# Patient Record
Sex: FEMALE | Race: WHITE | NOT HISPANIC OR LATINO | Employment: FULL TIME | URBAN - METROPOLITAN AREA
[De-identification: names, ages, dates, MRNs, and addresses within clinical notes are randomized per-mention and may not be internally consistent; named-entity substitution may affect disease eponyms.]

---

## 2022-01-31 PROBLEM — Z31.69 PRE-CONCEPTION COUNSELING: Status: ACTIVE | Noted: 2022-01-31

## 2022-02-01 PROBLEM — J45.909 ASTHMA: Status: ACTIVE | Noted: 2022-02-01

## 2022-03-25 PROBLEM — N89.8 VAGINAL DISCHARGE: Status: ACTIVE | Noted: 2022-03-25

## 2022-03-25 PROBLEM — L73.2 HIDRADENITIS SUPPURATIVA: Status: ACTIVE | Noted: 2022-03-25

## 2022-03-25 PROBLEM — B37.9 YEAST INFECTION: Status: ACTIVE | Noted: 2022-03-25

## 2022-05-12 PROBLEM — L73.2 HIDRADENITIS SUPPURATIVA: Status: RESOLVED | Noted: 2022-03-25 | Resolved: 2022-05-12

## 2022-10-14 ENCOUNTER — OFFICE VISIT (OUTPATIENT)
Dept: FAMILY MEDICINE CLINIC | Facility: CLINIC | Age: 32
End: 2022-10-14
Payer: COMMERCIAL

## 2022-10-14 VITALS
HEIGHT: 63 IN | OXYGEN SATURATION: 97 % | HEART RATE: 70 BPM | WEIGHT: 293 LBS | TEMPERATURE: 97.8 F | DIASTOLIC BLOOD PRESSURE: 84 MMHG | BODY MASS INDEX: 51.91 KG/M2 | SYSTOLIC BLOOD PRESSURE: 130 MMHG

## 2022-10-14 DIAGNOSIS — G89.29 CHRONIC MIDLINE LOW BACK PAIN WITHOUT SCIATICA: ICD-10-CM

## 2022-10-14 DIAGNOSIS — R10.84 GENERALIZED ABDOMINAL PAIN: Primary | ICD-10-CM

## 2022-10-14 DIAGNOSIS — M54.50 CHRONIC MIDLINE LOW BACK PAIN WITHOUT SCIATICA: ICD-10-CM

## 2022-10-14 DIAGNOSIS — K58.2 IRRITABLE BOWEL SYNDROME WITH BOTH CONSTIPATION AND DIARRHEA: ICD-10-CM

## 2022-10-14 PROCEDURE — 99214 OFFICE O/P EST MOD 30 MIN: CPT | Performed by: STUDENT IN AN ORGANIZED HEALTH CARE EDUCATION/TRAINING PROGRAM

## 2022-10-14 RX ORDER — OMEPRAZOLE 20 MG/1
20 CAPSULE, DELAYED RELEASE ORAL
Qty: 90 CAPSULE | Refills: 0 | Status: SHIPPED | OUTPATIENT
Start: 2022-10-14

## 2022-10-14 RX ORDER — METHYLPREDNISOLONE 4 MG/1
TABLET ORAL
Qty: 21 EACH | Refills: 0 | Status: SHIPPED | OUTPATIENT
Start: 2022-10-14

## 2022-10-14 NOTE — PROGRESS NOTES
Assessment/Plan:         Problem List Items Addressed This Visit    None     Visit Diagnoses     Generalized abdominal pain    -  Primary    Relevant Medications    omeprazole (PriLOSEC) 20 mg delayed release capsule    Irritable bowel syndrome with both constipation and diarrhea        Relevant Medications    omeprazole (PriLOSEC) 20 mg delayed release capsule    Chronic midline low back pain without sciatica        Relevant Medications    methylPREDNISolone 4 MG tablet therapy pack        Symptoms consistent with IBS  Recommend probiotic, food journal, and PPI sent in for reflux symptoms  Is thinking of pursuing a pediatric surgery but states she is likely going on her 's insurance to address this and will likely endoscopy for evaluation for this    Subjective:      Patient ID: Monserrat Mandel is a 27 y o  female  HPI    abd pain, bloating  Sometimes triggered by repeated acidic foods (spaghetti)  Coffee makes it worse  Was going on longer before being placed on metformin  Tried Nexium PRN and it does help but it returns when the nexium  She occasionally gets a mix of diarrhea or constipation with these symptoms and when she has a bowel movement symptoms do overall improved  The following portions of the patient's history were reviewed and updated as appropriate:   Past Medical History:  She has a past medical history of Asthma, Asthma (2/1/2022), Depression, Encounter for gynecological examination without abnormal finding (11/29/2021), Hidradenitis suppurativa (3/25/2022), Morbid obesity with body mass index (BMI) of 60 0 to 69 9 in [facility]), Multiple gastric ulcers, and Pre-conception counseling (1/31/2022)  ,  _______________________________________________________________________  Medical Problems:  does not have any pertinent problems on file ,  _______________________________________________________________________  Past Surgical History:   has a past surgical history that includes Ovary surgery; Dilation and curettage of uterus; and EGD AND COLONOSCOPY (2013)  ,  _______________________________________________________________________  Family History:  family history includes Diabetes in her maternal grandfather; No Known Problems in her brother, brother, sister, and sister  ,  _______________________________________________________________________  Social History:   reports that she has never smoked  She has never used smokeless tobacco  She reports previous alcohol use  She reports current drug use  Drug: Marijuana  ,  _______________________________________________________________________  Allergies:  is allergic to iodine - food allergy, mite (d  farinae), and red dye - food allergy     _______________________________________________________________________  Current Outpatient Medications   Medication Sig Dispense Refill   • albuterol (2 5 mg/3 mL) 0 083 % nebulizer solution Take 6 mL (5 mg total) by nebulization every 6 (six) hours as needed for wheezing (bronchospasm/initial rx ) 75 mL 0   • albuterol (PROVENTIL HFA,VENTOLIN HFA) 90 mcg/act inhaler Inhale 2 puffs every 4 (four) hours as needed     • Biotin w/ Vitamins C & E (HAIR SKIN & NAILS GUMMIES PO)      • metFORMIN (GLUCOPHAGE) 500 mg tablet TAKE 1 TABLET DAILY WITH BREAKFAST FOR 1-2 WEEKS, THEN INCREASE TO 1 TABLET TWICE DAILY (1000 MG TOTAL DAILY) FOR 2 WEEKS, THEN INCREASE TO 2 TABLETS TWICE DAILY (2000MG TOTAL DAILY) 270 tablet 1   • methylPREDNISolone 4 MG tablet therapy pack Use as directed on package 21 each 0   • Multiple Vitamin (multivitamin) tablet Take 1 tablet by mouth daily     • omeprazole (PriLOSEC) 20 mg delayed release capsule Take 1 capsule (20 mg total) by mouth daily before breakfast 90 capsule 0   • sertraline (ZOLOFT) 100 mg tablet Take 1 5 tablets (150 mg total) by mouth daily 135 tablet 1   • Insulin Pen Needle 32G X 4 MM MISC Use as directed with liraglutide (Patient not taking: Reported on 10/14/2022) 90 each 1   • liraglutide (SAXENDA) injection Inject under skin (abdomen, upper arms, or thighs) Week 1- Inject 0 6mg once daily; Week 2- inject 1 2mg once daily; Week 3 and on- inject 1 8mg once daily (Patient not taking: Reported on 10/14/2022) 3 mL 5     No current facility-administered medications for this visit      _______________________________________________________________________  Review of Systems   Constitutional: Negative for activity change, appetite change, fatigue and fever  HENT: Negative for congestion, rhinorrhea and sore throat  Eyes: Negative for visual disturbance  Respiratory: Negative for cough and shortness of breath  Cardiovascular: Negative for chest pain  Gastrointestinal: Positive for abdominal distention, abdominal pain, constipation, diarrhea and nausea  Negative for vomiting  Objective:  Vitals:    10/14/22 0957   BP: 130/84   BP Location: Left arm   Patient Position: Sitting   Cuff Size: Large   Pulse: 70   Temp: 97 8 °F (36 6 °C)   TempSrc: Tympanic   SpO2: 97%   Weight: (!) 140 kg (308 lb)   Height: 5' 3" (1 6 m)     Body mass index is 54 56 kg/m²  Physical Exam  Constitutional:       Appearance: Normal appearance  She is obese  She is not ill-appearing  HENT:      Head: Normocephalic and atraumatic  Pulmonary:      Effort: Pulmonary effort is normal  No respiratory distress  Abdominal:      General: Abdomen is flat  Bowel sounds are normal  There is no distension  Palpations: There is no mass  Tenderness: There is no abdominal tenderness  There is no right CVA tenderness, left CVA tenderness, guarding or rebound  Hernia: No hernia is present  Neurological:      General: No focal deficit present  Mental Status: She is alert and oriented to person, place, and time  Mental status is at baseline  Psychiatric:         Mood and Affect: Mood normal          Behavior: Behavior normal          Thought Content:  Thought content normal

## 2022-10-23 DIAGNOSIS — E11.9 NEW ONSET OF TYPE 2 DIABETES MELLITUS IN PEDIATRIC PATIENT (HCC): ICD-10-CM

## 2022-11-21 ENCOUNTER — VBI (OUTPATIENT)
Dept: ADMINISTRATIVE | Facility: OTHER | Age: 32
End: 2022-11-21

## 2022-12-20 ENCOUNTER — VBI (OUTPATIENT)
Dept: ADMINISTRATIVE | Facility: OTHER | Age: 32
End: 2022-12-20

## 2023-01-04 ENCOUNTER — PATIENT MESSAGE (OUTPATIENT)
Dept: FAMILY MEDICINE CLINIC | Facility: CLINIC | Age: 33
End: 2023-01-04

## 2023-01-05 ENCOUNTER — TELEPHONE (OUTPATIENT)
Dept: FAMILY MEDICINE CLINIC | Facility: CLINIC | Age: 33
End: 2023-01-05

## 2023-01-05 NOTE — TELEPHONE ENCOUNTER
----- Message from Alvarado Guaman MD sent at 1/4/2023  5:52 PM EST -----  Regarding: FW: ear throbbing  Contact: [phone]  Appointment please  ----- Message -----  From: Stefanie Rodriguez  Sent: 1/4/2023  12:53 PM EST  To: Alvarado Guaman MD  Subject: FW: ear throbbing                                Would you like to see her in the office?     ----- Message -----  From: Jodi End: 1/4/2023  11:22 AM EST  To: , #  Subject: ear throbbing                                    Hi!   I've been experiencing a weird throbbing, pulsating sound in my ears; especially when I exert myself and or bend down and get up quickly  I don't know if that is something to be concerned about  Or if it has anything to do with my diabetes  I have also noticed that my sugar drops during the day at work: yesterday after eating breakfast and lunch it was 87  So I had a snack, then I checked it before dinner and it was back down to 85  I don't know if they have anything to do with one another  I also- forgot to do the urine kit that came in the mail  So I am not sure if I will need to get bloodwork or pee in a cup! I just wanted to reach out before I made an appt to come in- to see if you felt it was necessary     Thank you! - Kiana

## 2023-01-05 NOTE — TELEPHONE ENCOUNTER
Attempted to reach pt to schedule this  10:35 AM    Voicemail is full, unable to leave a vm  My chart message sent  Will call later

## 2023-01-16 ENCOUNTER — TELEPHONE (OUTPATIENT)
Dept: FAMILY MEDICINE CLINIC | Facility: CLINIC | Age: 33
End: 2023-01-16

## 2023-01-16 ENCOUNTER — OFFICE VISIT (OUTPATIENT)
Dept: FAMILY MEDICINE CLINIC | Facility: CLINIC | Age: 33
End: 2023-01-16

## 2023-01-16 VITALS
TEMPERATURE: 98.1 F | WEIGHT: 293 LBS | OXYGEN SATURATION: 96 % | BODY MASS INDEX: 51.91 KG/M2 | HEART RATE: 86 BPM | SYSTOLIC BLOOD PRESSURE: 126 MMHG | DIASTOLIC BLOOD PRESSURE: 78 MMHG | HEIGHT: 63 IN

## 2023-01-16 DIAGNOSIS — H92.03 OTALGIA OF BOTH EARS: ICD-10-CM

## 2023-01-16 DIAGNOSIS — E28.2 PCOS (POLYCYSTIC OVARIAN SYNDROME): ICD-10-CM

## 2023-01-16 DIAGNOSIS — E11.9 TYPE 2 DIABETES MELLITUS WITHOUT COMPLICATION, WITHOUT LONG-TERM CURRENT USE OF INSULIN (HCC): Primary | ICD-10-CM

## 2023-01-16 DIAGNOSIS — F33.41 RECURRENT MAJOR DEPRESSIVE DISORDER, IN PARTIAL REMISSION (HCC): ICD-10-CM

## 2023-01-16 DIAGNOSIS — F41.9 ANXIETY: ICD-10-CM

## 2023-01-16 PROBLEM — N89.8 VAGINAL DISCHARGE: Status: RESOLVED | Noted: 2022-03-25 | Resolved: 2023-01-16

## 2023-01-16 PROBLEM — B37.9 YEAST INFECTION: Status: RESOLVED | Noted: 2022-03-25 | Resolved: 2023-01-16

## 2023-01-16 LAB — SL AMB POCT HEMOGLOBIN AIC: 5.9 (ref ?–6.5)

## 2023-01-16 RX ORDER — BUSPIRONE HYDROCHLORIDE 5 MG/1
5 TABLET ORAL 2 TIMES DAILY
Qty: 60 TABLET | Refills: 0 | Status: SHIPPED | OUTPATIENT
Start: 2023-01-16

## 2023-01-16 NOTE — ASSESSMENT & PLAN NOTE
Well-controlled on Zoloft for the depression aspect however has been having breakthrough panic attacks and I do believe this is causing some of her physical symptoms  Is starting counseling tomorrow which is excellent and will start BuSpar
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
## 2023-01-16 NOTE — TELEPHONE ENCOUNTER
[facility] Mounjaro 2 5mg      KEY:  [id number]  :  [id number]    Proceed with PA?

## 2023-01-18 NOTE — PROGRESS NOTES
Diagnoses and all orders for this visit:    Encounter for gynecological examination without abnormal finding    Yeast infection  -     fluconazole (DIFLUCAN) 150 mg tablet; Take one today and 1 in 3 days    Dermatitis  -     triamcinolone (KENALOG) 0 1 % ointment; Apply topically 2 (two) times a day    Other orders  -     Discontinue: clobetasol (TEMOVATE) 0 05 % ointment; Apply topically 2 (two) times a day    reviewed medications for yeast and possible dermatitis, pt advised follow up in 3 months for re evaluation of vulva     Perineal hygiene reviewed   Weight bearing exercises minium of 150 mins/weekly advised  Kegel exercises recommended  SBE encouraged, ASCCP guidelines reviewed  Condoms encouraged with all sexual activity to prevent STI's  Gardisil vaccines recommended up to age 39  Calcium/ Vit D dietary requirements discussed,   Advised to call with any issues,  all concerns & questions addressed  See provided information in your after visit summary     F/U Annually and PRN      Health Maintenance:    Last PAP: 01/19/2022 Neg/Neg  Next PAP Due: 2025    Last Mammogram: Not on file  advised age 34     Last Colonoscopy: Not on file    advised age 37    Gardisil:  Not completed / declines,  We have discussed the benefits in the past, she is not interested       Subjective    CC: Yearly Exam      Anat Rodríguez is a 26 y o  female here for an annual exam  Lelo Lopez  GYN hx includes: irregular menses  Only gets a cycle occasionally, LMP 12/13/2021  Undiagnosed PCOS, morbid obesity, hx of OCP's on and off for 10 years, regular cycles with OCP's   D& C 11/ 2017 for endometrial thickening, benign findings, Ovarian drilling 2018 to " help regulate menstrual cycle"   Does not desire to resume OCP's at this time as she does desire pregnancy in the future   No personal Hx of breast, cervical, ovarian or colon CA     Family hx of:  No GYN cancers  Reports recent diagnosis of diabetes, trying to lose weight, she has been able to reduce her HGBA1C from 12 to 6 9 follows with PMD    LMP in November    Her menstrual cycles are irregular  She denies issues with bleeding during her menses  Denies history of abnormal pap smear  She denies breast concerns, abnormal vaginal discharge, odor, irritation, bowel/bladder dysfunction, urinary symptoms, pelvic pain, or dyspareunia today  Reports vulvar itching at times  She is sexually active  Monogamous relationship  Her current method of contraception includes none  Denies any issues with her BCM  Naoma Mood She does not want STD testing today  Denies intimate partner violence     in august     Past Medical History:   Diagnosis Date   • Asthma    • Asthma 2/1/2022    Gets bronchitis with colds  Uses Albuterol for colds  • Depression     Well controlled with Zoloft   • Encounter for gynecological examination without abnormal finding 11/29/2021   • Hidradenitis suppurativa 3/25/2022   • Morbid obesity with body mass index (BMI) of 60 0 to 69 9 in [facility])    • Multiple gastric ulcers    • Pre-conception counseling 1/31/2022    Getting  August 2022, desires pregnancy Hx PCOS, ovarian drilling Weight reduction advised by Efren Mantle -Check A1C Taking multivitamin (confirm folate)  S/p COVID vax (no booster yet, due March 2022)     Past Surgical History:   Procedure Laterality Date   • DILATION AND CURETTAGE OF UTERUS      endometrial thickening   • EGD AND COLONOSCOPY  2013   • OVARY SURGERY      ovarian drilling          There is no immunization history on file for this patient      Family History   Problem Relation Age of Onset   • No Known Problems Sister    • No Known Problems Sister    • No Known Problems Brother    • No Known Problems Brother    • Diabetes Maternal Grandfather      Social History     Tobacco Use   • Smoking status: Never   • Smokeless tobacco: Never   Vaping Use   • Vaping Use: Former   Substance Use Topics   • Alcohol use: Not Currently   • Drug use: Yes     Types: Marijuana       Current Outpatient Medications:   •  albuterol (2 5 mg/3 mL) 0 083 % nebulizer solution, Take 6 mL (5 mg total) by nebulization every 6 (six) hours as needed for wheezing (bronchospasm/initial rx ), Disp: 75 mL, Rfl: 0  •  albuterol (PROVENTIL HFA,VENTOLIN HFA) 90 mcg/act inhaler, Inhale 2 puffs every 4 (four) hours as needed, Disp: , Rfl:   •  Biotin w/ Vitamins C & E (HAIR SKIN & NAILS GUMMIES PO), , Disp: , Rfl:   •  busPIRone (BUSPAR) 5 mg tablet, Take 1 tablet (5 mg total) by mouth 2 (two) times a day, Disp: 60 tablet, Rfl: 0  •  fluconazole (DIFLUCAN) 150 mg tablet, Take one today and 1 in 3 days, Disp: 2 tablet, Rfl: 0  •  metFORMIN (GLUCOPHAGE) 500 mg tablet, TAKE 1 TABLET DAILY WITH BREAKFAST FOR 1-2 WEEKS, THEN INCREASE TO 1 TABLET TWICE DAILY (1000 MG TOTAL DAILY) FOR 2 WEEKS, THEN INCREASE TO 2 TABLETS TWICE DAILY (2000MG TOTAL DAILY), Disp: 270 tablet, Rfl: 1  •  Multiple Vitamin (multivitamin) tablet, Take 1 tablet by mouth daily, Disp: , Rfl:   •  triamcinolone (KENALOG) 0 1 % ointment, Apply topically 2 (two) times a day, Disp: 30 g, Rfl: 0  •  sertraline (ZOLOFT) 100 mg tablet, Take 1 5 tablets (150 mg total) by mouth daily, Disp: 135 tablet, Rfl: 1  Patient Active Problem List    Diagnosis Date Noted   • PCOS (polycystic ovarian syndrome) 2023   • Recurrent major depressive disorder, in partial remission [facility] ) 2022   • Type 2 diabetes mellitus without complication, without long-term current use of insulin ([facility]) 2022       Allergies   Allergen Reactions   • Iodine - Food Allergy GI Intolerance   • Mite (D  Farinae) Eye Swelling and Itching   • Red Dye - Food Allergy Rash       OB History    Para Term  AB Living   0 0 0 0 0 0   SAB IAB Ectopic Multiple Live Births   0 0 0 0 0       Vitals:    23 0745   BP: 124/80   BP Location: Left arm   Patient Position: Sitting   Cuff Size: Large   Weight: (!) 142 kg (312 lb)   Height: 5' 3" (1 6 m)     Body mass index is 55 27 kg/m²  Review of Systems     Constitutional: Negative for chills, fatigue, fever, headaches, visual disturbances, and unexpected weight change  Respiratory: Negative for cough, & shortness of breath  Cardiovascular: Negative for chest pain       Gastrointestinal: Negative for Abd pain, nausea & vomiting, constipation and diarrhea  Genitourinary: Negative for difficulty urinating, dysuria, hematuria, dyspareunia, unusual vaginal bleeding or discharge  Skin: Negative skin changes    Physical Exam     Constitutional: Alert & Oriented x3, well-developed and well-nourished  No distress  HENT: Atraumatic, Normocephalic, Conjunctivae clear  Neck: Normal range of motion  Neck supple  No thyromegaly, mass, nodules or tenderness  Pulmonary: Effort normal    Abdominal: Soft  No tenderness or masses  Musculoskeletal: Normal ROM  Skin: Warm & Dry  Psychological: Normal mood, thought content, behavior & judgement     Breasts:   Right: tissue soft without masses, tenderness, skin changes or nipple discharge  No areas of erythema or pain  No subclavicular, axillary, pectoral adenopathy  Left:  tissue soft without masses, tenderness, skin changes or nipple discharge  No areas of erythema or pain  No subclavicular, axillary, pectoral adenopathy  Bilateral areas of scaring from hidradenitis suppurative      Pelvic exam was performed with patient supine, lithotomy position  Labia: Negative rash, tenderness, lesion or injury on the right labia  Negative rash, tenderness, lesion or injury on the left labia  Bilateral inner labia with skin change, slight hypopigmentation of tissue noted, possible yeast vs early Lichen's changes, area flat, not raised, not plaque like  Urethral meatus:  Negative for  tenderness, inflammation or discharge  Uterus: not deviated, enlarged, fixed or tender  Cervix: No CMT, no discharge or friability     Right adnexa: no mass, no tenderness and no fullness  Left adnexa: no mass, no tenderness and no fullness  Vagina: No erythema, tenderness, masses, or foreign body in the vagina  No signs of injury around the vagina  No unusual vaginal discharge   Perineum without lesions, signs of injury, erythema or swelling  Inguinal Canal:        Right: No inguinal adenopathy or hernia present  Left: No inguinal adenopathy or hernia present

## 2023-01-18 NOTE — PATIENT INSTRUCTIONS
Breast Self Exam for Women   AMBULATORY CARE:   A breast self-exam (BSE)  is a way to check your breasts for lumps and other changes  Regular BSEs can help you know how your breasts normally look and feel  Most breast lumps or changes are not cancer, but you should always have them checked by a healthcare provider  Why you should do a BSE:  Breast cancer is the most common type of cancer in women  Even if you have mammograms, you may still want to do a BSE regularly  If you know how your breasts normally feel and look, it may help you know when to contact your healthcare provider  Mammograms can miss some cancers  You may find a lump during a BSE that did not show up on a mammogram   When you should do a BSE:  If you have periods, you may want to do your BSE 1 week after your period ends  This is the time when your breasts may be the least swollen, lumpy, or tender  You can do regular BSEs even if you are breastfeeding or have breast implants  Call your doctor if:   You find any lumps or changes in your breasts  You have breast pain or fluid coming from your nipples  You have questions or concerns about your condition or care  How to do a BSE:       Look at your breasts in a mirror  Look at the size and shape of each breast and nipple  Check for swelling, lumps, dimpling, scaly skin, or other skin changes  Look for nipple changes, such as a nipple that is painful or beginning to pull inward  Gently squeeze both nipples and check to see if fluid (that is not breast milk) comes out of them  If you find any of these or other breast changes, contact your healthcare provider  Check your breasts while you sit or  the following 3 positions:    [place] your arms down at your sides  Raise your hands and join them behind your head  Put firm pressure with your hands on your hips  Bend slightly forward while you look at your breasts in the mirror  Lie down and feel your breasts    When you lie down, your breast tissue spreads out evenly over your chest  This makes it easier for you to feel for lumps and anything that may not be normal for your breasts  Do a BSE on one breast at a time  Place a small pillow or towel under your left shoulder  Put your left arm behind your head  Use the 3 middle fingers of your right hand  Use your fingertip pads, on the top of your fingers  Your fingertip pad is the most sensitive part of your finger  Use small circles to feel your breast tissue  Use your fingertip pads to make dime-sized, overlapping circles on your breast and armpits  Use light, medium, and firm pressure  First, press lightly  Second, press with medium pressure to feel a little deeper into the breast  Last, use firm pressure to feel deep within your breast     Examine your entire breast area  Examine the breast area from above the breast to below the breast where you feel only ribs  Make small circles with your fingertips, starting in the middle of your armpit  Make circles going up and down the breast area  Continue toward your breast and all the way across it  Examine the area from your armpit all the way over to the middle of your chest (breastbone)  Stop at the middle of your chest     Move the pillow or towel to your right shoulder, and put your right arm behind your head  Use the 3 fingertip pads of your left hand, and repeat the above steps to do a BSE on your right breast     What else you can do to check for breast problems or cancer:  Talk to your healthcare provider about mammograms  A mammogram is an x-ray of your breasts to screen for breast cancer or other problems  Your provider can tell you the benefits and risks of mammograms  The first mammogram is usually at age 39 or 48  Your provider may recommend you start at 36 or younger if your risk for breast cancer is high  Mammograms usually continue every 1 to 2 years until age 76         Follow up with your doctor as directed:  Write down your questions so you remember to ask them during your visits  © Copyright [employer] 2022 Information is for End User's use only and may not be sold, redistributed or otherwise used for commercial purposes  All illustrations and images included in CareNotes® are the copyrighted property of A D A M , Inc  or Lizzie Pedro  The above information is an  only  It is not intended as medical advice for individual conditions or treatments  Talk to your doctor, nurse or pharmacist before following any medical regimen to see if it is safe and effective for you  Wellness Visit for Adults   AMBULATORY CARE:   A wellness visit  is when you see your healthcare provider to get screened for health problems  Your healthcare provider will also give you advice on how to stay healthy  Write down your questions so you remember to ask them  Ask your healthcare provider how often you should have a wellness visit  What happens at a wellness visit:  Your healthcare provider will ask about your health, and your family history of health problems  This includes high blood pressure, heart disease, and cancer  He or she will ask if you have symptoms that concern you, if you smoke, and about your mood  You may also be asked about your intake of medicines, supplements, food, and alcohol  Any of the following may be done: Your weight  will be checked  Your height may also be checked so your body mass index (BMI) can be calculated  Your BMI shows if you are at a healthy weight  Your blood pressure  and heart rate will be checked  Your temperature may also be checked  Blood and urine tests  may be done  Blood tests may be done to check your cholesterol levels  Abnormal cholesterol levels increase your risk for heart disease and stroke  You may also need a blood or urine test to check for diabetes if you are at increased risk  Urine tests may be done to look for signs of an infection or kidney disease      A physical exam  includes checking your heartbeat and lungs with a stethoscope  Your healthcare provider may also check your skin to look for sun damage  Screening tests  may be recommended  A screening test is done to check for diseases that may not cause symptoms  The screening tests you may need depend on your age, gender, family history, and lifestyle habits  For example, colorectal screening may be recommended if you are 48years old or older  Screening tests you need if you are a woman:   A Pap smear  is used to screen for cervical cancer  Pap smears are usually done every 3 to 5 years depending on your age  You may need them more often if you have had abnormal Pap smear test results in the past  Ask your healthcare provider how often you should have a Pap smear  A mammogram  is an x-ray of your breasts to screen for breast cancer  Experts recommend mammograms every 2 years starting at age 48 years  You may need a mammogram at age 52 years or younger if you have an increased risk for breast cancer  Talk to your healthcare provider about when you should start having mammograms and how often you need them  Vaccines you may need:   Get an influenza vaccine  every year  The influenza vaccine protects you from the flu  Several types of viruses cause the flu  The viruses change over time, so new vaccines are made each year  Get a tetanus-diphtheria (Td) booster vaccine  every 10 years  This vaccine protects you against tetanus and diphtheria  Tetanus is a severe infection that may cause painful muscle spasms and lockjaw  Diphtheria is a severe bacterial infection that causes a thick covering in the back of your mouth and throat  Get a human papillomavirus (HPV) vaccine  if you are female and aged 23 to 32 or male 23 to 24 and never received it  This vaccine protects you from HPV infection  HPV is the most common infection spread by sexual contact   HPV may also cause vaginal, penile, and anal cancers  Get a pneumococcal vaccine  if you are aged 72 years or older  The pneumococcal vaccine is an injection given to protect you from pneumococcal disease  Pneumococcal disease is an infection caused by pneumococcal bacteria  The infection may cause pneumonia, meningitis, or an ear infection  Get a shingles vaccine  if you are 60 or older, even if you have had shingles before  The shingles vaccine is an injection to protect you from the varicella-zoster virus  This is the same virus that causes chickenpox  Shingles is a painful rash that develops in people who had chickenpox or have been exposed to the virus  How to eat healthy:  My Plate is a model for planning healthy meals  It shows the types and amounts of foods that should go on your plate  Fruits and vegetables make up about half of your plate, and grains and protein make up the other half  A serving of dairy is included on the side of your plate  The amount of calories and serving sizes you need depends on your age, gender, weight, and height  Examples of healthy foods are listed below:  Eat a variety of vegetables  such as dark green, red, and orange vegetables  You can also include canned vegetables low in sodium (salt) and frozen vegetables without added butter or sauces  Eat a variety of fresh fruits , canned fruit in 100% juice, frozen fruit, and dried fruit  Include whole grains  At least half of the grains you eat should be whole grains  Examples include whole-wheat bread, wheat pasta, brown rice, and whole-grain cereals such as oatmeal     Eat a variety of protein foods such as seafood (fish and shellfish), lean meat, and poultry without skin (turkey and chicken)  Examples of lean meats include pork leg, shoulder, or tenderloin, and beef round, sirloin, tenderloin, and extra lean ground beef  Other protein foods include eggs and egg substitutes, beans, peas, soy products, nuts, and seeds      Choose low-fat dairy products such as skim or 1% milk or low-fat yogurt, cheese, and cottage cheese  Limit unhealthy fats  such as butter, hard margarine, and shortening  Exercise:  Exercise at least 30 minutes per day on most days of the week  Some examples of exercise include walking, biking, dancing, and swimming  You can also fit in more physical activity by taking the stairs instead of the elevator or parking farther away from stores  Include muscle strengthening activities 2 days each week  Regular exercise provides many health benefits  It helps you manage your weight, and decreases your risk for type 2 diabetes, heart disease, stroke, and high blood pressure  Exercise can also help improve your mood  Ask your healthcare provider about the best exercise plan for you  General health and safety guidelines:   Do not smoke  Nicotine and other chemicals in cigarettes and cigars can cause lung damage  Ask your healthcare provider for information if you currently smoke and need help to quit  E-cigarettes or smokeless tobacco still contain nicotine  Talk to your healthcare provider before you use these products  Limit alcohol  A drink of alcohol is 12 ounces of beer, 5 ounces of wine, or 1½ ounces of liquor  Lose weight, if needed  Being overweight increases your risk of certain health conditions  These include heart disease, high blood pressure, type 2 diabetes, and certain types of cancer  Protect your skin  Do not sunbathe or use tanning beds  Use sunscreen with a SPF 15 or higher  Apply sunscreen at least 15 minutes before you go outside  Reapply sunscreen every 2 hours  Wear protective clothing, hats, and sunglasses when you are outside  Drive safely  Always wear your seatbelt  Make sure everyone in your car wears a seatbelt  A seatbelt can save your life if you are in an accident  Do not use your cell phone when you are driving  This could distract you and cause an accident   Pull over if you need to make a call or send a text message  Practice safe sex  Use latex condoms if are sexually active and have more than one partner  Your healthcare provider may recommend screening tests for sexually transmitted infections (STIs)  Wear helmets, lifejackets, and protective gear  Always wear a helmet when you ride a bike or motorcycle, go skiing, or play sports that could cause a head injury  Wear protective equipment when you play sports  Wear a lifejacket when you are on a boat or doing water sports  © Copyright [employer] 2022 Information is for End User's use only and may not be sold, redistributed or otherwise used for commercial purposes  All illustrations and images included in CareNotes® are the copyrighted property of A D A M , Inc  or Lizzie Franklin   The above information is an  only  It is not intended as medical advice for individual conditions or treatments  Talk to your doctor, nurse or pharmacist before following any medical regimen to see if it is safe and effective for you  HPV (Human Papillomavirus) Vaccine for Adults   AMBULATORY CARE:   The human papillomavirus (HPV) vaccine  is an injection given to females and males to protect against human papillomavirus infection  HPV is most commonly spread through sexual activity  It can also be spread from a mother to her baby during delivery  The HPV vaccine is most effective if given before sexual activity begins  This allows your body to build almost complete protection against HPV before you have contact with the virus  The HPV vaccine is still effective after sexual activity has begun  How the vaccine is given:  The HPV vaccine can be given with other vaccines  The vaccine is given in 2 or 3 doses through age 32: The first dose  is given at any time  The second dose  is given 1 to 2 months after the first dose  The third dose, if needed,  is given 6 months after the first dose      Reasons you should not get the HPV vaccine, or should wait to get it: You had a severe allergic reaction to a dose of the vaccine  You are pregnant  Your healthcare provider will tell you when you can get the vaccine  You are sick or have a fever  You may need to wait to get the vaccine until symptoms go away  Risks of the HPV vaccine: You may have pain, redness, or swelling where the shot was given  You may have a fever or headache  You may have an allergic reaction to the vaccine  This can be life-threatening  Call your local emergency number (911 in the [address]) if:   You have signs of a severe allergic reaction, such as trouble breathing, hives, or wheezing  Seek care immediately if:   You have a high fever or behavior changes that concern you  Call your doctor if:   You have questions or concerns about the HPV vaccine  Apply a warm compress  to the area to relieve swelling and pain  Follow up with your doctor as directed:  Write down your questions so you remember to ask them during your visits  © Copyright [employer] 2022 Information is for End User's use only and may not be sold, redistributed or otherwise used for commercial purposes  All illustrations and images included in CareNotes® are the copyrighted property of A D A M , Inc  or [facility] [employer]   The above information is an  only  It is not intended as medical advice for individual conditions or treatments  Talk to your doctor, nurse or pharmacist before following any medical regimen to see if it is safe and effective for you  Kegel Exercises for Women   AMBULATORY CARE:   Kegel exercises  help strengthen your pelvic muscles  Pelvic muscles hold your pelvic organs, such as your bladder and uterus, in place  Kegel exercises help prevent or control problems with urine incontinence (leakage)  Incontinence may be caused by pregnancy, childbirth, or menopause  Contact your healthcare provider if:   You cannot feel your muscles tighten or relax      You continue to leak urine  You have questions or concerns about your condition or care  Use the correct muscles:  Pelvic muscles are the muscles you use to control urine flow  To target these muscles, stop and start the flow of urine several times  This will help you become familiar with how it feels to tighten and relax these muscles  How to do Kegel exercises:   Empty your bladder  You may lie down, stand up, or sit down to do these exercises  When you first try to do these exercises, it may be easier if you lie down  Tighten or squeeze your pelvic muscles slowly  It may feel like you are trying to hold back urine or gas  Hold this position for 3 seconds  Relax for 3 seconds  Repeat this cycle 10 times  Do 10 sets of Kegel exercises, at least 3 times a day  Do not hold your breath when you do Kegel exercises  Keep your stomach, back, and leg muscles relaxed  As your muscles get stronger, you will be able to hold the squeeze longer  Your healthcare provider may ask that you increase your pelvic muscle squeeze to 10 seconds  After you squeeze for 10 seconds, relax for 10 seconds  What else you should know:   Once you know how to do Kegel exercises, use different positions  You can do these exercises while you lie on the floor, sit at your desk or watch TV, and while you stand  You may notice improved bladder control within about 6 weeks  Tighten your pelvic muscles before you sneeze, cough, or lift to prevent urine leakage  Follow up with your doctor as directed:  Write down your questions so you remember to ask them during your visits  © Copyright [employer] 2022 Information is for End User's use only and may not be sold, redistributed or otherwise used for commercial purposes  All illustrations and images included in CareNotes® are the copyrighted property of A D A M , Inc  or [facility] Jimi Franklin   The above information is an  only   It is not intended as medical advice for individual conditions or treatments  Talk to your doctor, nurse or pharmacist before following any medical regimen to see if it is safe and effective for you  Perineal Hygiene      Your vaginal naturally takes care of its self, it is a self washing system, the less you mess the healthier it will be     No soaps or feminine wash to the vulva, these products can cause dermitis, bacterial infections and other vulvar problems  Use only water to cleanse, or water with [employer] or [employer] if necessary  No scented lotions or products are advised in or near your vulva  Use only coconut oil for moisture if needed  No douching this may cause imbalance in your vaginal PH and further issues  If you wear panty liners, you may apply a thin coating of Vaseline, A&D ointment or coconut oil to the vulvar tissues as a skin barrier     [place] underware, loose fitting clothing  Only perfume-free, dye-free laundry detergent, use a second rinse cycle   Avoid fabric softeners/dryer sheets  Partner should avoid the same products as well  Over the counter probiotic to restore vaginal sommer may be helpful as well, take daily  You may also look into Boric Acid vaginal suppositories to restore vaginal PH balance for up to 2 weeks as directed on the box  You may not use these if you are pregnant      For vaginal dryness: You may use:     Coconut oil (organic, pure, unscented) as needed for moisture or lubrication  ( Do not use if allergic)       Replens moisture restore external comfort gel daily ( use as directed on the box)        Replens long lasting vaginal moisturizer  ( use as directed on the box)         For Vaginal Lubrication:          You may use:     Coconut oil (organic, pure, unscented) as a lubricant or another scent-free lubricant (Astroglide, Uberlube) if needed    Do not use coconut oil or silicone if using a condom as this may break down the integrity of the condom and cause an unplanned pregnancy              Do not use coconut oil if allergic               Replens silky smooth lubricant, premium silicone based lubricant for intercourse  ( use as directed, a small amount will provide an enhanced natural feeling)     Any premium over the counter vaginal lubricant water or silicone based  Silicone based will have more staying power  Lichen Sclerosus   AMBULATORY CARE:   Lichen sclerosus  is a skin condition that most often affects the vulva, penis, or skin around the anus  Lichen sclerosus occurs most often in females  The cause of lichen sclerosus may not be known  Common symptoms include the following: You may not have any symptoms, or you may have any of the following:  Pain, itching, or burning    Areas of skin that are thin, wrinkled, and white    Blisters    Bleeding, bruises, or tears on affected skin    Contact your healthcare provider if:   Your symptoms do not get better with treatment  You have questions or concerns about your condition or care  Treatment and management:  You do not need treatment if you do not have any symptoms  Your healthcare provider may recommend a steroid cream or ointment  Your provider may also recommend a topical medicine that prevents your immune system from attacking your skin  If you are female, your healthcare provider may recommend that you do not take bubble baths, or use scented soaps and scented detergents  This may help decrease irritation of the vulva  Rarely, adults may need surgery to repair scarring that can occur over time  Follow up with your doctor as directed:  Write down your questions so you remember to ask them during your visits  © Copyright [employer] 2022 Information is for End User's use only and may not be sold, redistributed or otherwise used for commercial purposes   All illustrations and images included in CareNotes® are the copyrighted property of A D A M , Inc  or Lizzie Pedro  The above information is an  only  It is not intended as medical advice for individual conditions or treatments  Talk to your doctor, nurse or pharmacist before following any medical regimen to see if it is safe and effective for you

## 2023-01-19 NOTE — PROGRESS NOTES
LMP: Last menses was in 2022 History of PCOS  PMB:  SA:  YES   HPV: NO   Birth control:  NO   Last pap: 2022  Negative  HPV Negative   Last mammo: Not on file:  Last colonoscopy: Not on file  Last Dexa: Not on file  Family History:  None     Patient has no complaints at today's visit

## 2023-01-20 ENCOUNTER — ANNUAL EXAM (OUTPATIENT)
Dept: OBGYN CLINIC | Facility: CLINIC | Age: 33
End: 2023-01-20

## 2023-01-20 VITALS
WEIGHT: 293 LBS | SYSTOLIC BLOOD PRESSURE: 124 MMHG | DIASTOLIC BLOOD PRESSURE: 80 MMHG | BODY MASS INDEX: 51.91 KG/M2 | HEIGHT: 63 IN

## 2023-01-20 DIAGNOSIS — B37.9 YEAST INFECTION: ICD-10-CM

## 2023-01-20 DIAGNOSIS — Z01.419 ENCOUNTER FOR GYNECOLOGICAL EXAMINATION WITHOUT ABNORMAL FINDING: Primary | ICD-10-CM

## 2023-01-20 DIAGNOSIS — L30.9 DERMATITIS: ICD-10-CM

## 2023-01-20 RX ORDER — FLUCONAZOLE 150 MG/1
TABLET ORAL
Qty: 2 TABLET | Refills: 0 | Status: SHIPPED | OUTPATIENT
Start: 2023-01-20 | End: 2023-01-23

## 2023-01-20 RX ORDER — CLOBETASOL PROPIONATE 0.5 MG/G
OINTMENT TOPICAL 2 TIMES DAILY
Qty: 30 G | Refills: 0 | Status: SHIPPED | OUTPATIENT
Start: 2023-01-20 | End: 2023-01-20 | Stop reason: ALTCHOICE

## 2023-02-14 DIAGNOSIS — F41.9 ANXIETY: ICD-10-CM

## 2023-02-14 RX ORDER — BUSPIRONE HYDROCHLORIDE 5 MG/1
5 TABLET ORAL 2 TIMES DAILY
Qty: 60 TABLET | Refills: 0 | Status: SHIPPED | OUTPATIENT
Start: 2023-02-14

## 2023-02-20 ENCOUNTER — PATIENT MESSAGE (OUTPATIENT)
Dept: FAMILY MEDICINE CLINIC | Facility: CLINIC | Age: 33
End: 2023-02-20

## 2023-02-20 DIAGNOSIS — R19.7 DIARRHEA, UNSPECIFIED TYPE: ICD-10-CM

## 2023-02-20 DIAGNOSIS — R11.2 NAUSEA AND VOMITING, UNSPECIFIED VOMITING TYPE: Primary | ICD-10-CM

## 2023-02-20 NOTE — TELEPHONE ENCOUNTER
From: Jill Akbar  To: Amarjit Hernandez  Sent: 2/20/2023 2:43 PM EST  Subject: Stomach issues    Hi,     I know we’ve mentioned previously about my stomach issues  I usually think it’s an ulcer and I deal with it until it goes away  This time it, I couldn’t even work  I had severe diarrhea, I was very gassy, and throwing up (nothing because I couldn’t eat)  I know Alton Wilson mentioned to you about gastro paresis and I was wondering what type of testing would we have to do to rule it or anything else out? OR can IBS cause this? It’s a feeling of full in the stomach like I can’t eat any more  Then it’s a weird indigestion with a lot of belching that is nauseating  And finally throwing up- or having severe diarrhea  I apologize for not making an appointment  I have a lot going on at work right now and I don’t want to take more time off of work  But will if you find it necessary  Thanks for the help- if you are able-    Kiana

## 2023-03-02 ENCOUNTER — CONSULT (OUTPATIENT)
Dept: GASTROENTEROLOGY | Facility: CLINIC | Age: 33
End: 2023-03-02

## 2023-03-02 VITALS
WEIGHT: 293 LBS | HEART RATE: 78 BPM | BODY MASS INDEX: 51.91 KG/M2 | OXYGEN SATURATION: 97 % | DIASTOLIC BLOOD PRESSURE: 82 MMHG | HEIGHT: 63 IN | SYSTOLIC BLOOD PRESSURE: 124 MMHG

## 2023-03-02 DIAGNOSIS — R14.0 BLOATING: ICD-10-CM

## 2023-03-02 DIAGNOSIS — R10.13 EPIGASTRIC PAIN: Primary | ICD-10-CM

## 2023-03-02 DIAGNOSIS — R19.7 DIARRHEA, UNSPECIFIED TYPE: ICD-10-CM

## 2023-03-02 DIAGNOSIS — R10.11 RUQ PAIN: ICD-10-CM

## 2023-03-02 DIAGNOSIS — R11.2 NAUSEA AND VOMITING, UNSPECIFIED VOMITING TYPE: ICD-10-CM

## 2023-03-02 DIAGNOSIS — K21.9 GASTROESOPHAGEAL REFLUX DISEASE WITHOUT ESOPHAGITIS: ICD-10-CM

## 2023-03-02 RX ORDER — PANTOPRAZOLE SODIUM 40 MG/1
40 TABLET, DELAYED RELEASE ORAL DAILY
Qty: 30 TABLET | Refills: 3 | Status: SHIPPED | OUTPATIENT
Start: 2023-03-02

## 2023-03-02 NOTE — LETTER
March 2, 2023     Alpesh Sparks MD  [facility]    Patient: Mercedes Freire   YOB: 1990   Date of Visit: 3/2/2023       Dear Dr Graciela Batista: Thank you for referring Mercedes Freire to me for evaluation  Below are my notes for this consultation  If you have questions, please do not hesitate to call me  I look forward to following your patient along with you  Sincerely,        Nickie Jarquin PA-C        CC: No Recipients  Nickie Jarquin PA-C  3/2/2023  9:39 AM  Sign when Signing Visit  [facility] - Outpatient Consultation  Mercedes Freire 26 y o  female MRN: [id number]  Encounter: [id number]          ASSESSMENT AND PLAN:      1  Nausea and vomiting, unspecified vomiting type  2  Diarrhea, unspecified type  3  Epigastric pain  4  RUQ pain  5  Gastroesophageal reflux disease without esophagitis  6  Bloating  -Will plan EGD and colonoscopy with biopsies   -Patient will continue probiotic daily   -High-fiber diet given  Patient will start fiber supplementation daily   -Prescription for pantoprazole 40 mg daily sent to the pharmacy  -Right upper quadrant ultrasound to evaluate for gallbladder pathology ordered     -Patient will follow-up after all above testing is complete   ______________________________________________________________________    HPI:    15-year-old female with a past medical history significant for PCOS and diabetes mellitus presents to the GI clinic today for consultation  Patient reports that for at least 20 years she has suffered with stomach issues  She reports that 10 years ago she was diagnosed with peptic ulcer disease  She reports that over the last several weeks to months she has had worsening GI symptoms  She reports ongoing issues of abdominal gas and bloating  She reports burping as well as a sour taste in her mouth    She reports that sometimes the severity of her burping will cause her to vomit   She is also reporting episodes of severe diarrhea  She denies any rectal bleeding or melena  She reports epigastric abdominal pain and will radiate to the right upper quadrant  She reports that this pain can become severe but it is sporadic  She is on a probiotic daily  She reports that her dietary triggers are fast food, greasy food, and tomato sauce  She most recently started over-the-counter Prilosec 4 to 5 days ago  She does report this is helping  She does report a family history of irritable bowel syndrome as well as diverticulitis in her father and a brother with gastroparesis  REVIEW OF SYSTEMS:    CONSTITUTIONAL: Denies any fever, chills, rigors, and weight loss  HEENT: No earache or tinnitus  Denies hearing loss or visual disturbances  CARDIOVASCULAR: No chest pain or palpitations  RESPIRATORY: Denies any cough, hemoptysis, shortness of breath or dyspnea on exertion  GASTROINTESTINAL: As noted in the History of Present Illness  GENITOURINARY: No problems with urination  Denies any hematuria or dysuria  NEUROLOGIC: No dizziness or vertigo, denies headaches  MUSCULOSKELETAL: Denies any muscle or joint pain  SKIN: Denies skin rashes or itching  ENDOCRINE: Denies excessive thirst  Denies intolerance to heat or cold  PSYCHOSOCIAL: Denies depression or anxiety  Denies any recent memory loss  Historical Information   Past Medical History:   Diagnosis Date   • Asthma    • Asthma 02/01/2022    Gets bronchitis with colds  Uses Albuterol for colds      • Depression     Well controlled with Zoloft   • Encounter for gynecological examination without abnormal finding 11/29/2021   • Hidradenitis suppurativa 03/25/2022   • Irritable bowel syndrome    • Morbid obesity with body mass index (BMI) of 60 0 to 69 9 in adult [facility])    • Multiple gastric ulcers    • Pre-conception counseling 01/31/2022    Getting  August 2022, desires pregnancy Hx PCOS, ovarian drilling Weight reduction advised by Arina Etienne -Check A1C Taking multivitamin (confirm folate)  S/p COVID vax (no booster yet, due March 2022)     Past Surgical History:   Procedure Laterality Date   • DILATION AND CURETTAGE OF UTERUS      endometrial thickening   • EGD AND COLONOSCOPY  2013   • OVARY SURGERY      ovarian drilling      Social History   Social History     Substance and Sexual Activity   Alcohol Use Not Currently     Social History     Substance and Sexual Activity   Drug Use Yes   • Types: Marijuana     Social History     Tobacco Use   Smoking Status Never   Smokeless Tobacco Never     Family History   Problem Relation Age of Onset   • No Known Problems Sister    • No Known Problems Sister    • No Known Problems Brother    • No Known Problems Brother    • Diabetes Maternal Grandfather        Meds/Allergies        Current Outpatient Medications:   •  albuterol (2 5 mg/3 mL) 0 083 % nebulizer solution  •  albuterol (PROVENTIL HFA,VENTOLIN HFA) 90 mcg/act inhaler  •  Biotin w/ Vitamins C & E (HAIR SKIN & NAILS GUMMIES PO)  •  busPIRone (BUSPAR) 5 mg tablet  •  metFORMIN (GLUCOPHAGE) 500 mg tablet  •  Multiple Vitamin (multivitamin) tablet  •  pantoprazole (PROTONIX) 40 mg tablet  •  sertraline (ZOLOFT) 100 mg tablet  •  triamcinolone (KENALOG) 0 1 % ointment    Allergies   Allergen Reactions   • Iodine - Food Allergy GI Intolerance   • Mite (D  Farinae) Eye Swelling and Itching   • Red Dye - Food Allergy Rash           Objective      Blood pressure 124/82, pulse 78, height 5' 3" (1 6 m), weight (!) 141 kg (311 lb), SpO2 97 %  Body mass index is 55 09 kg/m²  PHYSICAL EXAM:      General Appearance:   Alert, cooperative, no distress   HEENT:   Normocephalic, atraumatic, anicteric      Neck:  Supple, symmetrical, trachea midline   Lungs:   Clear to auscultation bilaterally; no rales, rhonchi or wheezing; respirations unlabored    Heart[de-identified]   Regular rate and rhythm; no murmur, rub, or gallop     Abdomen:   Soft, non-tender, non-distended; normal bowel sounds; no masses, no organomegaly    Genitalia:   Deferred    Rectal:   Deferred    Extremities:  No cyanosis, clubbing or edema    Pulses:  2+ and symmetric    Skin:  No jaundice, rashes, or lesions    Lymph nodes:  No palpable cervical lymphadenopathy        Lab Results:   No visits with results within 1 Day(s) from this visit  Latest known visit with results is:   Office Visit on 01/16/2023   Component Date Value   • Hemoglobin A1C 01/16/2023 5 9          Radiology Results:   No results found

## 2023-03-02 NOTE — PATIENT INSTRUCTIONS
Scheduled date of EGD/colonoscopy (as of today):5/18/23  Physician performing EGD/colonoscopy:Dannie  Location of EGD/colonoscopy:[facility]  Desired bowel prep reviewed with patient:Chauncey/Miralax  Instructions reviewed with patient by:Alden alberto  Clearances:   none

## 2023-03-02 NOTE — PROGRESS NOTES
[facility]  Charan Brown 26 y o  female MRN: [id number]  Encounter: [id number]          ASSESSMENT AND PLAN:      1  Nausea and vomiting, unspecified vomiting type  2  Diarrhea, unspecified type  3  Epigastric pain  4  RUQ pain  5  Gastroesophageal reflux disease without esophagitis  6  Bloating  -Will plan EGD and colonoscopy with biopsies   -Patient will continue probiotic daily   -High-fiber diet given  Patient will start fiber supplementation daily   -Prescription for pantoprazole 40 mg daily sent to the pharmacy  -Right upper quadrant ultrasound to evaluate for gallbladder pathology ordered     -Patient will follow-up after all above testing is complete   ______________________________________________________________________    HPI:    25-year-old female with a past medical history significant for PCOS and diabetes mellitus presents to the GI clinic today for consultation  Patient reports that for at least 20 years she has suffered with stomach issues  She reports that 10 years ago she was diagnosed with peptic ulcer disease  She reports that over the last several weeks to months she has had worsening GI symptoms  She reports ongoing issues of abdominal gas and bloating  She reports burping as well as a sour taste in her mouth  She reports that sometimes the severity of her burping will cause her to vomit  She is also reporting episodes of severe diarrhea  She denies any rectal bleeding or melena  She reports epigastric abdominal pain and will radiate to the right upper quadrant  She reports that this pain can become severe but it is sporadic  She is on a probiotic daily  She reports that her dietary triggers are fast food, greasy food, and tomato sauce  She most recently started over-the-counter Prilosec 4 to 5 days ago  She does report this is helping    She does report a family history of irritable bowel syndrome as well as diverticulitis in her father and a brother with gastroparesis  REVIEW OF SYSTEMS:    CONSTITUTIONAL: Denies any fever, chills, rigors, and weight loss  HEENT: No earache or tinnitus  Denies hearing loss or visual disturbances  CARDIOVASCULAR: No chest pain or palpitations  RESPIRATORY: Denies any cough, hemoptysis, shortness of breath or dyspnea on exertion  GASTROINTESTINAL: As noted in the History of Present Illness  GENITOURINARY: No problems with urination  Denies any hematuria or dysuria  NEUROLOGIC: No dizziness or vertigo, denies headaches  MUSCULOSKELETAL: Denies any muscle or joint pain  SKIN: Denies skin rashes or itching  ENDOCRINE: Denies excessive thirst  Denies intolerance to heat or cold  PSYCHOSOCIAL: Denies depression or anxiety  Denies any recent memory loss  Historical Information   Past Medical History:   Diagnosis Date   • Asthma    • Asthma 02/01/2022    Gets bronchitis with colds  Uses Albuterol for colds  • Depression     Well controlled with Zoloft   • Encounter for gynecological examination without abnormal finding 11/29/2021   • Hidradenitis suppurativa 03/25/2022   • Irritable bowel syndrome    • Morbid obesity with body mass index (BMI) of 60 0 to 69 9 in adult [facility])    • Multiple gastric ulcers    • Pre-conception counseling 01/31/2022    Getting  August 2022, desires pregnancy Hx PCOS, ovarian drilling Weight reduction advised by Cesario Aldrich -Check A1C Taking multivitamin (confirm folate)   S/p COVID vax (no booster yet, due March 2022)     Past Surgical History:   Procedure Laterality Date   • DILATION AND CURETTAGE OF UTERUS      endometrial thickening   • EGD AND COLONOSCOPY  2013   • OVARY SURGERY      ovarian drilling      Social History   Social History     Substance and Sexual Activity   Alcohol Use Not Currently     Social History     Substance and Sexual Activity   Drug Use Yes   • Types: Marijuana     Social History     Tobacco Use   Smoking Status Never   Smokeless Tobacco Never     Family History   Problem Relation Age of Onset   • No Known Problems Sister    • No Known Problems Sister    • No Known Problems Brother    • No Known Problems Brother    • Diabetes Maternal Grandfather        Meds/Allergies       Current Outpatient Medications:   •  albuterol (2 5 mg/3 mL) 0 083 % nebulizer solution  •  albuterol (PROVENTIL HFA,VENTOLIN HFA) 90 mcg/act inhaler  •  Biotin w/ Vitamins C & E (HAIR SKIN & NAILS GUMMIES PO)  •  busPIRone (BUSPAR) 5 mg tablet  •  metFORMIN (GLUCOPHAGE) 500 mg tablet  •  Multiple Vitamin (multivitamin) tablet  •  pantoprazole (PROTONIX) 40 mg tablet  •  sertraline (ZOLOFT) 100 mg tablet  •  triamcinolone (KENALOG) 0 1 % ointment    Allergies   Allergen Reactions   • Iodine - Food Allergy GI Intolerance   • Mite (D  Farinae) Eye Swelling and Itching   • Red Dye - Food Allergy Rash           Objective     Blood pressure 124/82, pulse 78, height 5' 3" (1 6 m), weight (!) 141 kg (311 lb), SpO2 97 %  Body mass index is 55 09 kg/m²  PHYSICAL EXAM:      General Appearance:   Alert, cooperative, no distress   HEENT:   Normocephalic, atraumatic, anicteric      Neck:  Supple, symmetrical, trachea midline   Lungs:   Clear to auscultation bilaterally; no rales, rhonchi or wheezing; respirations unlabored    Heart[de-identified]   Regular rate and rhythm; no murmur, rub, or gallop  Abdomen:   Soft, non-tender, non-distended; normal bowel sounds; no masses, no organomegaly    Genitalia:   Deferred    Rectal:   Deferred    Extremities:  No cyanosis, clubbing or edema    Pulses:  2+ and symmetric    Skin:  No jaundice, rashes, or lesions    Lymph nodes:  No palpable cervical lymphadenopathy        Lab Results:   No visits with results within 1 Day(s) from this visit  Latest known visit with results is:   Office Visit on 01/16/2023   Component Date Value   • Hemoglobin A1C 01/16/2023 5 9          Radiology Results:   No results found

## 2023-03-08 DIAGNOSIS — F33.41 RECURRENT MAJOR DEPRESSIVE DISORDER, IN PARTIAL REMISSION (HCC): ICD-10-CM

## 2023-03-09 DIAGNOSIS — E11.9 NEW ONSET OF TYPE 2 DIABETES MELLITUS IN PEDIATRIC PATIENT (HCC): ICD-10-CM

## 2023-03-09 RX ORDER — SERTRALINE HYDROCHLORIDE 100 MG/1
150 TABLET, FILM COATED ORAL DAILY
Qty: 135 TABLET | Refills: 0 | Status: SHIPPED | OUTPATIENT
Start: 2023-03-09 | End: 2023-06-07

## 2023-03-15 ENCOUNTER — HOSPITAL ENCOUNTER (OUTPATIENT)
Dept: ULTRASOUND IMAGING | Facility: HOSPITAL | Age: 33
Discharge: HOME | End: 2023-03-15

## 2023-03-15 DIAGNOSIS — R11.2 NAUSEA AND VOMITING, UNSPECIFIED VOMITING TYPE: ICD-10-CM

## 2023-03-15 DIAGNOSIS — R19.7 DIARRHEA, UNSPECIFIED TYPE: ICD-10-CM

## 2023-03-15 DIAGNOSIS — R10.13 EPIGASTRIC PAIN: ICD-10-CM

## 2023-03-28 ENCOUNTER — TELEPHONE (OUTPATIENT)
Dept: GASTROENTEROLOGY | Facility: CLINIC | Age: 33
End: 2023-03-28

## 2023-03-28 NOTE — TELEPHONE ENCOUNTER
----- Message from Elpidio Guillen PA-C sent at 3/28/2023  2:02 PM EDT -----  Please inform patient that her ultrasound shows a fatty liver  Inform her that I would encourage daily exercise as well as dietary modifications    Thank you

## 2023-03-29 DIAGNOSIS — F41.9 ANXIETY: ICD-10-CM

## 2023-03-30 RX ORDER — BUSPIRONE HYDROCHLORIDE 5 MG/1
TABLET ORAL
Qty: 60 TABLET | Refills: 0 | Status: SHIPPED | OUTPATIENT
Start: 2023-03-30

## 2023-05-05 DIAGNOSIS — F41.9 ANXIETY: ICD-10-CM

## 2023-05-05 NOTE — TELEPHONE ENCOUNTER
Medication:Buspar 5mgs  [facility] protocol  Please forward to your office staff for further review as this medication was reviewed by a [facility] RN

## 2023-05-07 RX ORDER — BUSPIRONE HYDROCHLORIDE 5 MG/1
TABLET ORAL
Qty: 60 TABLET | Refills: 0 | Status: SHIPPED | OUTPATIENT
Start: 2023-05-07

## 2023-05-16 ENCOUNTER — NURSE TRIAGE (OUTPATIENT)
Dept: OTHER | Facility: OTHER | Age: 33
End: 2023-05-16

## 2023-05-16 NOTE — TELEPHONE ENCOUNTER
"Regarding: Prep questions  ----- [facility] at 5/16/2023  9:21 AM EDT -----  Sherri Morris can't find my folder with all my prep instructions   I need some help with what to do\"    "

## 2023-05-16 NOTE — TELEPHONE ENCOUNTER
"Patient called in after misplacing Miralax prep instructions for procedures scheduled for Thursday 5/18/23  Patient is diabetic  Triage RN reviewed clear liquid diet and oral medications with patient  Reviewed prep instructions  Please note patient is diabetic for procedure  Please send Miralax and diabetic medication instructions to patient's email address on profile  Reason for Disposition  • Bowel prep for colonoscopy, questions about    Answer Assessment - Initial Assessment Questions  1  DATE/TIME: \"When did you have your colonoscopy? \"       Scheduled Thursday 5/18/23  2  MAIN CONCERN: Elliott Cat is your main concern right now? \" \"What questions do you have? \"      Miralax prep instructions and diabetic medications    Protocols used: COLONOSCOPY SYMPTOMS AND QUESTIONS-ADULT-    "

## 2023-05-18 ENCOUNTER — HOSPITAL ENCOUNTER (OUTPATIENT)
Dept: GASTROENTEROLOGY | Facility: HOSPITAL | Age: 33
Setting detail: OUTPATIENT SURGERY
End: 2023-05-18

## 2023-05-18 ENCOUNTER — ANESTHESIA EVENT (OUTPATIENT)
Dept: GASTROENTEROLOGY | Facility: HOSPITAL | Age: 33
End: 2023-05-18

## 2023-05-18 ENCOUNTER — ANESTHESIA (OUTPATIENT)
Dept: GASTROENTEROLOGY | Facility: HOSPITAL | Age: 33
End: 2023-05-18

## 2023-05-18 VITALS
DIASTOLIC BLOOD PRESSURE: 85 MMHG | WEIGHT: 293 LBS | BODY MASS INDEX: 53.92 KG/M2 | RESPIRATION RATE: 19 BRPM | OXYGEN SATURATION: 95 % | SYSTOLIC BLOOD PRESSURE: 147 MMHG | HEART RATE: 81 BPM | TEMPERATURE: 97.4 F | HEIGHT: 62 IN

## 2023-05-18 DIAGNOSIS — R19.7 DIARRHEA, UNSPECIFIED TYPE: ICD-10-CM

## 2023-05-18 DIAGNOSIS — R11.2 NAUSEA AND VOMITING, UNSPECIFIED VOMITING TYPE: ICD-10-CM

## 2023-05-18 DIAGNOSIS — R10.13 EPIGASTRIC PAIN: ICD-10-CM

## 2023-05-18 LAB
EXT PREGNANCY TEST URINE: NEGATIVE
EXT. CONTROL: NORMAL
GLUCOSE SERPL-MCNC: 108 MG/DL (ref 65–140)

## 2023-05-18 RX ORDER — PROPOFOL 10 MG/ML
INJECTION, EMULSION INTRAVENOUS AS NEEDED
Status: DISCONTINUED | OUTPATIENT
Start: 2023-05-18 | End: 2023-05-18

## 2023-05-18 RX ORDER — ONDANSETRON 2 MG/ML
4 INJECTION INTRAMUSCULAR; INTRAVENOUS ONCE AS NEEDED
Status: CANCELLED | OUTPATIENT
Start: 2023-05-18

## 2023-05-18 RX ORDER — SODIUM CHLORIDE, SODIUM LACTATE, POTASSIUM CHLORIDE, CALCIUM CHLORIDE 600; 310; 30; 20 MG/100ML; MG/100ML; MG/100ML; MG/100ML
125 INJECTION, SOLUTION INTRAVENOUS CONTINUOUS
Status: DISCONTINUED | OUTPATIENT
Start: 2023-05-18 | End: 2023-05-22 | Stop reason: HOSPADM

## 2023-05-18 RX ORDER — KETAMINE HCL IN NACL, ISO-OSM 100MG/10ML
SYRINGE (ML) INJECTION AS NEEDED
Status: DISCONTINUED | OUTPATIENT
Start: 2023-05-18 | End: 2023-05-18

## 2023-05-18 RX ORDER — SODIUM CHLORIDE, SODIUM LACTATE, POTASSIUM CHLORIDE, CALCIUM CHLORIDE 600; 310; 30; 20 MG/100ML; MG/100ML; MG/100ML; MG/100ML
125 INJECTION, SOLUTION INTRAVENOUS CONTINUOUS
Status: CANCELLED | OUTPATIENT
Start: 2023-05-18

## 2023-05-18 RX ORDER — LIDOCAINE HYDROCHLORIDE 20 MG/ML
INJECTION, SOLUTION EPIDURAL; INFILTRATION; INTRACAUDAL; PERINEURAL AS NEEDED
Status: DISCONTINUED | OUTPATIENT
Start: 2023-05-18 | End: 2023-05-18

## 2023-05-18 RX ORDER — MIDAZOLAM HYDROCHLORIDE 2 MG/2ML
INJECTION, SOLUTION INTRAMUSCULAR; INTRAVENOUS AS NEEDED
Status: DISCONTINUED | OUTPATIENT
Start: 2023-05-18 | End: 2023-05-18

## 2023-05-18 RX ADMIN — LIDOCAINE HYDROCHLORIDE 60 MG: 20 INJECTION, SOLUTION EPIDURAL; INFILTRATION; INTRACAUDAL; PERINEURAL at 09:11

## 2023-05-18 RX ADMIN — MIDAZOLAM 1 MG: 1 INJECTION INTRAMUSCULAR; INTRAVENOUS at 09:12

## 2023-05-18 RX ADMIN — PROPOFOL 20 MG: 10 INJECTION, EMULSION INTRAVENOUS at 09:32

## 2023-05-18 RX ADMIN — SODIUM CHLORIDE, SODIUM LACTATE, POTASSIUM CHLORIDE, AND CALCIUM CHLORIDE: .6; .31; .03; .02 INJECTION, SOLUTION INTRAVENOUS at 09:00

## 2023-05-18 RX ADMIN — MIDAZOLAM 1 MG: 1 INJECTION INTRAMUSCULAR; INTRAVENOUS at 09:15

## 2023-05-18 RX ADMIN — Medication 20 MG: at 09:21

## 2023-05-18 RX ADMIN — PROPOFOL 20 MG: 10 INJECTION, EMULSION INTRAVENOUS at 09:24

## 2023-05-18 RX ADMIN — Medication 30 MG: at 09:18

## 2023-05-18 RX ADMIN — PROPOFOL 20 MG: 10 INJECTION, EMULSION INTRAVENOUS at 09:36

## 2023-05-18 RX ADMIN — PROPOFOL 40 MG: 10 INJECTION, EMULSION INTRAVENOUS at 09:22

## 2023-05-18 RX ADMIN — PROPOFOL 20 MG: 10 INJECTION, EMULSION INTRAVENOUS at 09:30

## 2023-05-18 RX ADMIN — PROPOFOL 120 MG: 10 INJECTION, EMULSION INTRAVENOUS at 09:18

## 2023-05-18 RX ADMIN — LIDOCAINE HYDROCHLORIDE 40 MG: 20 INJECTION, SOLUTION EPIDURAL; INFILTRATION; INTRACAUDAL; PERINEURAL at 09:18

## 2023-05-18 RX ADMIN — PROPOFOL 20 MG: 10 INJECTION, EMULSION INTRAVENOUS at 09:28

## 2023-05-18 RX ADMIN — PROPOFOL 40 MG: 10 INJECTION, EMULSION INTRAVENOUS at 09:20

## 2023-05-18 RX ADMIN — PROPOFOL 20 MG: 10 INJECTION, EMULSION INTRAVENOUS at 09:34

## 2023-05-18 RX ADMIN — PROPOFOL 20 MG: 10 INJECTION, EMULSION INTRAVENOUS at 09:26

## 2023-05-18 NOTE — H&P
History and Physical -  [facility]  Gen Victoria 26 y o  female MRN: [id number]      HPI: Gen Victoria is a 26y o  year old female who presents for evaluation of abdominal pain, bloating, diarrhea, nausea, vomiting      REVIEW OF SYSTEMS: Per the HPI, and otherwise unremarkable  Historical Information   Past Medical History:   Diagnosis Date   • Asthma    • Asthma 02/01/2022    Gets bronchitis with colds  Uses Albuterol for colds  • Depression     Well controlled with Zoloft   • Encounter for gynecological examination without abnormal finding 11/29/2021   • Hidradenitis suppurativa 03/25/2022   • Irritable bowel syndrome    • Morbid obesity with body mass index (BMI) of 60 0 to 69 9 in [facility])    • Multiple gastric ulcers    • Pre-conception counseling 01/31/2022    Getting  August 2022, desires pregnancy Hx PCOS, ovarian drilling Weight reduction advised by Erika Chowdhury -Check A1C Taking multivitamin (confirm folate)   S/p COVID vax (no booster yet, due March 2022)     Past Surgical History:   Procedure Laterality Date   • DILATION AND CURETTAGE OF UTERUS      endometrial thickening   • EGD AND COLONOSCOPY  2013   • OVARY SURGERY      ovarian drilling      Social History   Social History     Substance and Sexual Activity   Alcohol Use Not Currently     Social History     Substance and Sexual Activity   Drug Use Yes   • Types: Marijuana     Social History     Tobacco Use   Smoking Status Never   Smokeless Tobacco Never     Family History   Problem Relation Age of Onset   • No Known Problems Sister    • No Known Problems Sister    • No Known Problems Brother    • No Known Problems Brother    • Diabetes Maternal Grandfather        Meds/Allergies     (Not in a hospital admission)      Allergies   Allergen Reactions   • Iodine - Food Allergy GI Intolerance   • Mite (D  Farinae) Eye Swelling and Itching   • Red Dye - Food Allergy Rash       Objective     Blood pressure 136/64, "pulse 69, temperature 97 6 °F (36 4 °C), temperature source Temporal, resp  rate 14, height 5' 2\" (1 575 m), weight (!) 143 kg (315 lb 0 6 oz), SpO2 95 %  PHYSICAL EXAM    Gen: NAD  CV: RRR  CHEST: Clear  ABD: soft, NT/ND  EXT: no edema      ASSESSMENT/PLAN:  This is a 26y o  year old female here for EGD with biopsies, colonoscopy with biopsies, and she is stable and optimized for her procedure          "

## 2023-05-18 NOTE — ANESTHESIA POSTPROCEDURE EVALUATION
Post-Op Assessment Note    CV Status:  Stable  Pain Score: 0    Pain management: adequate     Mental Status:  Alert and awake   Hydration Status:  Euvolemic   PONV Controlled:  Controlled   Airway Patency:  Patent      Post Op Vitals Reviewed: Yes      Staff: CRNA         No notable events documented      BP   104/54   Temp      Pulse 100   Resp 22   SpO2 95% 4 FM

## 2023-05-18 NOTE — ANESTHESIA PREPROCEDURE EVALUATION
Procedure:  COLONOSCOPY  EGD    Relevant Problems   ANESTHESIA (within normal limits)      CARDIO (within normal limits)      ENDO   (+) Type 2 diabetes mellitus without complication, without long-term current use of insulin (HCC)      NEURO/PSYCH   (+) Recurrent major depressive disorder, in partial remission ([facility] [address] )      PULMONARY (within normal limits)        Physical Exam    Airway    Mallampati score: II  TM Distance: >3 FB  Neck ROM: full     Dental   No notable dental hx     Cardiovascular  Rhythm: regular, Rate: normal,     Pulmonary  Breath sounds clear to auscultation,     Other Findings        Anesthesia Plan  ASA Score- 3     Anesthesia Type- IV sedation with anesthesia with ASA Monitors  Additional Monitors:   Airway Plan:           Plan Factors-Exercise tolerance (METS): >4 METS  Chart reviewed  Existing labs reviewed  Patient summary reviewed  Patient is not a current smoker  Induction-     Postoperative Plan-     Informed Consent- Anesthetic plan and risks discussed with patient  I personally reviewed this patient with the CRNA  Discussed and agreed on the Anesthesia Plan with the CRNA  Thais Hernandez

## 2023-05-25 ENCOUNTER — TELEPHONE (OUTPATIENT)
Dept: GASTROENTEROLOGY | Facility: CLINIC | Age: 33
End: 2023-05-25

## 2023-05-25 NOTE — TELEPHONE ENCOUNTER
----- Message from Telma Ingram DO sent at 5/24/2023  9:04 PM EDT -----  Please call the patient with the biopsy results  Biopsy of the duodenum were benign and negative for celiac disease or cancer  Biopsies of stomach were benign and negative for Helicobacter pylori or for cancer  Biopsies of the colon were benign and negative for microscopic colitis

## 2023-05-25 NOTE — TELEPHONE ENCOUNTER
Called and spoke with patient in regards to her biopsy results  As per Dr Mcclendon Fling of the duodenum were benign and negative for celiac disease or cancer   Biopsies of stomach were benign and negative for Helicobacter pylori or for cancer   Biopsies of the colon were benign and negative for microscopic colitis  Pt voiced understanding

## 2023-05-30 DIAGNOSIS — F41.9 ANXIETY: ICD-10-CM

## 2023-05-31 RX ORDER — BUSPIRONE HYDROCHLORIDE 5 MG/1
TABLET ORAL
Qty: 60 TABLET | Refills: 0 | Status: SHIPPED | OUTPATIENT
Start: 2023-05-31

## 2023-06-15 DIAGNOSIS — F33.41 RECURRENT MAJOR DEPRESSIVE DISORDER, IN PARTIAL REMISSION (HCC): ICD-10-CM

## 2023-06-15 RX ORDER — SERTRALINE HYDROCHLORIDE 100 MG/1
TABLET, FILM COATED ORAL
Qty: 135 TABLET | Refills: 0 | Status: SHIPPED | OUTPATIENT
Start: 2023-06-15

## 2023-08-08 DIAGNOSIS — F41.9 ANXIETY: ICD-10-CM

## 2023-08-08 DIAGNOSIS — F33.41 RECURRENT MAJOR DEPRESSIVE DISORDER, IN PARTIAL REMISSION (HCC): ICD-10-CM

## 2023-08-09 RX ORDER — SERTRALINE HYDROCHLORIDE 100 MG/1
TABLET, FILM COATED ORAL
Qty: 135 TABLET | Refills: 0 | Status: SHIPPED | OUTPATIENT
Start: 2023-08-09

## 2023-08-09 RX ORDER — BUSPIRONE HYDROCHLORIDE 5 MG/1
TABLET ORAL
Qty: 60 TABLET | Refills: 0 | Status: SHIPPED | OUTPATIENT
Start: 2023-08-09

## 2023-10-15 DIAGNOSIS — R10.13 EPIGASTRIC PAIN: ICD-10-CM

## 2023-10-15 DIAGNOSIS — E11.9 TYPE 2 DIABETES MELLITUS WITHOUT COMPLICATION, WITHOUT LONG-TERM CURRENT USE OF INSULIN (HCC): ICD-10-CM

## 2023-10-16 RX ORDER — PANTOPRAZOLE SODIUM 40 MG/1
TABLET, DELAYED RELEASE ORAL
Qty: 30 TABLET | Refills: 1 | Status: SHIPPED | OUTPATIENT
Start: 2023-10-16

## 2023-10-16 RX ORDER — TIRZEPATIDE 5 MG/.5ML
INJECTION, SOLUTION SUBCUTANEOUS
Qty: 6 ML | Refills: 1 | Status: SHIPPED | OUTPATIENT
Start: 2023-10-16 | End: 2023-10-17

## 2023-10-17 ENCOUNTER — OFFICE VISIT (OUTPATIENT)
Dept: FAMILY MEDICINE CLINIC | Facility: CLINIC | Age: 33
End: 2023-10-17
Payer: COMMERCIAL

## 2023-10-17 VITALS
HEART RATE: 66 BPM | SYSTOLIC BLOOD PRESSURE: 118 MMHG | HEIGHT: 62 IN | WEIGHT: 293 LBS | OXYGEN SATURATION: 96 % | DIASTOLIC BLOOD PRESSURE: 72 MMHG | TEMPERATURE: 97.6 F | BODY MASS INDEX: 53.92 KG/M2

## 2023-10-17 DIAGNOSIS — F33.41 RECURRENT MAJOR DEPRESSIVE DISORDER, IN PARTIAL REMISSION (HCC): ICD-10-CM

## 2023-10-17 DIAGNOSIS — E28.2 PCOS (POLYCYSTIC OVARIAN SYNDROME): ICD-10-CM

## 2023-10-17 DIAGNOSIS — E11.9 TYPE 2 DIABETES MELLITUS WITHOUT COMPLICATION, WITHOUT LONG-TERM CURRENT USE OF INSULIN (HCC): Primary | ICD-10-CM

## 2023-10-17 LAB
CREAT UR-MCNC: 145.8 MG/DL
LEFT EYE DIABETIC RETINOPATHY: NORMAL
LEFT EYE IMAGE QUALITY: NORMAL
LEFT EYE MACULAR EDEMA: NORMAL
LEFT EYE OTHER RETINOPATHY: NORMAL
MICROALBUMIN UR-MCNC: 56.7 MG/L
MICROALBUMIN/CREAT 24H UR: 39 MG/G CREATININE (ref 0–30)
RIGHT EYE DIABETIC RETINOPATHY: NORMAL
RIGHT EYE IMAGE QUALITY: NORMAL
RIGHT EYE MACULAR EDEMA: NORMAL
RIGHT EYE OTHER RETINOPATHY: NORMAL
SEVERITY (EYE EXAM): NORMAL
SL AMB POCT HEMOGLOBIN AIC: 5.2 (ref ?–6.5)

## 2023-10-17 PROCEDURE — 83036 HEMOGLOBIN GLYCOSYLATED A1C: CPT | Performed by: STUDENT IN AN ORGANIZED HEALTH CARE EDUCATION/TRAINING PROGRAM

## 2023-10-17 PROCEDURE — 82043 UR ALBUMIN QUANTITATIVE: CPT | Performed by: STUDENT IN AN ORGANIZED HEALTH CARE EDUCATION/TRAINING PROGRAM

## 2023-10-17 PROCEDURE — 82570 ASSAY OF URINE CREATININE: CPT | Performed by: STUDENT IN AN ORGANIZED HEALTH CARE EDUCATION/TRAINING PROGRAM

## 2023-10-17 PROCEDURE — 99214 OFFICE O/P EST MOD 30 MIN: CPT | Performed by: STUDENT IN AN ORGANIZED HEALTH CARE EDUCATION/TRAINING PROGRAM

## 2023-10-17 RX ORDER — ONDANSETRON 4 MG/1
4 TABLET, FILM COATED ORAL EVERY 8 HOURS PRN
Qty: 20 TABLET | Refills: 0 | Status: SHIPPED | OUTPATIENT
Start: 2023-10-17

## 2023-10-17 RX ORDER — BUPROPION HYDROCHLORIDE 150 MG/1
150 TABLET ORAL EVERY MORNING
Qty: 90 TABLET | Refills: 1 | Status: SHIPPED | OUTPATIENT
Start: 2023-10-17 | End: 2024-10-11

## 2023-10-17 NOTE — PROGRESS NOTES
Assessment/Plan:         Problem List Items Addressed This Visit        Endocrine    Type 2 diabetes mellitus without complication, without long-term current use of insulin (HCC) - Primary    Relevant Medications    tirzepatide 7.5 MG/0.5ML    ondansetron (ZOFRAN) 4 mg tablet    Other Relevant Orders    IRIS Diabetic eye exam    POCT hemoglobin A1c (Completed)    Comprehensive metabolic panel    TSH, 3rd generation with Free T4 reflex    Lipid Panel with Direct LDL reflex    Albumin / creatinine urine ratio    PCOS (polycystic ovarian syndrome)    Relevant Medications    tirzepatide 7.5 MG/0.5ML       Other    Recurrent major depressive disorder, in partial remission (HCC)    Relevant Medications    buPROPion (WELLBUTRIN XL) 150 mg 24 hr tablet       Add on wellbutrin to augment zoloft    Subjective:      Patient ID: Katherin Sullivan is a 26 y.o. female. HPI    Difficult summer, lost a few family members. Not taking busp[ar, did not feel like it was working. Also worried about polypharmacy and serotonin syndrome. Still with difficulties with work, getting out of work      Not taking metformin, was getting low blood sugars on both. When mounjaro dosage increases causes some N.V, bloating, and diarrhea         The following portions of the patient's history were reviewed and updated as appropriate:   Past Medical History:  She has a past medical history of Asthma, Asthma (02/01/2022), Depression, Diabetes mellitus ([facility]), Encounter for gynecological examination without abnormal finding (11/29/2021), Hidradenitis suppurativa (03/25/2022), Irritable bowel syndrome, Morbid obesity with body mass index (BMI) of 60.0 to 69.9 in adult [facility]), Multiple gastric ulcers, and Pre-conception counseling (01/31/2022). ,  _______________________________________________________________________  Medical Problems:  does not have any pertinent problems on file.,  _______________________________________________________________________  Past Surgical History:   has a past surgical history that includes Ovary surgery; Dilation and curettage of uterus; and EGD AND COLONOSCOPY (2013). ,  _______________________________________________________________________  Family History:  family history includes Diabetes in her maternal grandfather; No Known Problems in her brother, brother, sister, and sister. ,  _______________________________________________________________________  Social History:   reports that she has never smoked. She has never used smokeless tobacco. She reports that she does not currently use alcohol. She reports current drug use. Drug: Marijuana. ,  _______________________________________________________________________  Allergies:  is allergic to iodine - food allergy, mite (d. farinae), and red dye - food allergy. .  _______________________________________________________________________  Current Outpatient Medications   Medication Sig Dispense Refill   • albuterol (2.5 mg/3 mL) 0.083 % nebulizer solution Take 6 mL (5 mg total) by nebulization every 6 (six) hours as needed for wheezing (bronchospasm/initial rx.) 75 mL 0   • albuterol (PROVENTIL HFA,VENTOLIN HFA) 90 mcg/act inhaler Inhale 2 puffs every 4 (four) hours as needed     • Biotin w/ Vitamins C & E (HAIR SKIN & NAILS GUMMIES PO)      • buPROPion (WELLBUTRIN XL) 150 mg 24 hr tablet Take 1 tablet (150 mg total) by mouth every morning 90 tablet 1   • Multiple Vitamin (multivitamin) tablet Take 1 tablet by mouth daily     • ondansetron (ZOFRAN) 4 mg tablet Take 1 tablet (4 mg total) by mouth every 8 (eight) hours as needed for nausea or vomiting 20 tablet 0   • pantoprazole (PROTONIX) 40 mg tablet TAKE ONE TABLET BY MOUTH EVERY DAY 30 tablet 1   • sertraline (ZOLOFT) 100 mg tablet TAKE ONE AND ONE-HALF TABLETS BY MOUTH EVERY  tablet 0   • tirzepatide 7.5 MG/0.5ML Inject 0.5 mL (7.5 mg total) under the skin every 7 days 6 mL 0   • triamcinolone (KENALOG) 0.1 % ointment Apply topically 2 (two) times a day 30 g 0     No current facility-administered medications for this visit.     _______________________________________________________________________  Review of Systems   Constitutional:  Negative for chills, fatigue and fever. HENT:  Negative for rhinorrhea and sore throat. Eyes:  Negative for visual disturbance. Respiratory:  Negative for cough and shortness of breath. Cardiovascular:  Negative for chest pain and palpitations. Gastrointestinal:  Negative for abdominal pain, constipation, diarrhea, nausea and vomiting. Genitourinary:  Negative for difficulty urinating, dysuria and frequency. Musculoskeletal:  Negative for arthralgias and myalgias. Skin:  Negative for color change and rash. Neurological:  Negative for weakness and headaches. Objective:  Vitals:    10/17/23 0759   BP: 118/72   BP Location: Left arm   Patient Position: Sitting   Cuff Size: Standard   Pulse: 66   Temp: 97.6 °F (36.4 °C)   SpO2: 96%   Weight: (!) 141 kg (310 lb)   Height: 5' 2" (1.575 m)     Body mass index is 56.7 kg/m². Physical Exam  Constitutional:       General: She is not in acute distress. Appearance: Normal appearance. She is obese. She is not ill-appearing. HENT:      Head: Normocephalic and atraumatic. Right Ear: External ear normal.      Left Ear: External ear normal.      Nose: Nose normal. No congestion or rhinorrhea. Mouth/Throat:      Mouth: Mucous membranes are moist.      Pharynx: Oropharynx is clear. No oropharyngeal exudate or posterior oropharyngeal erythema. Eyes:      Extraocular Movements: Extraocular movements intact. Conjunctiva/sclera: Conjunctivae normal.      Pupils: Pupils are equal, round, and reactive to light. Cardiovascular:      Rate and Rhythm: Normal rate and regular rhythm. Pulses: Normal pulses.       Heart sounds: No murmur heard.  Pulmonary:      Effort: Pulmonary effort is normal. No respiratory distress. Breath sounds: Normal breath sounds. No wheezing. Chest:      Chest wall: No tenderness. Abdominal:      General: Bowel sounds are normal.      Palpations: Abdomen is soft. Tenderness: There is no abdominal tenderness. Musculoskeletal:         General: Normal range of motion. Cervical back: Normal range of motion. Skin:     General: Skin is warm and dry. Capillary Refill: Capillary refill takes less than 2 seconds. Findings: No rash. Neurological:      General: No focal deficit present. Mental Status: She is alert. Mental status is at baseline.

## 2023-11-13 ENCOUNTER — TELEPHONE (OUTPATIENT)
Dept: FAMILY MEDICINE CLINIC | Facility: CLINIC | Age: 33
End: 2023-11-13

## 2023-11-13 DIAGNOSIS — E11.9 TYPE 2 DIABETES MELLITUS WITHOUT COMPLICATION, WITHOUT LONG-TERM CURRENT USE OF INSULIN (HCC): Primary | ICD-10-CM

## 2023-11-13 DIAGNOSIS — E66.01 MORBID OBESITY WITH BMI OF 50.0-59.9, ADULT (HCC): ICD-10-CM

## 2023-11-14 ENCOUNTER — APPOINTMENT (OUTPATIENT)
Age: 33
End: 2023-11-14
Payer: COMMERCIAL

## 2023-11-14 DIAGNOSIS — E11.9 TYPE 2 DIABETES MELLITUS WITHOUT COMPLICATION, WITHOUT LONG-TERM CURRENT USE OF INSULIN (HCC): ICD-10-CM

## 2023-11-14 LAB
ALBUMIN SERPL BCP-MCNC: 4.1 G/DL (ref 3.5–5)
ALP SERPL-CCNC: 61 U/L (ref 34–104)
ALT SERPL W P-5'-P-CCNC: 14 U/L (ref 7–52)
ANION GAP SERPL CALCULATED.3IONS-SCNC: 9 MMOL/L
AST SERPL W P-5'-P-CCNC: 18 U/L (ref 13–39)
BILIRUB SERPL-MCNC: 0.29 MG/DL (ref 0.2–1)
BUN SERPL-MCNC: 9 MG/DL (ref 5–25)
CALCIUM SERPL-MCNC: 9.3 MG/DL (ref 8.4–10.2)
CHLORIDE SERPL-SCNC: 105 MMOL/L (ref 96–108)
CHOLEST SERPL-MCNC: 200 MG/DL
CO2 SERPL-SCNC: 28 MMOL/L (ref 21–32)
CREAT SERPL-MCNC: 0.63 MG/DL (ref 0.6–1.3)
GFR SERPL CREATININE-BSD FRML MDRD: 116 ML/MIN/1.73SQ M
GLUCOSE P FAST SERPL-MCNC: 92 MG/DL (ref 65–99)
HDLC SERPL-MCNC: 47 MG/DL
LDLC SERPL CALC-MCNC: 126 MG/DL (ref 0–100)
POTASSIUM SERPL-SCNC: 3.9 MMOL/L (ref 3.5–5.3)
PROT SERPL-MCNC: 7.3 G/DL (ref 6.4–8.4)
SODIUM SERPL-SCNC: 142 MMOL/L (ref 135–147)
TRIGL SERPL-MCNC: 135 MG/DL
TSH SERPL DL<=0.05 MIU/L-ACNC: 2.24 UIU/ML (ref 0.45–4.5)

## 2023-11-14 PROCEDURE — 36415 COLL VENOUS BLD VENIPUNCTURE: CPT

## 2023-11-14 PROCEDURE — 80053 COMPREHEN METABOLIC PANEL: CPT

## 2023-11-14 PROCEDURE — 80061 LIPID PANEL: CPT

## 2023-11-14 PROCEDURE — 84443 ASSAY THYROID STIM HORMONE: CPT

## 2023-11-23 DIAGNOSIS — R05.9 COUGH: ICD-10-CM

## 2023-11-24 RX ORDER — ALBUTEROL SULFATE 90 UG/1
2 AEROSOL, METERED RESPIRATORY (INHALATION) EVERY 4 HOURS PRN
Qty: 6.7 G | Refills: 5 | Status: SHIPPED | OUTPATIENT
Start: 2023-11-24

## 2023-11-24 RX ORDER — ALBUTEROL SULFATE 2.5 MG/3ML
5 SOLUTION RESPIRATORY (INHALATION) EVERY 6 HOURS PRN
Qty: 75 ML | Refills: 0 | Status: SHIPPED | OUTPATIENT
Start: 2023-11-24

## 2023-12-15 ENCOUNTER — TELEPHONE (OUTPATIENT)
Dept: BARIATRICS | Facility: CLINIC | Age: 33
End: 2023-12-15

## 2023-12-20 DIAGNOSIS — E11.9 TYPE 2 DIABETES MELLITUS WITHOUT COMPLICATION, WITHOUT LONG-TERM CURRENT USE OF INSULIN (HCC): ICD-10-CM

## 2023-12-20 DIAGNOSIS — F33.41 RECURRENT MAJOR DEPRESSIVE DISORDER, IN PARTIAL REMISSION (HCC): ICD-10-CM

## 2023-12-20 DIAGNOSIS — R10.13 EPIGASTRIC PAIN: ICD-10-CM

## 2023-12-20 RX ORDER — PANTOPRAZOLE SODIUM 40 MG/1
TABLET, DELAYED RELEASE ORAL
Qty: 30 TABLET | Refills: 5 | Status: SHIPPED | OUTPATIENT
Start: 2023-12-20

## 2023-12-20 RX ORDER — SERTRALINE HYDROCHLORIDE 100 MG/1
TABLET, FILM COATED ORAL
Qty: 135 TABLET | Refills: 0 | Status: SHIPPED | OUTPATIENT
Start: 2023-12-20

## 2023-12-20 RX ORDER — ONDANSETRON 4 MG/1
TABLET, FILM COATED ORAL
Qty: 20 TABLET | Refills: 0 | Status: SHIPPED | OUTPATIENT
Start: 2023-12-20

## 2023-12-28 ENCOUNTER — OFFICE VISIT (OUTPATIENT)
Dept: BARIATRICS | Facility: CLINIC | Age: 33
End: 2023-12-28

## 2023-12-28 VITALS
WEIGHT: 293 LBS | HEART RATE: 85 BPM | RESPIRATION RATE: 14 BRPM | BODY MASS INDEX: 51.91 KG/M2 | SYSTOLIC BLOOD PRESSURE: 128 MMHG | HEIGHT: 63 IN | DIASTOLIC BLOOD PRESSURE: 80 MMHG

## 2023-12-28 DIAGNOSIS — E11.9 TYPE 2 DIABETES MELLITUS WITHOUT COMPLICATION, WITHOUT LONG-TERM CURRENT USE OF INSULIN (HCC): ICD-10-CM

## 2023-12-28 DIAGNOSIS — Z01.818 ENCOUNTER FOR OTHER PREPROCEDURAL EXAMINATION: Primary | ICD-10-CM

## 2023-12-28 DIAGNOSIS — E66.01 MORBID (SEVERE) OBESITY DUE TO EXCESS CALORIES (HCC): ICD-10-CM

## 2023-12-28 DIAGNOSIS — E66.01 CLASS 3 SEVERE OBESITY DUE TO EXCESS CALORIES WITH SERIOUS COMORBIDITY AND BODY MASS INDEX (BMI) OF 50.0 TO 59.9 IN ADULT (HCC): ICD-10-CM

## 2023-12-28 DIAGNOSIS — F33.41 RECURRENT MAJOR DEPRESSIVE DISORDER, IN PARTIAL REMISSION (HCC): ICD-10-CM

## 2023-12-28 DIAGNOSIS — E28.2 PCOS (POLYCYSTIC OVARIAN SYNDROME): ICD-10-CM

## 2023-12-28 PROBLEM — E66.813 CLASS 3 SEVERE OBESITY DUE TO EXCESS CALORIES WITH SERIOUS COMORBIDITY AND BODY MASS INDEX (BMI) OF 50.0 TO 59.9 IN ADULT (HCC): Status: ACTIVE | Noted: 2023-12-28

## 2023-12-28 PROCEDURE — 99024 POSTOP FOLLOW-UP VISIT: CPT | Performed by: SURGERY

## 2023-12-28 NOTE — PROGRESS NOTES
BARIATRIC INITIAL CONSULT - BARIATRIC SURGERY    Kiana Kapoor 33 y.o. female MRN: [id number]  Unit/Bed#:  Encounter: [id number]      HPI:  Kiana Kapoor is a 33 y.o. female who presents with a longstanding history of morbid obesity and inability to sustain a meaningful weight loss.  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 She has chickens and 4 cats. She is pending a MW consult and is present for a discussion about bariatric surgery options. +GERD. Denies tobacco. Denies DVT/PE.   Here today to discuss bariatric options.    Visit type: initial visit    Symptoms: excess weight and joint pain    Associated Symptoms: none    Associated Conditions: glucose intolerance, hyperlipidemia, low HDL, and elevated LDL  Disease Complications: diabetes and PCOS  Weight Loss Interest: high    Exercise Frequency:daily  Types of Exercise: walking      Review of Systems    Historical Information   Past Medical History:   Diagnosis Date    Asthma     Asthma 02/01/2022    Gets bronchitis with colds. Uses Albuterol for colds.     Depression     Well controlled with Zoloft    Diabetes mellitus ([facility])     Encounter for gynecological examination without abnormal finding 11/29/2021    Hidradenitis suppurativa 03/25/2022    Irritable bowel syndrome     Morbid obesity with body mass index (BMI) of 60.0 to 69.9 in adult ([facility])     Multiple gastric ulcers     Pre-conception counseling 01/31/2022    Getting  August 2022, desires pregnancy Hx PCOS, ovarian drilling Weight reduction advised by THANIA Quintero -Check A1C Taking multivitamin (confirm folate). S/p COVID vax (no booster yet, due March 2022)     Past Surgical History:   Procedure Laterality Date    DILATION AND CURETTAGE OF UTERUS      endometrial thickening    EGD AND COLONOSCOPY  2013    OVARY SURGERY      ovarian drilling      Social History   Social History     Substance and Sexual Activity   Alcohol Use Not Currently     Social History     Substance and Sexual  "Activity   Drug Use Yes    Types: Marijuana     Social History     Tobacco Use   Smoking Status Never   Smokeless Tobacco Never     Family History: non-contributory    Meds/Allergies   all medications and allergies reviewed  Allergies   Allergen Reactions    Iodine - Food Allergy GI Intolerance    Mite (D. Farinae) Eye Swelling and Itching    Red Dye - Food Allergy Rash       Objective       Current Vitals:   /80 (BP Location: Left arm, Patient Position: Sitting, Cuff Size: Large)   Pulse 85   Resp 14   Ht 5' 2.5\" (1.588 m)   Wt (!) 137 kg (301 lb)   BMI 54.18 kg/m²       Invasive Devices       None                   Physical Exam    Lab Results: I have personally reviewed pertinent lab results.    Imaging: I have personally reviewed pertinent reports.    EKG, Pathology, and Other Studies: I have personally reviewed pertinent reports.        Assessment/PLAN:    33 y.o. yo female with a long standing h/o of obesity and inability to sustain any meaningful weight loss on her own despite several attempts.    She is interested in the Laparoscopic sharona-en-y gastric bypass.    As a part of her pre op evaluation, she will be referred to a cardiologist and for a sleep evaluation and consult after successfully completing an evaluation with our pre-certification/, registered dietician and licensed clinical .    She had an EGD to evaluate the anatomy of her GI tract prior to the operation.  I have spent over 45 minutes with her face to face in the office today discussing her options and details of the surgery. Over 50% of this was coordinating care.    She was given the opportunity to ask questions and I have answered all of them.  I have discussed and educated the patient with regards to the components of our multidisciplinary program and the importance of compliance and follow up in the post operative period. The patient was also instructed with regards to the importance of behavior " modification, nutritional counseling, support meeting attendance and lifestyle changes that are important to ensure success.     Although there is a great statistical chance of improvement or even resolution of most of her associated comorbidities, the results vary from patient to patient and they largely depend on her commitment and compliance.     Raisa Parsons MD  12/28/2023  8:48 AM

## 2023-12-28 NOTE — LETTER
December 28, 2023     Rogelio Hough MD  [address]  [facility] [facility] [id number]    Patient: Kiana Kapoor   YOB: 1990   Date of Visit: 12/28/2023       Dear Dr. Hough:    Thank you for referring Kiana Kapoor to me for evaluation for metabolic and bariatric surgery. Below are my notes for this consultation.    If you have questions, please do not hesitate to call me. I look forward to following your patient along with you.         Sincerely,        Raisa Parsons MD        CC: No Recipients    Raisa Parsons MD  12/28/2023  8:53 AM  Sign when Signing Visit      BARIATRIC INITIAL CONSULT - BARIATRIC SURGERY    Kiana Kapoor 33 y.o. female MRN: [id number]  Unit/Bed#:  Encounter: [id number]      HPI:  Kiana Kapoor is a 33 y.o. female who presents with a longstanding history of morbid obesity and inability to sustain a meaningful weight loss.  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 She has chickens and 4 cats. She is pending a [facility] consult and is present for a discussion about bariatric surgery options. +GERD. Denies tobacco. Denies DVT/PE.   Here today to discuss bariatric options.    Visit type: initial visit    Symptoms: excess weight and joint pain    Associated Symptoms: none    Associated Conditions: glucose intolerance, hyperlipidemia, low HDL, and elevated LDL  Disease Complications: diabetes and PCOS  Weight Loss Interest: high    Exercise Frequency:daily  Types of Exercise: walking      Review of Systems    Historical Information  Past Medical History:   Diagnosis Date   • Asthma    • Asthma 02/01/2022    Gets bronchitis with colds. Uses Albuterol for colds.    • Depression     Well controlled with Zoloft   • Diabetes mellitus (HCC)    • Encounter for gynecological examination without abnormal finding 11/29/2021   • Hidradenitis suppurativa 03/25/2022   • Irritable bowel syndrome    • Morbid obesity with body mass index (BMI) of 60.0 to 69.9 in adult (HCC)   "  • Multiple gastric ulcers    • Pre-conception counseling 01/31/2022    Getting  August 2022, desires pregnancy Hx PCOS, ovarian drilling Weight reduction advised by THANIA Quintero -Check A1C Taking multivitamin (confirm folate). S/p COVID vax (no booster yet, due March 2022)     Past Surgical History:   Procedure Laterality Date   • DILATION AND CURETTAGE OF UTERUS      endometrial thickening   • EGD AND COLONOSCOPY  2013   • OVARY SURGERY      ovarian drilling      Social History  Social History     Substance and Sexual Activity   Alcohol Use Not Currently     Social History     Substance and Sexual Activity   Drug Use Yes   • Types: Marijuana     Social History     Tobacco Use   Smoking Status Never   Smokeless Tobacco Never     Family History: non-contributory    Meds/Allergies  all medications and allergies reviewed  Allergies   Allergen Reactions   • Iodine - Food Allergy GI Intolerance   • Mite (D. Farinae) Eye Swelling and Itching   • Red Dye - Food Allergy Rash       Objective      Current Vitals:   /80 (BP Location: Left arm, Patient Position: Sitting, Cuff Size: Large)   Pulse 85   Resp 14   Ht 5' 2.5\" (1.588 m)   Wt (!) 137 kg (301 lb)   BMI 54.18 kg/m²       Invasive Devices       None                   Physical Exam    Lab Results: I have personally reviewed pertinent lab results.    Imaging: I have personally reviewed pertinent reports.    EKG, Pathology, and Other Studies: I have personally reviewed pertinent reports.        Assessment/PLAN:    33 y.o. yo female with a long standing h/o of obesity and inability to sustain any meaningful weight loss on her own despite several attempts.    She is interested in the Laparoscopic sharona-en-y gastric bypass.    As a part of her pre op evaluation, she will be referred to a cardiologist and for a sleep evaluation and consult after successfully completing an evaluation with our pre-certification/, registered dietician and " licensed clinical .    She had an EGD to evaluate the anatomy of her GI tract prior to the operation.  I have spent over 45 minutes with her face to face in the office today discussing her options and details of the surgery. Over 50% of this was coordinating care.    She was given the opportunity to ask questions and I have answered all of them.  I have discussed and educated the patient with regards to the components of our multidisciplinary program and the importance of compliance and follow up in the post operative period. The patient was also instructed with regards to the importance of behavior modification, nutritional counseling, support meeting attendance and lifestyle changes that are important to ensure success.     Although there is a great statistical chance of improvement or even resolution of most of her associated comorbidities, the results vary from patient to patient and they largely depend on her commitment and compliance.     Raisa Parsons MD  12/28/2023  8:48 AM

## 2024-01-10 ENCOUNTER — TELEPHONE (OUTPATIENT)
Dept: BARIATRICS | Facility: CLINIC | Age: 34
End: 2024-01-10

## 2024-01-10 NOTE — TELEPHONE ENCOUNTER
[facility] Patient Eligibility and Benefit Details for Surgery  *Not a guarantee of payment    Today's Date: 1/10/2024    Insurance [employer] ([id number])  Ins Phone: [phone]    Effective date: 12/1/2023  Term Date: Current    Representative name: Rosette RAMIREZ  Reference number for call: [id number]    Does the patient have the benefit written on policy: Yes        If applicable: Are revisions covered under this policy: Yes          If patient is considering paying out of pocket, provide information: Financial Counselor/  [phone]    Having health insurance coverage is not a guarantee that your individual plan covers weight-loss surgery or that you will be approved for surgery.

## 2024-01-16 ENCOUNTER — OFFICE VISIT (OUTPATIENT)
Dept: BARIATRICS | Facility: CLINIC | Age: 34
End: 2024-01-16

## 2024-01-16 VITALS — WEIGHT: 293 LBS | HEIGHT: 63 IN | BODY MASS INDEX: 51.91 KG/M2

## 2024-01-16 DIAGNOSIS — Z98.84 BARIATRIC SURGERY STATUS: Primary | ICD-10-CM

## 2024-01-16 DIAGNOSIS — E66.01 MORBID (SEVERE) OBESITY DUE TO EXCESS CALORIES (HCC): Primary | ICD-10-CM

## 2024-01-16 PROCEDURE — RECHECK

## 2024-01-24 ENCOUNTER — OFFICE VISIT (OUTPATIENT)
Dept: FAMILY MEDICINE CLINIC | Facility: CLINIC | Age: 34
End: 2024-01-24
Payer: COMMERCIAL

## 2024-01-24 VITALS
DIASTOLIC BLOOD PRESSURE: 84 MMHG | WEIGHT: 293 LBS | OXYGEN SATURATION: 98 % | SYSTOLIC BLOOD PRESSURE: 126 MMHG | HEART RATE: 72 BPM | BODY MASS INDEX: 51.91 KG/M2 | HEIGHT: 63 IN

## 2024-01-24 DIAGNOSIS — E11.9 TYPE 2 DIABETES MELLITUS WITHOUT COMPLICATION, WITHOUT LONG-TERM CURRENT USE OF INSULIN (HCC): Primary | ICD-10-CM

## 2024-01-24 DIAGNOSIS — F33.41 RECURRENT MAJOR DEPRESSIVE DISORDER, IN PARTIAL REMISSION (HCC): ICD-10-CM

## 2024-01-24 LAB — SL AMB POCT HEMOGLOBIN AIC: 5.4 (ref ?–6.5)

## 2024-01-24 PROCEDURE — 83036 HEMOGLOBIN GLYCOSYLATED A1C: CPT | Performed by: STUDENT IN AN ORGANIZED HEALTH CARE EDUCATION/TRAINING PROGRAM

## 2024-01-24 PROCEDURE — 99214 OFFICE O/P EST MOD 30 MIN: CPT | Performed by: STUDENT IN AN ORGANIZED HEALTH CARE EDUCATION/TRAINING PROGRAM

## 2024-01-24 RX ORDER — METFORMIN HYDROCHLORIDE 500 MG/1
500 TABLET, EXTENDED RELEASE ORAL
Qty: 90 TABLET | Refills: 1 | Status: SHIPPED | OUTPATIENT
Start: 2024-01-24

## 2024-01-24 NOTE — ASSESSMENT & PLAN NOTE
Stop wellbutrin as she has worsened, call in 2-3 weeks. If symptoms not improving, will trial abilify

## 2024-01-24 NOTE — ASSESSMENT & PLAN NOTE
Lab Results   Component Value Date    HGBA1C 5.4 01/24/2024     Stop mounjaro, will restart the metformin next week. Is going through bariatric surgery consulation as well

## 2024-01-24 NOTE — PROGRESS NOTES
Assessment/Plan:      Problem List Items Addressed This Visit        Endocrine    Type 2 diabetes mellitus without complication, without long-term current use of insulin ([facility]) - Primary       Lab Results   Component Value Date    HGBA1C 5.4 01/24/2024     Stop mounjaro, will restart the metformin next week. Is going through bariatric surgery consulation as well         Relevant Medications    metFORMIN (GLUCOPHAGE-XR) 500 mg 24 hr tablet    Other Relevant Orders    POCT hemoglobin A1c (Completed)       Other    Recurrent major depressive disorder, in partial remission ([facility])     Stop wellbutrin as she has worsened, call in 2-3 weeks. If symptoms not improving, will trial abilify              Subjective:      Patient ID: Kiana Kapoor is a 34 y.o. female.    HPI    On mounjaro lots of GI side effects/ nausea, bloating and upset stomach 3-4 days after injections    Anxiety/depression has been terrible. Does see a therapist. Was suggested to stop wellbutrin and told to trial abilify as she was on that in the past. Has failed celexa, prozac,    The following portions of the patient's history were reviewed and updated as appropriate:   Past Medical History:  She has a past medical history of Asthma, Asthma (02/01/2022), Depression, Diabetes mellitus ([facility]), Encounter for gynecological examination without abnormal finding (11/29/2021), Hidradenitis suppurativa (03/25/2022), Irritable bowel syndrome, Morbid obesity with body mass index (BMI) of 60.0 to 69.9 in adult ([facility]), Multiple gastric ulcers, and Pre-conception counseling (01/31/2022).,  _______________________________________________________________________  Medical Problems:  does not have any pertinent problems on file.,  _______________________________________________________________________  Past Surgical History:   has a past surgical history that includes Ovary surgery; Dilation and curettage of uterus; and EGD AND COLONOSCOPY  (2013).,  _______________________________________________________________________  Family History:  family history includes Diabetes in her maternal grandfather; No Known Problems in her brother, brother, sister, and sister.,  _______________________________________________________________________  Social History:   reports that she has never smoked. She has never used smokeless tobacco. She reports that she does not currently use alcohol. She reports current drug use. Drug: Marijuana.,  _______________________________________________________________________  Allergies:  is allergic to iodine - food allergy, mite (d. farinae), and red dye - food allergy..  _______________________________________________________________________  Current Outpatient Medications   Medication Sig Dispense Refill   • albuterol (2.5 mg/3 mL) 0.083 % nebulizer solution Take 6 mL (5 mg total) by nebulization every 6 (six) hours as needed for wheezing (bronchospasm/initial rx.) 75 mL 0   • albuterol (PROVENTIL HFA,VENTOLIN HFA) 90 mcg/act inhaler Inhale 2 puffs every 4 (four) hours as needed for wheezing 6.7 g 5   • Biotin w/ Vitamins C & E (HAIR SKIN & NAILS GUMMIES PO)      • metFORMIN (GLUCOPHAGE-XR) 500 mg 24 hr tablet Take 1 tablet (500 mg total) by mouth daily with dinner 90 tablet 1   • Multiple Vitamin (multivitamin) tablet Take 1 tablet by mouth daily     • ondansetron (ZOFRAN) 4 mg tablet TAKE ONE TABLET BY MOUTH EVERY 8 HOURS AS NEEDED FOR NAUSEA AND VOMITING 20 tablet 0   • pantoprazole (PROTONIX) 40 mg tablet TAKE ONE TABLET BY MOUTH EVERY DAY 30 tablet 5   • sertraline (ZOLOFT) 100 mg tablet TAKE ONE AND ONE-HALF TABLETS BY MOUTH EVERY  tablet 0   • triamcinolone (KENALOG) 0.1 % ointment Apply topically 2 (two) times a day 30 g 0     No current facility-administered medications for this visit.     _______________________________________________________________________  Review of Systems   Constitutional:  Negative for  "chills, fatigue and fever.   HENT:  Negative for rhinorrhea and sore throat.    Eyes:  Negative for visual disturbance.   Respiratory:  Negative for cough and shortness of breath.    Cardiovascular:  Negative for chest pain and palpitations.   Gastrointestinal:  Negative for abdominal pain, constipation, diarrhea, nausea and vomiting.   Genitourinary:  Negative for difficulty urinating, dysuria, frequency and pelvic pain.   Musculoskeletal:  Negative for arthralgias and myalgias.   Skin:  Negative for color change and rash.   Neurological:  Negative for weakness and headaches.   Psychiatric/Behavioral:  Positive for behavioral problems.          Objective:  Vitals:    01/24/24 1555   BP: 126/84   Pulse: 72   SpO2: 98%   Weight: (!) 137 kg (303 lb)   Height: 5' 2.5\" (1.588 m)     Body mass index is 54.54 kg/m².     Physical Exam  Constitutional:       General: She is not in acute distress.     Appearance: She is not ill-appearing.   HENT:      Head: Normocephalic and atraumatic.      Right Ear: External ear normal.      Left Ear: External ear normal.      Nose: Nose normal. No congestion or rhinorrhea.      Mouth/Throat:      Mouth: Mucous membranes are moist.      Pharynx: Oropharynx is clear. No oropharyngeal exudate or posterior oropharyngeal erythema.   Eyes:      Extraocular Movements: Extraocular movements intact.      Conjunctiva/sclera: Conjunctivae normal.      Pupils: Pupils are equal, round, and reactive to light.   Cardiovascular:      Rate and Rhythm: Normal rate and regular rhythm.      Pulses: Normal pulses.      Heart sounds: No murmur heard.  Pulmonary:      Effort: Pulmonary effort is normal. No respiratory distress.      Breath sounds: Normal breath sounds. No wheezing.   Chest:      Chest wall: No tenderness.   Abdominal:      General: Bowel sounds are normal.      Palpations: Abdomen is soft.      Tenderness: There is no abdominal tenderness.   Musculoskeletal:         General: Normal range of " motion.      Cervical back: Normal range of motion.   Skin:     General: Skin is warm and dry.      Capillary Refill: Capillary refill takes less than 2 seconds.      Findings: No rash.   Neurological:      General: No focal deficit present.      Mental Status: She is alert. Mental status is at baseline.

## 2024-02-05 ENCOUNTER — TELEPHONE (OUTPATIENT)
Dept: BARIATRICS | Facility: CLINIC | Age: 34
End: 2024-02-05

## 2024-02-05 NOTE — PATIENT INSTRUCTIONS
BARIATRIC SURGERY EDUCATION CHECKLIST    I have received education related to my bariatric surgery process and understand:    Patients may be required to complete a psychiatric evaluation and receive clearance for surgery from their psychiatrist.    Patients who undergo weight loss surgery are at higher risk of increased mental health concerns and suicide attempts.    Patients may be required to complete a full substance abuse evaluation and then complete all treatment recommendations prior to surgery.    If diagnosis of abuse/dependence results, patient may be required to remain sober for one (1) year before having bariatric surgery.    Patient’s on psychiatric medications should check with their provider to discuss psychiatric medications and the changes in absorption.  Patient should discuss all time release medications with provider and take all medications as prescribed.    The recommendation is that there is no use of  any tobacco products, Hookah or  vapes for the bariatric post-operation patient.    Bariatric surgery patients should not consume alcohol as a post-operative patient as it may increase risk of numerous health conditions including but not limited to alcohol abuse and ulcers.    There is a possibility of weight regain if patient does not follow all program guidelines and recommendations.    Bariatric surgery patients should exercise thirty (30) to sixty (60) minutes per day to maintain post-surgical weight loss.    Research indicates that bariatric patients are more successful when they see a therapist for up to two (2) years post-op.    Patients will follow all medical and dietary recommendations provided.    Patient will keep all scheduled appointments and follow up with their physician for a minimum of five (5) years.    Patient will take all vitamins as recommended.  Post-operative vitamins are life-long.    Patient reviewed Bariatric Surgery Education Checklist and agrees they have received  education on these issues.
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
## 2024-02-06 ENCOUNTER — CLINICAL SUPPORT (OUTPATIENT)
Dept: BARIATRICS | Facility: CLINIC | Age: 34
End: 2024-02-06

## 2024-02-06 VITALS — HEIGHT: 63 IN | WEIGHT: 293 LBS | BODY MASS INDEX: 51.91 KG/M2

## 2024-02-06 DIAGNOSIS — E66.01 MORBID (SEVERE) OBESITY DUE TO EXCESS CALORIES (HCC): Primary | ICD-10-CM

## 2024-02-06 DIAGNOSIS — Z71.89 ENCOUNTER FOR PRE-BARIATRIC SURGERY COUNSELING AND EDUCATION: Primary | ICD-10-CM

## 2024-02-06 PROCEDURE — RECHECK
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
## 2024-02-28 ENCOUNTER — CLINICAL SUPPORT (OUTPATIENT)
Dept: BARIATRICS | Facility: CLINIC | Age: 34
End: 2024-02-28

## 2024-02-28 VITALS — WEIGHT: 293 LBS | BODY MASS INDEX: 55.44 KG/M2

## 2024-02-28 DIAGNOSIS — E66.01 MORBID (SEVERE) OBESITY DUE TO EXCESS CALORIES (HCC): ICD-10-CM

## 2024-02-28 DIAGNOSIS — Z01.818 PREOP TESTING: Primary | ICD-10-CM

## 2024-02-28 PROCEDURE — RECHECK

## 2024-03-02 ENCOUNTER — APPOINTMENT (OUTPATIENT)
Dept: LAB | Facility: HOSPITAL | Age: 34
End: 2024-03-02
Payer: COMMERCIAL

## 2024-03-02 DIAGNOSIS — Z00.00 ROUTINE ADULT HEALTH MAINTENANCE: Primary | ICD-10-CM

## 2024-03-02 DIAGNOSIS — E66.01 MORBID (SEVERE) OBESITY DUE TO EXCESS CALORIES (HCC): ICD-10-CM

## 2024-03-02 DIAGNOSIS — Z01.818 PREOP TESTING: ICD-10-CM

## 2024-03-02 LAB
ALBUMIN SERPL BCP-MCNC: 4 G/DL (ref 3.5–5)
ALP SERPL-CCNC: 58 U/L (ref 34–104)
ALT SERPL W P-5'-P-CCNC: 15 U/L (ref 7–52)
ANION GAP SERPL CALCULATED.3IONS-SCNC: 5 MMOL/L
AST SERPL W P-5'-P-CCNC: 14 U/L (ref 13–39)
BASOPHILS # BLD AUTO: 0.07 THOUSANDS/ÂΜL (ref 0–0.1)
BASOPHILS NFR BLD AUTO: 1 % (ref 0–1)
BILIRUB SERPL-MCNC: 0.48 MG/DL (ref 0.2–1)
BUN SERPL-MCNC: 11 MG/DL (ref 5–25)
CALCIUM SERPL-MCNC: 9 MG/DL (ref 8.4–10.2)
CHLORIDE SERPL-SCNC: 107 MMOL/L (ref 96–108)
CHOLEST SERPL-MCNC: 181 MG/DL
CO2 SERPL-SCNC: 28 MMOL/L (ref 21–32)
CREAT SERPL-MCNC: 0.58 MG/DL (ref 0.6–1.3)
EOSINOPHIL # BLD AUTO: 0.59 THOUSAND/ÂΜL (ref 0–0.61)
EOSINOPHIL NFR BLD AUTO: 6 % (ref 0–6)
ERYTHROCYTE [DISTWIDTH] IN BLOOD BY AUTOMATED COUNT: 12.9 % (ref 11.6–15.1)
GFR SERPL CREATININE-BSD FRML MDRD: 118 ML/MIN/1.73SQ M
GLUCOSE P FAST SERPL-MCNC: 105 MG/DL (ref 65–99)
HCT VFR BLD AUTO: 43.6 % (ref 34.8–46.1)
HDLC SERPL-MCNC: 46 MG/DL
HGB BLD-MCNC: 14.5 G/DL (ref 11.5–15.4)
IMM GRANULOCYTES # BLD AUTO: 0.03 THOUSAND/UL (ref 0–0.2)
IMM GRANULOCYTES NFR BLD AUTO: 0 % (ref 0–2)
LDLC SERPL CALC-MCNC: 111 MG/DL (ref 0–100)
LYMPHOCYTES # BLD AUTO: 3.03 THOUSANDS/ÂΜL (ref 0.6–4.47)
LYMPHOCYTES NFR BLD AUTO: 33 % (ref 14–44)
MCH RBC QN AUTO: 28.4 PG (ref 26.8–34.3)
MCHC RBC AUTO-ENTMCNC: 33.3 G/DL (ref 31.4–37.4)
MCV RBC AUTO: 85 FL (ref 82–98)
MONOCYTES # BLD AUTO: 0.63 THOUSAND/ÂΜL (ref 0.17–1.22)
MONOCYTES NFR BLD AUTO: 7 % (ref 4–12)
NEUTROPHILS # BLD AUTO: 4.91 THOUSANDS/ÂΜL (ref 1.85–7.62)
NEUTS SEG NFR BLD AUTO: 53 % (ref 43–75)
NONHDLC SERPL-MCNC: 135 MG/DL
NRBC BLD AUTO-RTO: 0 /100 WBCS
PLATELET # BLD AUTO: 268 THOUSANDS/UL (ref 149–390)
PMV BLD AUTO: 9.8 FL (ref 8.9–12.7)
POTASSIUM SERPL-SCNC: 4.1 MMOL/L (ref 3.5–5.3)
PROT SERPL-MCNC: 7.3 G/DL (ref 6.4–8.4)
RBC # BLD AUTO: 5.11 MILLION/UL (ref 3.81–5.12)
SODIUM SERPL-SCNC: 140 MMOL/L (ref 135–147)
TRIGL SERPL-MCNC: 121 MG/DL
TSH SERPL DL<=0.05 MIU/L-ACNC: 1.51 UIU/ML (ref 0.45–4.5)
WBC # BLD AUTO: 9.26 THOUSAND/UL (ref 4.31–10.16)

## 2024-03-02 PROCEDURE — 80061 LIPID PANEL: CPT

## 2024-03-02 PROCEDURE — 85025 COMPLETE CBC W/AUTO DIFF WBC: CPT

## 2024-03-02 PROCEDURE — 36415 COLL VENOUS BLD VENIPUNCTURE: CPT

## 2024-03-02 PROCEDURE — 83036 HEMOGLOBIN GLYCOSYLATED A1C: CPT

## 2024-03-02 PROCEDURE — 84443 ASSAY THYROID STIM HORMONE: CPT

## 2024-03-02 PROCEDURE — 80053 COMPREHEN METABOLIC PANEL: CPT

## 2024-03-03 LAB
EST. AVERAGE GLUCOSE BLD GHB EST-MCNC: 117 MG/DL
HBA1C MFR BLD: 5.7 %

## 2024-03-05 ENCOUNTER — HOSPITAL ENCOUNTER (OUTPATIENT)
Dept: MRI IMAGING | Facility: HOSPITAL | Age: 34
Discharge: HOME | End: 2024-03-05

## 2024-03-05 DIAGNOSIS — H47.333 PSEUDOPAPILLEDEMA OF BOTH OPTIC DISCS: ICD-10-CM

## 2024-03-12 ENCOUNTER — TELEPHONE (OUTPATIENT)
Age: 34
End: 2024-03-12

## 2024-03-28 ENCOUNTER — CLINICAL SUPPORT (OUTPATIENT)
Dept: BARIATRICS | Facility: CLINIC | Age: 34
End: 2024-03-28

## 2024-03-28 VITALS — WEIGHT: 293 LBS | BODY MASS INDEX: 54.54 KG/M2

## 2024-03-28 DIAGNOSIS — Z71.89 ENCOUNTER FOR PRE-BARIATRIC SURGERY COUNSELING AND EDUCATION: Primary | ICD-10-CM

## 2024-03-28 PROCEDURE — RECHECK

## 2024-03-28 NOTE — PROGRESS NOTES
3/6 weight check. Patient saw her neurologist 3/12 and was ordered an MRI and LP. Having MRI 3/30 and LP on 4/8. Being worked up for idiopathic intracranial hypertension. Has been implementing a lot of self care/positive coping skills due to the heavy load emotionally. Stopped drinking high calorie coffees. Has been prepping meals ahead of time. Eating 3 meals per day. Bought a cirkle water bottle- 20 oz bottle and drinking about 6-7 bottles, herbal tea. Has been very active- started a yoga class that she is going to do weekly. Also has been walking more and doing yard work.  Workflow reviewed:   Psych and/or D+A Clearance: N/A  PCP Letter: Referral in the chart  Support Group: No longer required, strongly encouraged  Surgeon Appt:12/28/23  EGD: Completed 5/18/23 with colonoscopy  Cardiac Risk Assessment: Scheduled 4/2/24  Sleep Studies: N/A (SB 3/8)  Blood work: Completed 3/2/24  Nicotine test: N/A  Weight loss medications: N/A  Required weight checks: 6 months required  Weight Loss: Not required, encouraged positive lifestyle changes.  Kady Roa LCSW

## 2024-03-30 DIAGNOSIS — E11.9 TYPE 2 DIABETES MELLITUS WITHOUT COMPLICATION, WITHOUT LONG-TERM CURRENT USE OF INSULIN (HCC): ICD-10-CM

## 2024-03-30 DIAGNOSIS — R05.9 COUGH: ICD-10-CM

## 2024-03-30 DIAGNOSIS — F33.41 RECURRENT MAJOR DEPRESSIVE DISORDER, IN PARTIAL REMISSION (HCC): ICD-10-CM

## 2024-04-01 RX ORDER — ALBUTEROL SULFATE 2.5 MG/3ML
SOLUTION RESPIRATORY (INHALATION)
Qty: 75 ML | Refills: 0 | Status: SHIPPED | OUTPATIENT
Start: 2024-04-01

## 2024-04-01 RX ORDER — SERTRALINE HYDROCHLORIDE 100 MG/1
TABLET, FILM COATED ORAL
Qty: 135 TABLET | Refills: 0 | Status: SHIPPED | OUTPATIENT
Start: 2024-04-01

## 2024-04-01 RX ORDER — ONDANSETRON 4 MG/1
TABLET, FILM COATED ORAL
Qty: 20 TABLET | Refills: 0 | Status: SHIPPED | OUTPATIENT
Start: 2024-04-01

## 2024-04-02 ENCOUNTER — CONSULT (OUTPATIENT)
Dept: CARDIOLOGY CLINIC | Facility: CLINIC | Age: 34
End: 2024-04-02
Payer: COMMERCIAL

## 2024-04-02 VITALS
OXYGEN SATURATION: 98 % | BODY MASS INDEX: 51.91 KG/M2 | DIASTOLIC BLOOD PRESSURE: 78 MMHG | RESPIRATION RATE: 16 BRPM | SYSTOLIC BLOOD PRESSURE: 118 MMHG | HEIGHT: 63 IN | WEIGHT: 293 LBS | HEART RATE: 60 BPM

## 2024-04-02 DIAGNOSIS — R01.0 INNOCENT HEART MURMUR: ICD-10-CM

## 2024-04-02 DIAGNOSIS — Z98.84 BARIATRIC SURGERY STATUS: ICD-10-CM

## 2024-04-02 DIAGNOSIS — R01.1 MURMUR: Primary | ICD-10-CM

## 2024-04-02 PROCEDURE — 99204 OFFICE O/P NEW MOD 45 MIN: CPT | Performed by: INTERNAL MEDICINE

## 2024-04-02 NOTE — PROGRESS NOTES
"                                           Cardiology Consultation     Kiana Kapoor  [id number]  1990  [address] [facility] [address] [place] [address]    HPI:  34-year-old lady planned bariatric surgery.  She does have diabetes type 2 since 2022.    She has no history of cardiac disease.  She does not smoke.  She is physically active without symptoms of shortness of breath or chest discomfort.    LDL cholesterol is 111.    She has had GERD and she is planning bariatric surgery.  She is here to be \"cleared\".    1. Murmur  -     Echo complete w/ contrast if indicated; Future; Expected date: 04/02/2024    2. Bariatric surgery status  -     Ambulatory referral to Cardiology      Patient Active Problem List   Diagnosis    Recurrent major depressive disorder, in partial remission ([facility])    Type 2 diabetes mellitus without complication, without long-term current use of insulin ([facility])    PCOS (polycystic ovarian syndrome)    Morbid (severe) obesity due to excess calories ([facility])    Class 3 severe obesity due to excess calories with serious comorbidity and body mass index (BMI) of 50.0 to 59.9 in adult ([facility])    Bariatric surgery status     Past Medical History:   Diagnosis Date    Asthma     Asthma 02/01/2022    Gets bronchitis with colds. Uses Albuterol for colds.     Depression     Well controlled with Zoloft    Diabetes mellitus ([facility])     Encounter for gynecological examination without abnormal finding 11/29/2021    Hidradenitis suppurativa 03/25/2022    Irritable bowel syndrome     Morbid obesity with body mass index (BMI) of 60.0 to 69.9 in adult ([facility])     Multiple gastric ulcers     Pre-conception counseling 01/31/2022    Getting  August 2022, desires pregnancy Hx PCOS, ovarian drilling Weight reduction advised by THANIA Quintero -Check A1C Taking multivitamin (confirm folate). S/p COVID vax (no booster yet, due March 2022)     Social History "     Socioeconomic History    Marital status: /Civil Union     Spouse name: Not on file    Number of children: Not on file    Years of education: Not on file    Highest education level: Not on file   Occupational History    Not on file   Tobacco Use    Smoking status: Never    Smokeless tobacco: Never   Vaping Use    Vaping status: Former   Substance and Sexual Activity    Alcohol use: Not Currently    Drug use: Yes     Types: Marijuana    Sexual activity: Yes     Partners: Male     Birth control/protection: None   Other Topics Concern    Not on file   Social History Narrative    Not on file     Social Determinants of Health     Financial Resource Strain: Not on file   Food Insecurity: Not on file   Transportation Needs: Not on file   Physical Activity: Not on file   Stress: Not on file   Social Connections: Not on file   Intimate Partner Violence: Not on file   Housing Stability: Not on file      Family History   Problem Relation Age of Onset    No Known Problems Sister     No Known Problems Sister     No Known Problems Brother     No Known Problems Brother     Diabetes Maternal Grandfather      Past Surgical History:   Procedure Laterality Date    DILATION AND CURETTAGE OF UTERUS      endometrial thickening    EGD AND COLONOSCOPY  2013    OVARY SURGERY      ovarian drilling        Current Outpatient Medications:     albuterol (2.5 mg/3 mL) 0.083 % nebulizer solution, USE 2 VIALS (6ML) VIA NEBULIZER EVERY 6 HOURS AS NEEDED FOR WHEEZING, Disp: 75 mL, Rfl: 0    albuterol (PROVENTIL HFA,VENTOLIN HFA) 90 mcg/act inhaler, Inhale 2 puffs every 4 (four) hours as needed for wheezing, Disp: 6.7 g, Rfl: 5    Biotin w/ Vitamins C & E (HAIR SKIN & NAILS GUMMIES PO), , Disp: , Rfl:     metFORMIN (GLUCOPHAGE-XR) 500 mg 24 hr tablet, Take 1 tablet (500 mg total) by mouth daily with dinner, Disp: 90 tablet, Rfl: 1    Multiple Vitamin (multivitamin) tablet, Take 1 tablet by mouth daily, Disp: , Rfl:     ondansetron (ZOFRAN) 4  "mg tablet, TAKE ONE TABLET BY MOUTH EVERY 8 HOURS AS NEEDED FOR NAUSEA AND VOMITING, Disp: 20 tablet, Rfl: 0    pantoprazole (PROTONIX) 40 mg tablet, TAKE ONE TABLET BY MOUTH EVERY DAY, Disp: 30 tablet, Rfl: 5    sertraline (ZOLOFT) 100 mg tablet, TAKE ONE AND ONE-HALF TABLETS BY MOUTH EVERY DAY, Disp: 135 tablet, Rfl: 0    triamcinolone (KENALOG) 0.1 % ointment, Apply topically 2 (two) times a day, Disp: 30 g, Rfl: 0  Allergies   Allergen Reactions    Iodine - Food Allergy GI Intolerance    Mite (D. Farinae) Eye Swelling and Itching    Red Dye - Food Allergy Rash     Vitals:    04/02/24 1512   BP: 118/78   BP Location: Left arm   Patient Position: Sitting   Cuff Size: Large   Pulse: 60   Resp: 16   SpO2: 98%   Weight: (!) 138 kg (305 lb)   Height: 5' 2.5\" (1.588 m)       Labs:  Appointment on 03/02/2024   Component Date Value    Sodium 03/02/2024 140     Potassium 03/02/2024 4.1     Chloride 03/02/2024 107     CO2 03/02/2024 28     ANION GAP 03/02/2024 5     BUN 03/02/2024 11     Creatinine 03/02/2024 0.58 (L)     Glucose, Fasting 03/02/2024 105 (H)     Calcium 03/02/2024 9.0     AST 03/02/2024 14     ALT 03/02/2024 15     Alkaline Phosphatase 03/02/2024 58     Total Protein 03/02/2024 7.3     Albumin 03/02/2024 4.0     Total Bilirubin 03/02/2024 0.48     eGFR 03/02/2024 118     WBC 03/02/2024 9.26     RBC 03/02/2024 5.11     Hemoglobin 03/02/2024 14.5     Hematocrit 03/02/2024 43.6     MCV 03/02/2024 85     MCH 03/02/2024 28.4     MCHC 03/02/2024 33.3     RDW 03/02/2024 12.9     MPV 03/02/2024 9.8     Platelets 03/02/2024 268     nRBC 03/02/2024 0     Neutrophils Relative 03/02/2024 53     Immature Grans % 03/02/2024 0     Lymphocytes Relative 03/02/2024 33     Monocytes Relative 03/02/2024 7     Eosinophils Relative 03/02/2024 6     Basophils Relative 03/02/2024 1     Neutrophils Absolute 03/02/2024 4.91     Absolute Immature Grans 03/02/2024 0.03     Absolute Lymphocytes 03/02/2024 3.03     Absolute Monocytes " 2024 0.63     Eosinophils Absolute 2024 0.59     Basophils Absolute 2024 0.07     Hemoglobin A1C 2024 5.7 (H)     EAG 2024 117     Cholesterol 2024 181     Triglycerides 2024 121     HDL, Direct 2024 46 (L)     LDL Calculated 2024 111 (H)     Non-HDL-Chol (CHOL-HDL) 2024 135     TSH 3RD GENERATON 2024 1.509    Office Visit on 2024   Component Date Value    Hemoglobin A1C 2024 5.4    Appointment on 2023   Component Date Value    Sodium 2023 142     Potassium 2023 3.9     Chloride 2023 105     CO2 2023 28     ANION GAP 2023 9     BUN 2023 9     Creatinine 2023 0.63     Glucose, Fasting 2023 92     Calcium 2023 9.3     AST 2023 18     ALT 2023 14     Alkaline Phosphatase 2023 61     Total Protein 2023 7.3     Albumin 2023 4.1     Total Bilirubin 2023 0.29     eGFR 2023 116     TSH 3RD GENERATON 2023 2.242     Cholesterol 2023 200 (H)     Triglycerides 2023 135     HDL, Direct 2023 47 (L)     LDL Calculated 2023 126 (H)    Office Visit on 10/17/2023   Component Date Value    Severity 10/17/2023 NORMAL     Right Eye Diabetic Retin* 10/17/2023 None     Right Eye Macular Edema 10/17/2023 None     Right Eye Other Retinopa* 10/17/2023 None     Right Eye Image Quality 10/17/2023 Gradable Image     Left Eye Diabetic Retino* 10/17/2023 None     Left Eye Macular Edema 10/17/2023 None     Left Eye Other Retinopat* 10/17/2023 None     Left Eye Image Quality 10/17/2023 Gradable Image     Result 10/17/2023 Retinal Study Result for JOB PROCTOR     Result 10/17/2023       Result 10/17/2023  JOB PROCTOR, a 34 y/o, F (: 1990, MRN: [id number])     Result 10/17/2023  [facility] retinal imaging study of the left and right eyes.     Result 10/17/2023       Result  "10/17/2023  Based on the findings of the study, the following is recommended for JOB PROCTOR     Result 10/17/2023  Normal Study: Re-scan the patient in 12 months or in the next calendar year.     Result 10/17/2023       Result 10/17/2023  Interpreting Provider's Comments:  No comments provided     Result 10/17/2023  Diagnoses Present: E119 - Type 2 diabetes mellitus without complications     Result 10/17/2023       Result 10/17/2023  Right eye findings: Negative for Diabetic Retinopathy     Result 10/17/2023 Negative for Macular Edema     Result 10/17/2023       Result 10/17/2023  Left eye findings: Negative for Diabetic Retinopathy     Result 10/17/2023 Negative for Macular Edema     Result 10/17/2023       Result 10/17/2023  This result was electronically signed by Benny Zhao MD, NPI: [id number], Taxonomy: [id number] on 10- 12:58 UTC.     Result 10/17/2023 NOTE:  Any pathology noted on this diabetic retinal evaluation should be confirmed by an appropriate ophthalmic examination.     Hemoglobin A1C 10/17/2023 5.2     Creatinine, Ur 10/17/2023 145.8     Albumin,U,Random 10/17/2023 56.7 (H)     Albumin Creat Ratio 10/17/2023 39 (H)      Imaging: No results found.    Review of Systems:    Previous ovarian surgery.  Previous D&C.  Sees a neurologist for idiopathic intracerebral hypertension.    Physical Exam:      Morbidly obese.  Blood pressure 118/78.  Heart rate 60 and regular.  Skin warm and dry.  Pupils equal.  Carotids 2+ without bruits.  Lungs clear.  Rhythm regular.  Grade 1/6 systolic ejection murmur at the left base.  Regular rhythm.  No organomegaly.  No calf tenderness.  No edema.  Good strength in all extremities.    Previous EKG done is normal.        Discussion/Summary:    1.  Innocent heart murmur    Recommendations:    1.  She is \"cleared\" for gastric bypass surgery  2.  We will get an echo for reassurance but the surgery should not be held up pending echo results which I expect to be " normal.            Samuel Tsang MD

## 2024-04-11 ENCOUNTER — HOSPITAL ENCOUNTER (OUTPATIENT)
Dept: NON INVASIVE DIAGNOSTICS | Facility: CLINIC | Age: 34
Discharge: HOME | End: 2024-04-11
Payer: COMMERCIAL

## 2024-04-11 VITALS
DIASTOLIC BLOOD PRESSURE: 78 MMHG | WEIGHT: 293 LBS | HEIGHT: 63 IN | SYSTOLIC BLOOD PRESSURE: 118 MMHG | BODY MASS INDEX: 51.91 KG/M2 | HEART RATE: 68 BPM

## 2024-04-11 DIAGNOSIS — R01.1 MURMUR: ICD-10-CM

## 2024-04-11 LAB
AORTIC ROOT: 2.9 CM
APICAL FOUR CHAMBER EJECTION FRACTION: 69 %
AV LVOT PEAK GRADIENT: 6 MMHG
AV PEAK GRADIENT: 7 MMHG
BSA FOR ECHO PROCEDURE: 2.3 M2
E WAVE DECELERATION TIME: 216 MS
E/A RATIO: 1.1
FRACTIONAL SHORTENING: 39 (ref 28–44)
INTERVENTRICULAR SEPTUM IN DIASTOLE (PARASTERNAL SHORT AXIS VIEW): 0.8 CM
INTERVENTRICULAR SEPTUM: 0.8 CM (ref 0.6–1.1)
LAAS-AP2: 10.1 CM2
LAAS-AP4: 15.4 CM2
LEFT ATRIUM AREA SYSTOLE SINGLE PLANE A4C: 14.8 CM2
LEFT ATRIUM SIZE: 3.7 CM
LEFT ATRIUM VOLUME (MOD BIPLANE): 32 ML
LEFT ATRIUM VOLUME INDEX (MOD BIPLANE): 13.9 ML/M2
LEFT INTERNAL DIMENSION IN SYSTOLE: 2.7 CM (ref 2.1–4)
LEFT VENTRICULAR INTERNAL DIMENSION IN DIASTOLE: 4.4 CM (ref 3.5–6)
LEFT VENTRICULAR POSTERIOR WALL IN END DIASTOLE: 0.9 CM
LEFT VENTRICULAR STROKE VOLUME: 58 ML
LVSV (TEICH): 58 ML
MV E'TISSUE VEL-SEP: 13 CM/S
MV PEAK A VEL: 0.69 M/S
MV PEAK E VEL: 76 CM/S
MV STENOSIS PRESSURE HALF TIME: 63 MS
MV VALVE AREA P 1/2 METHOD: 3.49
RIGHT ATRIUM AREA SYSTOLE A4C: 11.3 CM2
RIGHT VENTRICLE ID DIMENSION: 3 CM
SL CV LEFT ATRIUM LENGTH A2C: 4 CM
SL CV LV EF: 60
SL CV PED ECHO LEFT VENTRICLE DIASTOLIC VOLUME (MOD BIPLANE) 2D: 86 ML
SL CV PED ECHO LEFT VENTRICLE SYSTOLIC VOLUME (MOD BIPLANE) 2D: 28 ML
TR MAX PG: 22 MMHG
TR PEAK VELOCITY: 2.3 M/S
TRICUSPID ANNULAR PLANE SYSTOLIC EXCURSION: 2 CM
TRICUSPID VALVE PEAK REGURGITATION VELOCITY: 2.33 M/S

## 2024-04-11 PROCEDURE — 93306 TTE W/DOPPLER COMPLETE: CPT

## 2024-04-11 PROCEDURE — 93306 TTE W/DOPPLER COMPLETE: CPT | Performed by: INTERNAL MEDICINE

## 2024-04-24 ENCOUNTER — CLINICAL SUPPORT (OUTPATIENT)
Dept: BARIATRICS | Facility: CLINIC | Age: 34
End: 2024-04-24

## 2024-04-24 VITALS — WEIGHT: 293 LBS | BODY MASS INDEX: 55.44 KG/M2

## 2024-04-24 DIAGNOSIS — E66.01 MORBID (SEVERE) OBESITY DUE TO EXCESS CALORIES (HCC): Primary | ICD-10-CM

## 2024-04-24 PROCEDURE — RECHECK
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
## 2024-05-24 NOTE — PROGRESS NOTES
5/6 weight check. Has been very hectic- moved her MIL to [place]. Has now been working on their house since it is officially theirs. Took time off work. Has been practicing 30/60. Has been walking more. Has been having protein shakes as meal replacement as things has been hectic to help her stay on track. Drinking 40-80 oz of water, herbal tea.   Workflow reviewed:   Psych and/or D+A Clearance: N/A  PCP Letter: Referral in the chart  Support Group: No longer required, strongly encouraged  Surgeon Appt:12/28/23  EGD: Completed 5/18/23 with colonoscopy  Cardiac Risk Assessment: completed 4/2/24, ordered echo- completed 4/11  Sleep Studies: N/A (SB 3/8)  Blood work: Completed 3/2/24  Nicotine test: N/A  Weight loss medications: N/A  Required weight checks: 6 months required  Weight Loss: Not required, encouraged positive lifestyle changes.  Kady Roa LCSW

## 2024-05-28 ENCOUNTER — CLINICAL SUPPORT (OUTPATIENT)
Dept: BARIATRICS | Facility: CLINIC | Age: 34
End: 2024-05-28

## 2024-05-28 VITALS — BODY MASS INDEX: 55.8 KG/M2 | WEIGHT: 293 LBS

## 2024-05-28 DIAGNOSIS — Z71.89 ENCOUNTER FOR PRE-BARIATRIC SURGERY COUNSELING AND EDUCATION: Primary | ICD-10-CM

## 2024-05-28 PROCEDURE — RECHECK

## 2024-06-11 ENCOUNTER — CLINICAL SUPPORT (OUTPATIENT)
Dept: BARIATRICS | Facility: CLINIC | Age: 34
End: 2024-06-11

## 2024-06-11 VITALS — BODY MASS INDEX: 51.91 KG/M2 | WEIGHT: 293 LBS | HEIGHT: 63 IN

## 2024-06-11 DIAGNOSIS — E66.01 MORBID (SEVERE) OBESITY DUE TO EXCESS CALORIES (HCC): Primary | ICD-10-CM

## 2024-06-11 PROCEDURE — RECHECK
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
## 2024-06-24 DIAGNOSIS — F33.41 RECURRENT MAJOR DEPRESSIVE DISORDER, IN PARTIAL REMISSION (HCC): ICD-10-CM

## 2024-06-24 RX ORDER — SERTRALINE HYDROCHLORIDE 100 MG/1
TABLET, FILM COATED ORAL
Qty: 135 TABLET | Refills: 1 | Status: SHIPPED | OUTPATIENT
Start: 2024-06-24

## 2024-07-11 ENCOUNTER — CLINICAL SUPPORT (OUTPATIENT)
Dept: BARIATRICS | Facility: CLINIC | Age: 34
End: 2024-07-11

## 2024-07-11 DIAGNOSIS — E66.01 MORBID (SEVERE) OBESITY DUE TO EXCESS CALORIES (HCC): Primary | ICD-10-CM

## 2024-07-11 PROCEDURE — RECHECK

## 2024-07-11 NOTE — PROGRESS NOTES
Weight Management Nutrition Education  [phone]  Phone visit:   Name was verified by patient stating name? yes   verified by patient stating? yes  You verified the patient is alone? yes  I would like to verify that you were offered a live visit but are now consenting to this telephone visit? yes  This visit is free yes      Diagnosis: Obesity    Bariatric Surgeon: Dr. Parsons      Surgery: Gastric Bypass Laparoscopic Scheduled for 2024    Called pt to review preop diet regimen. Pt was given handouts at last RD visit in anticipation that pt will follow diet 4 weeks prier to surgery  due to higher BMI over 55. Pt was advised pt that after 2 weeks of the modified diet which she started on 2024  and then transition the full liver shrinking diet 2 weeks before the sx that will put her into ketosis (<50g carbs) in order to help shrink the liver and promote good weight loss. Pt will start the 2nd part on 2024.    Pt verbalizes that she has understanding of the diet regimens and will contact if further questions arise. Pt will be attending PreOp class on 2024.

## 2024-07-12 ENCOUNTER — PREP FOR PROCEDURE (OUTPATIENT)
Dept: BARIATRICS | Facility: CLINIC | Age: 34
End: 2024-07-12

## 2024-07-12 ENCOUNTER — OFFICE VISIT (OUTPATIENT)
Dept: BARIATRICS | Facility: CLINIC | Age: 34
End: 2024-07-12
Payer: COMMERCIAL

## 2024-07-12 ENCOUNTER — TELEPHONE (OUTPATIENT)
Age: 34
End: 2024-07-12

## 2024-07-12 VITALS
BODY MASS INDEX: 51.91 KG/M2 | HEART RATE: 65 BPM | WEIGHT: 293 LBS | RESPIRATION RATE: 12 BRPM | SYSTOLIC BLOOD PRESSURE: 130 MMHG | HEIGHT: 63 IN | DIASTOLIC BLOOD PRESSURE: 80 MMHG

## 2024-07-12 DIAGNOSIS — E11.9 TYPE 2 DIABETES MELLITUS WITHOUT COMPLICATION, WITHOUT LONG-TERM CURRENT USE OF INSULIN (HCC): ICD-10-CM

## 2024-07-12 DIAGNOSIS — E28.2 POLYCYSTIC OVARIES: ICD-10-CM

## 2024-07-12 DIAGNOSIS — F33.41 RECURRENT MAJOR DEPRESSION IN PARTIAL REMISSION (HCC): ICD-10-CM

## 2024-07-12 DIAGNOSIS — E66.01 MORBID OBESITY (HCC): Primary | ICD-10-CM

## 2024-07-12 DIAGNOSIS — Z01.818 ENCOUNTER FOR OTHER PREPROCEDURAL EXAMINATION: Primary | ICD-10-CM

## 2024-07-12 DIAGNOSIS — E28.2 PCOS (POLYCYSTIC OVARIAN SYNDROME): ICD-10-CM

## 2024-07-12 DIAGNOSIS — E66.01 MORBID (SEVERE) OBESITY DUE TO EXCESS CALORIES (HCC): ICD-10-CM

## 2024-07-12 DIAGNOSIS — E11.9 DIABETES MELLITUS (HCC): ICD-10-CM

## 2024-07-12 DIAGNOSIS — F33.41 RECURRENT MAJOR DEPRESSIVE DISORDER, IN PARTIAL REMISSION (HCC): ICD-10-CM

## 2024-07-12 DIAGNOSIS — E66.01 CLASS 3 SEVERE OBESITY DUE TO EXCESS CALORIES WITH SERIOUS COMORBIDITY AND BODY MASS INDEX (BMI) OF 50.0 TO 59.9 IN ADULT (HCC): ICD-10-CM

## 2024-07-12 PROCEDURE — 99213 OFFICE O/P EST LOW 20 MIN: CPT | Performed by: SURGERY

## 2024-07-12 RX ORDER — OMEPRAZOLE 20 MG/1
20 CAPSULE, DELAYED RELEASE ORAL DAILY
Qty: 90 CAPSULE | Refills: 1 | Status: SHIPPED | OUTPATIENT
Start: 2024-07-12

## 2024-07-12 RX ORDER — ENOXAPARIN SODIUM 300 MG/3ML
40 INJECTION INTRAVENOUS; SUBCUTANEOUS ONCE
OUTPATIENT
Start: 2024-07-12 | End: 2024-07-12

## 2024-07-12 RX ORDER — ACETAMINOPHEN 10 MG/ML
1000 INJECTION, SOLUTION INTRAVENOUS ONCE
OUTPATIENT
Start: 2024-07-12 | End: 2024-07-12

## 2024-07-12 RX ORDER — CELECOXIB 200 MG/1
200 CAPSULE ORAL ONCE
OUTPATIENT
Start: 2024-07-12 | End: 2024-07-12

## 2024-07-12 NOTE — PROGRESS NOTES
H&P Exam - Bariatric Surgery   Kiana Kapoor 34 y.o. female MRN: [id number]  Unit/Bed#:  Encounter: [id number]      HPI:    34 y.o. yo female with morbid obesity found to be a good candidate to undergo a weight loss operation upon being enrolled here at the [facility].  She has been pre certified to undergo a Laparoscopic sharona-en-y gastric bypass.  Here today to review her pre op test results.    Has been medically cleared for the procedure.    EGD IMPRESSION:  1.  Normal esophagogastroduodenoscopy       Chronic pain requiring opioids - (No)    I have discussed with her at length the risks and benefits of the operation and reiterated the components of our multidisciplinary program and the importance of compliance and follow up in the post operative period. Although there is a great statistical chance of improvement or even resolution of most of her associated comorbidities, the results vary from patient to patient and they largely depend on his commitment.     The patient was also instructed with regards to the importance of behavior modification, nutritional counseling, support meeting attendance and lifestyle changes that are important to ensure success.    She was given the opportunity to ask questions and I have answered all of them.    I have addressed with the patient the level of CODE STATUS for this hospital stay and after explaining the different options currently she wishes to be a Level I.   She understands and wishes to proceed.      Review of Systems   Gastrointestinal:         GERD   All other systems reviewed and are negative.      Historical Information   Past Medical History:   Diagnosis Date    Asthma     Asthma 02/01/2022    Gets bronchitis with colds. Uses Albuterol for colds.     Depression     Well controlled with Zoloft    Diabetes mellitus (HCC)     Encounter for gynecological examination without abnormal finding 11/29/2021    Hidradenitis suppurativa 03/25/2022     "Irritable bowel syndrome     Morbid obesity with body mass index (BMI) of 60.0 to 69.9 in adult (HCC)     Multiple gastric ulcers     Pre-conception counseling 01/31/2022    Getting  August 2022, desires pregnancy Hx PCOS, ovarian drilling Weight reduction advised by THANIA Quintero -Check A1C Taking multivitamin (confirm folate). S/p COVID vax (no booster yet, due March 2022)     Past Surgical History:   Procedure Laterality Date    DILATION AND CURETTAGE OF UTERUS      endometrial thickening    EGD AND COLONOSCOPY  2013    OVARY SURGERY      ovarian drilling      Social History   Social History     Substance and Sexual Activity   Alcohol Use Not Currently     Social History     Substance and Sexual Activity   Drug Use Yes    Types: Marijuana     Social History     Tobacco Use   Smoking Status Never   Smokeless Tobacco Never     Family History: non-contributory    Meds/Allergies   all medications and allergies reviewed  Allergies   Allergen Reactions    Iodine - Food Allergy GI Intolerance    Mite (D. Farinae) Eye Swelling and Itching    Red Dye - Food Allergy Rash       Objective     Current Vitals:   Blood Pressure: 130/80 (07/12/24 1355)  Pulse: 65 (07/12/24 1355)  Respirations: 12 (07/12/24 1355)  Height: 5' 2.5\" (158.8 cm) (07/12/24 1355)  Weight - Scale: (!) 138 kg (304 lb) (07/12/24 1355)      Invasive Devices       None                   Physical Exam  Constitutional:       Appearance: Normal appearance.   HENT:      Head: Atraumatic.      Nose: No rhinorrhea.   Eyes:      Extraocular Movements: Extraocular movements intact.   Cardiovascular:      Rate and Rhythm: Normal rate.   Pulmonary:      Effort: Pulmonary effort is normal. No respiratory distress.      Breath sounds: Normal breath sounds.   Abdominal:      General: Abdomen is flat. There is no distension.      Palpations: Abdomen is soft.      Tenderness: There is no abdominal tenderness.   Musculoskeletal:         General: Normal range of " motion.      Cervical back: Normal range of motion.   Skin:     General: Skin is warm and dry.   Neurological:      General: No focal deficit present.      Mental Status: She is alert and oriented to person, place, and time.   Psychiatric:         Mood and Affect: Mood normal.         Behavior: Behavior normal.         Lab Results: I have personally reviewed pertinent lab results.    Imaging: I have personally reviewed pertinent reports.    EKG, Pathology, and Other Studies: I have personally reviewed pertinent reports.      Code Status: Level 1    Assessment:  34 y.o. yo female with morbid obesity found to be a good candidate to undergo a weight loss operation upon being enrolled here at the [facility]. She has completed all of the preoperative process for bariatric surgery.    Plan:  - Plan for laparoscopic possible open sharona-en-y gastric bypass with intraoperative EGD  - consent signed  - preop orders placed

## 2024-07-12 NOTE — TELEPHONE ENCOUNTER
Patient returned call from office. Warm transferred to [facility] for further assistance.

## 2024-07-12 NOTE — H&P (VIEW-ONLY)
H&P Exam - Bariatric Surgery   Kiana Kapoor 34 y.o. female MRN: [id number]  Unit/Bed#:  Encounter: [id number]      HPI:    34 y.o. yo female with morbid obesity found to be a good candidate to undergo a weight loss operation upon being enrolled here at the [facility].  She has been pre certified to undergo a Laparoscopic sharona-en-y gastric bypass.  Here today to review her pre op test results.    Has been medically cleared for the procedure.    EGD IMPRESSION:  1.  Normal esophagogastroduodenoscopy       Chronic pain requiring opioids - (No)    I have discussed with her at length the risks and benefits of the operation and reiterated the components of our multidisciplinary program and the importance of compliance and follow up in the post operative period. Although there is a great statistical chance of improvement or even resolution of most of her associated comorbidities, the results vary from patient to patient and they largely depend on his commitment.     The patient was also instructed with regards to the importance of behavior modification, nutritional counseling, support meeting attendance and lifestyle changes that are important to ensure success.    She was given the opportunity to ask questions and I have answered all of them.    I have addressed with the patient the level of CODE STATUS for this hospital stay and after explaining the different options currently she wishes to be a Level I.   She understands and wishes to proceed.      Review of Systems   Gastrointestinal:         GERD   All other systems reviewed and are negative.      Historical Information   Past Medical History:   Diagnosis Date    Asthma     Asthma 02/01/2022    Gets bronchitis with colds. Uses Albuterol for colds.     Depression     Well controlled with Zoloft    Diabetes mellitus (HCC)     Encounter for gynecological examination without abnormal finding 11/29/2021    Hidradenitis suppurativa 03/25/2022     "Irritable bowel syndrome     Morbid obesity with body mass index (BMI) of 60.0 to 69.9 in adult (HCC)     Multiple gastric ulcers     Pre-conception counseling 01/31/2022    Getting  August 2022, desires pregnancy Hx PCOS, ovarian drilling Weight reduction advised by THANIA Quintero -Check A1C Taking multivitamin (confirm folate). S/p COVID vax (no booster yet, due March 2022)     Past Surgical History:   Procedure Laterality Date    DILATION AND CURETTAGE OF UTERUS      endometrial thickening    EGD AND COLONOSCOPY  2013    OVARY SURGERY      ovarian drilling      Social History   Social History     Substance and Sexual Activity   Alcohol Use Not Currently     Social History     Substance and Sexual Activity   Drug Use Yes    Types: Marijuana     Social History     Tobacco Use   Smoking Status Never   Smokeless Tobacco Never     Family History: non-contributory    Meds/Allergies   all medications and allergies reviewed  Allergies   Allergen Reactions    Iodine - Food Allergy GI Intolerance    Mite (D. Farinae) Eye Swelling and Itching    Red Dye - Food Allergy Rash       Objective     Current Vitals:   Blood Pressure: 130/80 (07/12/24 1355)  Pulse: 65 (07/12/24 1355)  Respirations: 12 (07/12/24 1355)  Height: 5' 2.5\" (158.8 cm) (07/12/24 1355)  Weight - Scale: (!) 138 kg (304 lb) (07/12/24 1355)      Invasive Devices       None                   Physical Exam  Constitutional:       Appearance: Normal appearance.   HENT:      Head: Atraumatic.      Nose: No rhinorrhea.   Eyes:      Extraocular Movements: Extraocular movements intact.   Cardiovascular:      Rate and Rhythm: Normal rate.   Pulmonary:      Effort: Pulmonary effort is normal. No respiratory distress.      Breath sounds: Normal breath sounds.   Abdominal:      General: Abdomen is flat. There is no distension.      Palpations: Abdomen is soft.      Tenderness: There is no abdominal tenderness.   Musculoskeletal:         General: Normal range of " motion.      Cervical back: Normal range of motion.   Skin:     General: Skin is warm and dry.   Neurological:      General: No focal deficit present.      Mental Status: She is alert and oriented to person, place, and time.   Psychiatric:         Mood and Affect: Mood normal.         Behavior: Behavior normal.         Lab Results: I have personally reviewed pertinent lab results.    Imaging: I have personally reviewed pertinent reports.    EKG, Pathology, and Other Studies: I have personally reviewed pertinent reports.      Code Status: Level 1    Assessment:  34 y.o. yo female with morbid obesity found to be a good candidate to undergo a weight loss operation upon being enrolled here at the [facility]. She has completed all of the preoperative process for bariatric surgery.    Plan:  - Plan for laparoscopic possible open sharona-en-y gastric bypass with intraoperative EGD  - consent signed  - preop orders placed

## 2024-07-22 DIAGNOSIS — E11.9 TYPE 2 DIABETES MELLITUS WITHOUT COMPLICATION, WITHOUT LONG-TERM CURRENT USE OF INSULIN (HCC): ICD-10-CM

## 2024-07-22 RX ORDER — METFORMIN HYDROCHLORIDE 500 MG/1
500 TABLET, EXTENDED RELEASE ORAL
Qty: 90 TABLET | Refills: 1 | Status: SHIPPED | OUTPATIENT
Start: 2024-07-22

## 2024-07-25 ENCOUNTER — CLINICAL SUPPORT (OUTPATIENT)
Dept: BARIATRICS | Facility: CLINIC | Age: 34
End: 2024-07-25

## 2024-07-25 DIAGNOSIS — E66.01 MORBID (SEVERE) OBESITY DUE TO EXCESS CALORIES (HCC): Primary | ICD-10-CM

## 2024-07-25 PROCEDURE — RECHECK

## 2024-07-25 NOTE — PROGRESS NOTES
Weight Management Nutrition Class     Diagnosis: Obesity    Bariatric Surgeon: Dr. Parsons    Surgery: Gastric Bypass Laparoscopic    Class: Pt attended pre-op education session. Standardized packet of information for bariatric surgery was provided and reviewed with pt. Importance of lifestyle change and development of regular exercise routine stressed.   Pt. educated on two-week pre operative liquid protein liver shrinking diet.  Pt understands that the diet needs to be followed for 2 weeks prior to surgery. Handout reviewed.   Emphasized the need to drink 80 ounces of fluid per day while on the diet and to contact PCP to adjust any diabetes or blood pressure medicines prior to starting the diet.  Patient will not eat any solid food for 2 days prior to surgery, including 2 cups of non starchy vegetables to ensure that the stomach will be empty day of surgery. Reviewed Ensure pre-surgery ERAS drink instructions, protein supplement suggestions, post-operative clear liquid, full liquid, and pureed diet stages, post-operative nutrition rules and facts, and post-operative bariatric multivitamin/mineral recommendations and brand comparison. Reviewed instructions for stopping or tapering anti-obesity medications prior to surgery.      Pt given the opportunity to ask questions. Questions were answered. Pt verbalized understanding of all information provided. Pt appeared prepared for upcoming surgery. Contact information provided for any questions/concerns.

## 2024-07-26 RX ORDER — ACETAZOLAMIDE 250 MG/1
250 TABLET ORAL
Status: ON HOLD | COMMUNITY
Start: 2024-04-21 | End: 2024-08-06

## 2024-07-26 RX ORDER — RIZATRIPTAN BENZOATE 10 MG/1
TABLET, ORALLY DISINTEGRATING ORAL
COMMUNITY

## 2024-07-26 NOTE — PRE-PROCEDURE INSTRUCTIONS
Pre-Surgery Instructions:   Medication Instructions    acetaZOLAMIDE (DIAMOX) 250 mg tablet Hold day of surgery.    albuterol (2.5 mg/3 mL) 0.083 % nebulizer solution Uses PRN- OK to take day of surgery    albuterol (PROVENTIL HFA,VENTOLIN HFA) 90 mcg/act inhaler Uses PRN- OK to take day of surgery    Biotin w/ Vitamins C & E (HAIR SKIN & NAILS GUMMIES PO) Stop taking 7 days prior to surgery.    metFORMIN (GLUCOPHAGE-XR) 500 mg 24 hr tablet Hold day of surgery.    Multiple Vitamin (multivitamin) tablet Stop taking 7 days prior to surgery.    omeprazole (PriLOSEC) 20 mg delayed release capsule Take day of surgery.    ondansetron (ZOFRAN) 4 mg tablet Uses PRN- OK to take day of surgery    rizatriptan (MAXALT-MLT) 10 mg disintegrating tablet Uses PRN- OK to take day of surgery    sertraline (ZOLOFT) 100 mg tablet Take day of surgery.    triamcinolone (KENALOG) 0.1 % ointment Hold day of surgery.   Medication instructions for day surgery reviewed. Please use only a sip of water to take your instructed medications. Avoid all over the counter vitamins, supplements and NSAIDS for one week prior to surgery per anesthesia guidelines. Tylenol is ok to take as needed.     You will receive a call one business day prior to surgery with an arrival time and hospital directions. If your surgery is scheduled on a Monday, the hospital will be calling you on the Friday prior to your surgery. If you have not heard from anyone by 8pm, please call the hospital supervisor through the hospital  at [phone]. (Alexander [phone] or Wilson [phone]).    Do not eat or drink anything after midnight the night before your surgery, including candy, mints, lifesavers, or chewing gum. Do not drink alcohol 24hrs before your surgery. Try not to smoke at least 24hrs before your surgery.       Follow the pre surgery showering instructions as listed in the “My Surgical Experience Booklet” or otherwise provided by your surgeon's  office. Do not use a blade to shave the surgical area 1 week before surgery. It is okay to use a clean electric clippers up to 24 hours before surgery. Do not apply any lotions, creams, including makeup, cologne, deodorant, or perfumes after showering on the day of your surgery. Do not use dry shampoo, hair spray, hair gel, or any type of hair products.     No contact lenses, eye make-up, or artificial eyelashes. Remove nail polish, including gel polish, and any artificial, gel, or acrylic nails if possible. Remove all jewelry including rings and body piercing jewelry.     Wear causal clothing that is easy to take on and off. Consider your type of surgery.    Keep any valuables, jewelry, piercings at home. Please bring any specially ordered equipment (sling, braces) if indicated.    Arrange for a responsible person to drive you to and from the hospital on the day of your surgery. Please confirm the visitor policy for the day of your procedure when you receive your phone call with an arrival time.     Call the surgeon's office with any new illnesses, exposures, or additional questions prior to surgery.    Please reference your “My Surgical Experience Booklet” for additional information to prepare for your upcoming surgery.     Has 3 carb drinks w/ instructions.

## 2024-08-02 ENCOUNTER — ANESTHESIA EVENT (OUTPATIENT)
Dept: PERIOP | Facility: HOSPITAL | Age: 34
DRG: 621 | End: 2024-08-02
Payer: COMMERCIAL

## 2024-08-02 ENCOUNTER — TELEPHONE (OUTPATIENT)
Dept: BARIATRICS | Facility: CLINIC | Age: 34
End: 2024-08-02

## 2024-08-02 NOTE — TELEPHONE ENCOUNTER
Pre-op call was made to patient to follow up on how they are doing and to remind them to continue with all medical and dietary directions that were given at pre-op class regarding liver shrinking diet and hydration. Advised to stop eating all vegetables 48hrs prior to surgery unless directed otherwise by surgeon or RD. They were encouraged to purchase all vitamins and protein shakes for post op use as well as to begin Miralax three days prior to surgery as directed in Section 6 of their manual.  They were reminded of the Ensure Pre-surgery drinks protocol and to bring their completed yellow form with them to surgery.  Lastly, they were informed that they would be weighed the morning of surgery and to give the office a call if they had any further questions or concerns.

## 2024-08-05 ENCOUNTER — HOSPITAL ENCOUNTER (INPATIENT)
Facility: HOSPITAL | Age: 34
LOS: 1 days | Discharge: HOME | DRG: 621 | End: 2024-08-06
Attending: SURGERY | Admitting: SURGERY
Payer: COMMERCIAL

## 2024-08-05 ENCOUNTER — ANESTHESIA (OUTPATIENT)
Dept: PERIOP | Facility: HOSPITAL | Age: 34
DRG: 621 | End: 2024-08-05
Payer: COMMERCIAL

## 2024-08-05 DIAGNOSIS — I15.9 SECONDARY HYPERTENSION: Primary | ICD-10-CM

## 2024-08-05 DIAGNOSIS — Z98.84 BARIATRIC SURGERY STATUS: ICD-10-CM

## 2024-08-05 DIAGNOSIS — G93.2 BIH (BENIGN INTRACRANIAL HYPERTENSION): ICD-10-CM

## 2024-08-05 DIAGNOSIS — E11.9 TYPE 2 DIABETES MELLITUS WITHOUT COMPLICATION, WITHOUT LONG-TERM CURRENT USE OF INSULIN (HCC): ICD-10-CM

## 2024-08-05 PROBLEM — G43.909 MIGRAINES: Status: ACTIVE | Noted: 2024-08-05

## 2024-08-05 PROBLEM — I10 HTN (HYPERTENSION): Status: ACTIVE | Noted: 2024-08-05

## 2024-08-05 LAB
EXT PREGNANCY TEST URINE: NEGATIVE
EXT. CONTROL: NORMAL
GLUCOSE SERPL-MCNC: 128 MG/DL (ref 65–140)
GLUCOSE SERPL-MCNC: 151 MG/DL (ref 65–140)
GLUCOSE SERPL-MCNC: 151 MG/DL (ref 65–140)
GLUCOSE SERPL-MCNC: 194 MG/DL (ref 65–140)

## 2024-08-05 PROCEDURE — 94762 N-INVAS EAR/PLS OXIMTRY CONT: CPT

## 2024-08-05 PROCEDURE — 99222 1ST HOSP IP/OBS MODERATE 55: CPT | Performed by: NURSE PRACTITIONER

## 2024-08-05 PROCEDURE — 43644 LAP GASTRIC BYPASS/ROUX-EN-Y: CPT | Performed by: PHYSICIAN ASSISTANT

## 2024-08-05 PROCEDURE — 81025 URINE PREGNANCY TEST: CPT | Performed by: SURGERY

## 2024-08-05 PROCEDURE — C9290 INJ, BUPIVACAINE LIPOSOME: HCPCS | Performed by: SURGERY

## 2024-08-05 PROCEDURE — 82948 REAGENT STRIP/BLOOD GLUCOSE: CPT

## 2024-08-05 PROCEDURE — 43644 LAP GASTRIC BYPASS/ROUX-EN-Y: CPT | Performed by: SURGERY

## 2024-08-05 PROCEDURE — 0D164ZA BYPASS STOMACH TO JEJUNUM, PERCUTANEOUS ENDOSCOPIC APPROACH: ICD-10-PCS | Performed by: SURGERY

## 2024-08-05 RX ORDER — ALBUTEROL SULFATE 2.5 MG/3ML
2.5 SOLUTION RESPIRATORY (INHALATION) EVERY 6 HOURS PRN
Status: DISCONTINUED | OUTPATIENT
Start: 2024-08-05 | End: 2024-08-06 | Stop reason: HOSPADM

## 2024-08-05 RX ORDER — DIPHENHYDRAMINE HCL 25 MG
25 TABLET ORAL
Status: DISCONTINUED | OUTPATIENT
Start: 2024-08-05 | End: 2024-08-06 | Stop reason: HOSPADM

## 2024-08-05 RX ORDER — SODIUM CHLORIDE 9 MG/ML
INJECTION INTRAVENOUS AS NEEDED
Status: DISCONTINUED | OUTPATIENT
Start: 2024-08-05 | End: 2024-08-05 | Stop reason: HOSPADM

## 2024-08-05 RX ORDER — INSULIN LISPRO 100 [IU]/ML
2-12 INJECTION, SOLUTION INTRAVENOUS; SUBCUTANEOUS EVERY 6 HOURS SCHEDULED
Status: DISCONTINUED | OUTPATIENT
Start: 2024-08-05 | End: 2024-08-06 | Stop reason: HOSPADM

## 2024-08-05 RX ORDER — KETAMINE HCL IN NACL, ISO-OSM 100MG/10ML
SYRINGE (ML) INJECTION AS NEEDED
Status: DISCONTINUED | OUTPATIENT
Start: 2024-08-05 | End: 2024-08-05

## 2024-08-05 RX ORDER — KETOROLAC TROMETHAMINE 30 MG/ML
15 INJECTION, SOLUTION INTRAMUSCULAR; INTRAVENOUS EVERY 6 HOURS SCHEDULED
Status: DISCONTINUED | OUTPATIENT
Start: 2024-08-05 | End: 2024-08-06 | Stop reason: HOSPADM

## 2024-08-05 RX ORDER — SODIUM CHLORIDE, SODIUM LACTATE, POTASSIUM CHLORIDE, CALCIUM CHLORIDE 600; 310; 30; 20 MG/100ML; MG/100ML; MG/100ML; MG/100ML
INJECTION, SOLUTION INTRAVENOUS CONTINUOUS PRN
Status: DISCONTINUED | OUTPATIENT
Start: 2024-08-05 | End: 2024-08-05

## 2024-08-05 RX ORDER — MAGNESIUM HYDROXIDE 1200 MG/15ML
LIQUID ORAL AS NEEDED
Status: DISCONTINUED | OUTPATIENT
Start: 2024-08-05 | End: 2024-08-05 | Stop reason: HOSPADM

## 2024-08-05 RX ORDER — ACETAMINOPHEN 10 MG/ML
1000 INJECTION, SOLUTION INTRAVENOUS EVERY 8 HOURS SCHEDULED
Status: DISCONTINUED | OUTPATIENT
Start: 2024-08-05 | End: 2024-08-06 | Stop reason: HOSPADM

## 2024-08-05 RX ORDER — ROCURONIUM BROMIDE 10 MG/ML
INJECTION, SOLUTION INTRAVENOUS AS NEEDED
Status: DISCONTINUED | OUTPATIENT
Start: 2024-08-05 | End: 2024-08-05

## 2024-08-05 RX ORDER — MIDAZOLAM HYDROCHLORIDE 2 MG/2ML
INJECTION, SOLUTION INTRAMUSCULAR; INTRAVENOUS AS NEEDED
Status: DISCONTINUED | OUTPATIENT
Start: 2024-08-05 | End: 2024-08-05

## 2024-08-05 RX ORDER — OXYCODONE HCL 5 MG/5 ML
5 SOLUTION, ORAL ORAL EVERY 4 HOURS PRN
Status: DISCONTINUED | OUTPATIENT
Start: 2024-08-05 | End: 2024-08-06 | Stop reason: HOSPADM

## 2024-08-05 RX ORDER — CELECOXIB 200 MG/1
200 CAPSULE ORAL ONCE
Status: COMPLETED | OUTPATIENT
Start: 2024-08-05 | End: 2024-08-05

## 2024-08-05 RX ORDER — HYDROMORPHONE HCL/PF 1 MG/ML
1 SYRINGE (ML) INJECTION EVERY 4 HOURS PRN
Status: DISCONTINUED | OUTPATIENT
Start: 2024-08-05 | End: 2024-08-06 | Stop reason: HOSPADM

## 2024-08-05 RX ORDER — ONDANSETRON 2 MG/ML
INJECTION INTRAMUSCULAR; INTRAVENOUS AS NEEDED
Status: DISCONTINUED | OUTPATIENT
Start: 2024-08-05 | End: 2024-08-05

## 2024-08-05 RX ORDER — ONDANSETRON 2 MG/ML
4 INJECTION INTRAMUSCULAR; INTRAVENOUS EVERY 6 HOURS PRN
Status: DISCONTINUED | OUTPATIENT
Start: 2024-08-05 | End: 2024-08-06 | Stop reason: HOSPADM

## 2024-08-05 RX ORDER — METOCLOPRAMIDE HYDROCHLORIDE 5 MG/ML
10 INJECTION INTRAMUSCULAR; INTRAVENOUS EVERY 6 HOURS PRN
Status: DISCONTINUED | OUTPATIENT
Start: 2024-08-05 | End: 2024-08-06 | Stop reason: HOSPADM

## 2024-08-05 RX ORDER — PROPOFOL 10 MG/ML
INJECTION, EMULSION INTRAVENOUS CONTINUOUS PRN
Status: DISCONTINUED | OUTPATIENT
Start: 2024-08-05 | End: 2024-08-05

## 2024-08-05 RX ORDER — FENTANYL CITRATE 50 UG/ML
INJECTION, SOLUTION INTRAMUSCULAR; INTRAVENOUS AS NEEDED
Status: DISCONTINUED | OUTPATIENT
Start: 2024-08-05 | End: 2024-08-05

## 2024-08-05 RX ORDER — OXYCODONE HCL 5 MG/5 ML
10 SOLUTION, ORAL ORAL EVERY 4 HOURS PRN
Status: DISCONTINUED | OUTPATIENT
Start: 2024-08-05 | End: 2024-08-06 | Stop reason: HOSPADM

## 2024-08-05 RX ORDER — SIMETHICONE 80 MG
80 TABLET,CHEWABLE ORAL EVERY 12 HOURS SCHEDULED
Status: DISCONTINUED | OUTPATIENT
Start: 2024-08-05 | End: 2024-08-06 | Stop reason: HOSPADM

## 2024-08-05 RX ORDER — FENTANYL CITRATE/PF 50 MCG/ML
50 SYRINGE (ML) INJECTION
Status: DISCONTINUED | OUTPATIENT
Start: 2024-08-05 | End: 2024-08-05 | Stop reason: HOSPADM

## 2024-08-05 RX ORDER — SODIUM CHLORIDE, SODIUM LACTATE, POTASSIUM CHLORIDE, CALCIUM CHLORIDE 600; 310; 30; 20 MG/100ML; MG/100ML; MG/100ML; MG/100ML
100 INJECTION, SOLUTION INTRAVENOUS CONTINUOUS
Status: DISCONTINUED | OUTPATIENT
Start: 2024-08-05 | End: 2024-08-05

## 2024-08-05 RX ORDER — SODIUM CHLORIDE, SODIUM LACTATE, POTASSIUM CHLORIDE, CALCIUM CHLORIDE 600; 310; 30; 20 MG/100ML; MG/100ML; MG/100ML; MG/100ML
100 INJECTION, SOLUTION INTRAVENOUS CONTINUOUS
Status: DISCONTINUED | OUTPATIENT
Start: 2024-08-05 | End: 2024-08-06 | Stop reason: HOSPADM

## 2024-08-05 RX ORDER — ONDANSETRON 2 MG/ML
4 INJECTION INTRAMUSCULAR; INTRAVENOUS ONCE AS NEEDED
Status: DISCONTINUED | OUTPATIENT
Start: 2024-08-05 | End: 2024-08-05 | Stop reason: HOSPADM

## 2024-08-05 RX ORDER — PROMETHAZINE HYDROCHLORIDE 25 MG/ML
25 INJECTION, SOLUTION INTRAMUSCULAR; INTRAVENOUS EVERY 6 HOURS PRN
Status: DISCONTINUED | OUTPATIENT
Start: 2024-08-05 | End: 2024-08-06 | Stop reason: HOSPADM

## 2024-08-05 RX ORDER — PROPOFOL 10 MG/ML
INJECTION, EMULSION INTRAVENOUS AS NEEDED
Status: DISCONTINUED | OUTPATIENT
Start: 2024-08-05 | End: 2024-08-05

## 2024-08-05 RX ORDER — SODIUM CHLORIDE, SODIUM LACTATE, POTASSIUM CHLORIDE, CALCIUM CHLORIDE 600; 310; 30; 20 MG/100ML; MG/100ML; MG/100ML; MG/100ML
125 INJECTION, SOLUTION INTRAVENOUS CONTINUOUS
Status: DISCONTINUED | OUTPATIENT
Start: 2024-08-05 | End: 2024-08-05

## 2024-08-05 RX ORDER — ALBUTEROL SULFATE 90 UG/1
2 AEROSOL, METERED RESPIRATORY (INHALATION) EVERY 4 HOURS PRN
Status: DISCONTINUED | OUTPATIENT
Start: 2024-08-05 | End: 2024-08-06 | Stop reason: HOSPADM

## 2024-08-05 RX ORDER — ACETAMINOPHEN 325 MG/1
1000 TABLET ORAL EVERY 8 HOURS SCHEDULED
Start: 2024-08-05 | End: 2024-08-12

## 2024-08-05 RX ORDER — BUPIVACAINE HYDROCHLORIDE 5 MG/ML
INJECTION, SOLUTION EPIDURAL; INTRACAUDAL AS NEEDED
Status: DISCONTINUED | OUTPATIENT
Start: 2024-08-05 | End: 2024-08-05 | Stop reason: HOSPADM

## 2024-08-05 RX ORDER — ENOXAPARIN SODIUM 100 MG/ML
40 INJECTION SUBCUTANEOUS ONCE
Status: COMPLETED | OUTPATIENT
Start: 2024-08-05 | End: 2024-08-05

## 2024-08-05 RX ORDER — ENOXAPARIN SODIUM 100 MG/ML
40 INJECTION SUBCUTANEOUS DAILY
Status: DISCONTINUED | OUTPATIENT
Start: 2024-08-06 | End: 2024-08-06 | Stop reason: HOSPADM

## 2024-08-05 RX ORDER — FAMOTIDINE 10 MG/ML
20 INJECTION, SOLUTION INTRAVENOUS EVERY 12 HOURS SCHEDULED
Status: DISCONTINUED | OUTPATIENT
Start: 2024-08-05 | End: 2024-08-06 | Stop reason: HOSPADM

## 2024-08-05 RX ORDER — ACETAMINOPHEN 10 MG/ML
1000 INJECTION, SOLUTION INTRAVENOUS ONCE
Status: COMPLETED | OUTPATIENT
Start: 2024-08-05 | End: 2024-08-05

## 2024-08-05 RX ORDER — DEXAMETHASONE SODIUM PHOSPHATE 10 MG/ML
INJECTION, SOLUTION INTRAMUSCULAR; INTRAVENOUS AS NEEDED
Status: DISCONTINUED | OUTPATIENT
Start: 2024-08-05 | End: 2024-08-05

## 2024-08-05 RX ADMIN — Medication 10 MG: at 10:45

## 2024-08-05 RX ADMIN — ACETAMINOPHEN 1000 MG: 10 INJECTION INTRAVENOUS at 16:29

## 2024-08-05 RX ADMIN — ROCURONIUM BROMIDE 20 MG: 10 INJECTION, SOLUTION INTRAVENOUS at 10:39

## 2024-08-05 RX ADMIN — SODIUM CHLORIDE, SODIUM LACTATE, POTASSIUM CHLORIDE, AND CALCIUM CHLORIDE 125 ML/HR: .6; .31; .03; .02 INJECTION, SOLUTION INTRAVENOUS at 08:20

## 2024-08-05 RX ADMIN — ENOXAPARIN SODIUM 40 MG: 40 INJECTION SUBCUTANEOUS at 08:21

## 2024-08-05 RX ADMIN — Medication 20 MG: at 10:08

## 2024-08-05 RX ADMIN — CEFAZOLIN 3000 MG: 1 INJECTION, POWDER, FOR SOLUTION INTRAMUSCULAR; INTRAVENOUS at 09:49

## 2024-08-05 RX ADMIN — DEXMEDETOMIDINE HYDROCHLORIDE 12 MCG: 100 INJECTION, SOLUTION INTRAVENOUS at 09:51

## 2024-08-05 RX ADMIN — ONDANSETRON 4 MG: 2 INJECTION INTRAMUSCULAR; INTRAVENOUS at 11:16

## 2024-08-05 RX ADMIN — SERTRALINE HYDROCHLORIDE 150 MG: 100 TABLET ORAL at 20:34

## 2024-08-05 RX ADMIN — SUGAMMADEX 200 MG: 100 INJECTION, SOLUTION INTRAVENOUS at 11:24

## 2024-08-05 RX ADMIN — FOSAPREPITANT 150 MG: 150 INJECTION, POWDER, LYOPHILIZED, FOR SOLUTION INTRAVENOUS at 08:20

## 2024-08-05 RX ADMIN — MIDAZOLAM 2 MG: 1 INJECTION INTRAMUSCULAR; INTRAVENOUS at 09:51

## 2024-08-05 RX ADMIN — SODIUM CHLORIDE, SODIUM LACTATE, POTASSIUM CHLORIDE, AND CALCIUM CHLORIDE: .6; .31; .03; .02 INJECTION, SOLUTION INTRAVENOUS at 09:50

## 2024-08-05 RX ADMIN — SIMETHICONE 80 MG: 80 TABLET, CHEWABLE ORAL at 20:34

## 2024-08-05 RX ADMIN — CELECOXIB 200 MG: 200 CAPSULE ORAL at 08:21

## 2024-08-05 RX ADMIN — PROPOFOL 100 MCG/KG/MIN: 10 INJECTION, EMULSION INTRAVENOUS at 09:57

## 2024-08-05 RX ADMIN — PROPOFOL 200 MG: 10 INJECTION, EMULSION INTRAVENOUS at 09:57

## 2024-08-05 RX ADMIN — FENTANYL CITRATE 50 MCG: 0.05 INJECTION, SOLUTION INTRAMUSCULAR; INTRAVENOUS at 12:00

## 2024-08-05 RX ADMIN — FENTANYL CITRATE 100 MCG: 50 INJECTION INTRAMUSCULAR; INTRAVENOUS at 09:56

## 2024-08-05 RX ADMIN — KETOROLAC TROMETHAMINE 15 MG: 30 INJECTION, SOLUTION INTRAMUSCULAR; INTRAVENOUS at 18:09

## 2024-08-05 RX ADMIN — ROCURONIUM BROMIDE 50 MG: 10 INJECTION, SOLUTION INTRAVENOUS at 09:58

## 2024-08-05 RX ADMIN — ACETAMINOPHEN 1000 MG: 10 INJECTION INTRAVENOUS at 08:20

## 2024-08-05 RX ADMIN — DEXAMETHASONE SODIUM PHOSPHATE 10 MG: 10 INJECTION, SOLUTION INTRAMUSCULAR; INTRAVENOUS at 09:58

## 2024-08-05 RX ADMIN — FENTANYL CITRATE 50 MCG: 0.05 INJECTION, SOLUTION INTRAMUSCULAR; INTRAVENOUS at 12:30

## 2024-08-05 RX ADMIN — FAMOTIDINE 20 MG: 10 INJECTION, SOLUTION INTRAVENOUS at 18:09

## 2024-08-05 NOTE — PLAN OF CARE
Problem: PAIN - ADULT  Goal: Verbalizes/displays adequate comfort level or baseline comfort level  Description: Interventions:  - Encourage patient to monitor pain and request assistance  - Assess pain using appropriate pain scale  - Administer analgesics based on type and severity of pain and evaluate response  - Implement non-pharmacological measures as appropriate and evaluate response  - Consider cultural and social influences on pain and pain management  - Notify physician/advanced practitioner if interventions unsuccessful or patient reports new pain  Outcome: Progressing     Problem: INFECTION - ADULT  Goal: Absence or prevention of progression during hospitalization  Description: INTERVENTIONS:  - Assess and monitor for signs and symptoms of infection  - Monitor lab/diagnostic results  - Monitor all insertion sites, i.e. indwelling lines, tubes, and drains  - Monitor endotracheal if appropriate and nasal secretions for changes in amount and color  - Rice appropriate cooling/warming therapies per order  - Administer medications as ordered  - Instruct and encourage patient and family to use good hand hygiene technique  - Identify and instruct in appropriate isolation precautions for identified infection/condition  Outcome: Progressing  Goal: Absence of fever/infection during neutropenic period  Description: INTERVENTIONS:  - Monitor WBC    Outcome: Progressing     Problem: SAFETY ADULT  Goal: Patient will remain free of falls  Description: INTERVENTIONS:  - Educate patient/family on patient safety including physical limitations  - Instruct patient to call for assistance with activity   - Consult OT/PT to assist with strengthening/mobility   - Keep Call bell within reach  - Keep bed low and locked with side rails adjusted as appropriate  - Keep care items and personal belongings within reach  - Initiate and maintain comfort rounds  - Make Fall Risk Sign visible to staff  - Offer Toileting every 2 Hours,  in advance of need  - Initiate/Maintain bed alarm  - Obtain necessary fall risk management equipment: call bell within reach  - Apply yellow socks and bracelet for high fall risk patients  - Consider moving patient to room near nurses station  Outcome: Progressing  Goal: Maintain or return to baseline ADL function  Description: INTERVENTIONS:  -  Assess patient's ability to carry out ADLs; assess patient's baseline for ADL function and identify physical deficits which impact ability to perform ADLs (bathing, care of mouth/teeth, toileting, grooming, dressing, etc.)  - Assess/evaluate cause of self-care deficits   - Assess range of motion  - Assess patient's mobility; develop plan if impaired  - Assess patient's need for assistive devices and provide as appropriate  - Encourage maximum independence but intervene and supervise when necessary  - Involve family in performance of ADLs  - Assess for home care needs following discharge   - Consider OT consult to assist with ADL evaluation and planning for discharge  - Provide patient education as appropriate  Outcome: Progressing  Goal: Maintains/Returns to pre admission functional level  Description: INTERVENTIONS:  - Perform AM-PAC 6 Click Basic Mobility/ Daily Activity assessment daily.  - Set and communicate daily mobility goal to care team and patient/family/caregiver.   - Collaborate with rehabilitation services on mobility goals if consulted  - Perform Range of Motion 3 times a day.  - Reposition patient every 2 hours.  - Dangle patient 3 times a day  - Stand patient 3 times a day  - Ambulate patient 3 times a day  - Out of bed to chair 3 times a day   - Out of bed for meals 3 times a day  - Out of bed for toileting  - Record patient progress and toleration of activity level   Outcome: Progressing

## 2024-08-05 NOTE — ASSESSMENT & PLAN NOTE
Lab Results   Component Value Date    HGBA1C 5.7 (H) 03/02/2024       Recent Labs     08/05/24  0812 08/05/24  1215   POCGLU 194* 151*       Blood Sugar Average: Last 72 hrs:  (P) 172.5  Last A1c showing tight control 3/2024 at 5.7  Will repeat A1c for completeness  BG elevated this admission   Continue SSI and blood glucose monitoring

## 2024-08-05 NOTE — ASSESSMENT & PLAN NOTE
Evident by bmi 51.55  S/p gastric bypass today.   Encourage ongoing diet modifications and regular exercise

## 2024-08-05 NOTE — DISCHARGE INSTR - AVS FIRST PAGE
Bariatric/Weight Loss Surgery  Hospital Discharge Instructions  ACTIVITY:  Progress as feels comfortable - a good rule is:  if you are doing something and it begins to hurt, stop doing the activity. Walk every hour while at home.  You may walk stairs if you do so slowly  You may shower 48 hours after surgery.  Use your incentive spirometer 10 times per hour while awake for 1 week  Do NOT drive for 48 hours after surgery. No driving 24 hours after taking certain prescription pain medications . Examples of such medication are Percocet, Darvocet, Oxycodone, Tylenol #3, and Tylenol with Codeine. Follow your pharmacist’s orders.    DIET  Stay on a liquid diet for 7 days after your surgery date, sipping slowly. Refer to your manual for examples of choices. Remember to keep your liquids sugar free or low calorie. You may have protein drinks. Make sure to drink 48 to 64 ounces per day of fluids.   You may advance to a pureed diet one week after surgery as instructed by your diet progression pamphlet. Once you get approval from your surgeon at your first post operative visit you may advance to the soft diet.     MEDICATIONS:  The abdominal nerve block will wear off during the first 1-2 days that you are home, and you may become sore. Continue to take your Tylenol and your pain medication as instructed.   Start vitamins and minerals per Annmarie's instructions  Anti-acid Medication as per prescription.  Other medications as indicated on the Physician Patient Discharge Instructions form given to you at the time of discharge.  Make sure that you are splitting your pill or tablet medications in halves or fourths or even crushing them before you take them. Capsules should be opened and mixed with water or jello. You need to do this for at least 4 weeks after surgery. Eventually you will be able to take your medications the regular way as they were prescribed.   You will need to consult with your Family Doctor in regards to all your  prescribed medication, particularly those for blood pressure and diabetes.  As you lose weight, medical conditions may change, requiring an alteration or elimination of the drug dose.   DO NOT TAKE BIRTH CONTROL(BC) MEDICATIONS, INSERT BC VAGINAL RINGS, OR PLACE IUD OR ANY OTHER BC METHODS UNTIL 31 DAYS FROM DAY OF DISCHARGE FROM HOSPITAL. THIS PLACES YOU AT HIGH RISK FOR A POTENTIALLY LIFE THREATENING BLOOD CLOT. Remember to always use barrier methods for birth control and speak to your GYN about using two forms of birth control to start 31 days after surgery. It is very important to avoid pregnancy until at least 18-24 months after surgery.       INCISION CARE  You may shower and get incisions wet 2 days after surgery. No soaking tub baths or swimming for 30 days after surgery. Keep abdominal area and incisions clean. Use soap and water to create a good lather and rinse off. Do not scrub incisions.   If you have a drain, empty the drain as the nurses instructed.    FOLLOW-UP APPOINTMENT should be made for one week after discharge. Call surgeon’s office at [phone] to schedule an appointment.    CALL YOUR DOCTOR FOR:  pain not controlled by pain medications, a temperature greater than 101.5° F, any increase or change in drainage or redness from any incision, any vomiting or inability to keep liquids down, shortness of breath, shoulder pain, or bleeding

## 2024-08-05 NOTE — CONSULTS
Pending sale to [facility]  Consult  Name: Kiana Kapoor 34 y.o. female I MRN: [id number]  Unit/Bed#: -01 I Date of Admission: 8/5/2024   Date of Service: 8/5/2024  Hospital Day: 0    Inpatient consult to Internal Medicine  Consult performed by: CONSTANTIN Mora  Consult ordered by: Dayami Gutierrez PA-C          Assessment & Plan   Type 2 diabetes mellitus without complication, without long-term current use of insulin (HCC)  Assessment & Plan  Lab Results   Component Value Date    HGBA1C 5.7 (H) 03/02/2024       Recent Labs     08/05/24  0812 08/05/24  1215   POCGLU 194* 151*       Blood Sugar Average: Last 72 hrs:  (P) 172.5  Last A1c showing tight control 3/2024 at 5.7  Will repeat A1c for completeness  BG elevated this admission   Continue SSI and blood glucose monitoring    HTN (hypertension)  Assessment & Plan  BP reasonable -130  Patient reports hx of ICH not HTN.  Continue to monitor vitals.     * Morbid (severe) obesity due to excess calories (HCC)  Assessment & Plan  Known history  Post op today rout-n-Y gastric bypass  Continue pain control/diet per primary team.   Encourage frequent ambulation and IS    Migraines  Assessment & Plan  Known history  Takes diamox and Maxalt at home.   Will  hold for now patient asymptomatic  Patient reports hx of ICH     Class 3 severe obesity due to excess calories with serious comorbidity and body mass index (BMI) of 50.0 to 59.9 in adult (HCC)  Assessment & Plan  Evident by bmi 51.55  S/p gastric bypass today.   Encourage ongoing diet modifications and regular exercise     PCOS (polycystic ovarian syndrome)  Assessment & Plan  Hx noted           VTE Prophylaxis: VTE Score: 5 High Risk (Score >/= 5) - Pharmacological DVT Prophylaxis Contraindicated. Sequential Compression Devices Ordered.    Mobility:      JH AM PAC score not obtained at time of note. Patient is OOB to chair    Recommendations for Discharge:  Discontinue metformin per out  patient PCP   Can resume diamox if needed for ICH     Total Time Spent on Date of Encounter in care of patient: 45 mins. This time was spent on one or more of the following: performing physical exam; counseling and coordination of care; obtaining or reviewing history; documenting in the medical record; reviewing/ordering tests, medications or procedures; communicating with other healthcare professionals and discussing with patient's family/caregivers.    Collaboration of Care: Were Recommendations Directly Discussed with Primary Treatment Team? No    History of Present Illness:  Kiana Kapoor is a 34 y.o. female who is originally admitted to the bariatric service due to sharona-en-Y gastric bypass. We are consulted for diabetes. Patient reporting pain 4/10 comfortable denies fever chills sob. Patient reports per pcp she is to stop metformin on discharge. Has hx of ICH not HTN she is on diamox that she has not taken for 2 days and has remained asymptomatic. Patient feels bloated but has no other complaints.     Review of Systems:  Review of Systems   Constitutional:  Negative for appetite change, chills and fever.   HENT:  Negative for congestion.    Respiratory:  Negative for cough and shortness of breath.    Cardiovascular:  Negative for chest pain and leg swelling.   Gastrointestinal:  Positive for abdominal distention and abdominal pain. Negative for nausea and vomiting.   Genitourinary:  Negative for dysuria and flank pain.   Neurological:  Negative for dizziness, weakness, light-headedness and headaches.   Psychiatric/Behavioral: Negative.         Past Medical and Surgical History:   Past Medical History:   Diagnosis Date    Asthma     Asthma 02/01/2022    Gets bronchitis with colds. Uses Albuterol for colds.     Depression     Well controlled with Zoloft    Diabetes mellitus (HCC)     Encounter for gynecological examination without abnormal finding 11/29/2021    GERD (gastroesophageal reflux disease)      "Hidradenitis suppurativa 03/25/2022    Irritable bowel syndrome     Morbid obesity with body mass index (BMI) of 60.0 to 69.9 in adult (HCC)     Multiple gastric ulcers     Pre-conception counseling 01/31/2022    Getting  August 2022, desires pregnancy Hx PCOS, ovarian drilling Weight reduction advised by THANIA Quintero -Check A1C Taking multivitamin (confirm folate). S/p COVID vax (no booster yet, due March 2022)       Past Surgical History:   Procedure Laterality Date    DILATION AND CURETTAGE OF UTERUS      endometrial thickening    EGD AND COLONOSCOPY  2013    FL LUMBAR PUNCTURE DIAGNOSTIC  4/8/2024    OVARY SURGERY      ovarian drilling     WISDOM TOOTH EXTRACTION         Meds/Allergies:  all medications and allergies reviewed    Allergies:   Allergies   Allergen Reactions    Iodine - Food Allergy GI Intolerance    Mite (D. Farinae) Eye Swelling and Itching    Red Dye - Food Allergy Rash       Social History:  Marital Status: /Civil Union  Substance Use History:   Social History     Substance and Sexual Activity   Alcohol Use Not Currently     Social History     Tobacco Use   Smoking Status Never   Smokeless Tobacco Never     Social History     Substance and Sexual Activity   Drug Use Yes    Types: Marijuana    Comment: medical marijuana- last smoked 8/3/24       Family History:  Family History   Problem Relation Age of Onset    No Known Problems Sister     No Known Problems Sister     No Known Problems Brother     No Known Problems Brother     Diabetes Maternal Grandfather        Physical Exam:   Vitals:   Blood Pressure: 127/65 (08/05/24 1500)  Pulse: (!) 47 (08/05/24 1553)  Temperature: 98.6 °F (37 °C) (08/05/24 1553)  Temp Source: Temporal (08/05/24 0805)  Respirations: 18 (08/05/24 1553)  Height: 5' 3\" (160 cm) (08/05/24 0805)  Weight - Scale: 132 kg (291 lb 0.1 oz) (08/05/24 0805)  SpO2: 95 % (08/05/24 1601)    Physical Exam  Vitals and nursing note reviewed.   Constitutional:       General: " She is not in acute distress.     Appearance: She is well-developed. She is obese.   HENT:      Head: Normocephalic and atraumatic.   Eyes:      Conjunctiva/sclera: Conjunctivae normal.   Cardiovascular:      Rate and Rhythm: Normal rate and regular rhythm.      Heart sounds: No murmur heard.  Pulmonary:      Effort: Pulmonary effort is normal. No respiratory distress.      Breath sounds: Normal breath sounds.   Abdominal:      General: There is distension.      Palpations: Abdomen is soft.      Tenderness: There is abdominal tenderness. There is guarding.   Musculoskeletal:         General: No swelling.      Cervical back: Neck supple.   Skin:     General: Skin is warm and dry.      Capillary Refill: Capillary refill takes less than 2 seconds.   Neurological:      Mental Status: She is alert and oriented to person, place, and time.   Psychiatric:         Mood and Affect: Mood normal.        Additional Data:   Lab Results:                    Lab Results   Component Value Date    HGBA1C 5.7 (H) 03/02/2024    HGBA1C 5.4 01/24/2024    HGBA1C 5.2 10/17/2023     Results from last 7 days   Lab Units 08/05/24  1215 08/05/24  0812   POC GLUCOSE mg/dl 151* 194*           Imaging: No pertinent imaging reviewed.  No orders to display       EKG, Pathology, and Other Studies Reviewed on Admission:   EKG: No EKG obtained.    ** Please Note: This note may have been constructed using a voice recognition system. **

## 2024-08-05 NOTE — ASSESSMENT & PLAN NOTE
Known history  Takes diamox and Maxalt at home.   Will  hold for now patient asymptomatic  Patient reports hx of ICH

## 2024-08-05 NOTE — RESPIRATORY THERAPY NOTE
RT Protocol Note  Kiana Kapoor 34 y.o. female MRN: [id number]  Unit/Bed#: -01 Encounter: [id number]    Assessment    Principal Problem:    Morbid (severe) obesity due to excess calories ([facility])  Active Problems:    Type 2 diabetes mellitus without complication, without long-term current use of insulin ([facility])    PCOS (polycystic ovarian syndrome)    Class 3 severe obesity due to excess calories with serious comorbidity and body mass index (BMI) of 50.0 to 59.9 in adult ([facility])    HTN (hypertension)    Migraines      Home Pulmonary Medications:         Past Medical History:   Diagnosis Date    Asthma     Asthma 02/01/2022    Gets bronchitis with colds. Uses Albuterol for colds.     Depression     Well controlled with Zoloft    Diabetes mellitus ([facility])     Encounter for gynecological examination without abnormal finding 11/29/2021    GERD (gastroesophageal reflux disease)     Hidradenitis suppurativa 03/25/2022    Irritable bowel syndrome     Morbid obesity with body mass index (BMI) of 60.0 to 69.9 in adult ([facility])     Multiple gastric ulcers     Pre-conception counseling 01/31/2022    Getting  August 2022, desires pregnancy Hx PCOS, ovarian drilling Weight reduction advised by THANIA Quintero -Check A1C Taking multivitamin (confirm folate). S/p COVID vax (no booster yet, due March 2022)     Social History     Socioeconomic History    Marital status: /Civil Union     Spouse name: None    Number of children: None    Years of education: None    Highest education level: None   Occupational History    None   Tobacco Use    Smoking status: Never    Smokeless tobacco: Never   Vaping Use    Vaping status: Former   Substance and Sexual Activity    Alcohol use: Not Currently    Drug use: Yes     Types: Marijuana     Comment: medical marijuana- last smoked 8/3/24    Sexual activity: Yes     Partners: Male     Birth control/protection: None   Other Topics Concern    None   Social History Narrative    None     Social  "Determinants of Health     Financial Resource Strain: Not on file   Food Insecurity: Not on file   Transportation Needs: Not on file   Physical Activity: Not on file   Stress: Not on file   Social Connections: Unknown (6/18/2024)    Received from [facility]     How often do you feel lonely or isolated from those around you? (Adult - for ages 18 years and over): Not on file   Intimate Partner Violence: Not on file   Housing Stability: Not on file       Subjective         Objective    Physical Exam:        Vitals:  Blood pressure 127/65, pulse (!) 47, temperature 98.6 °F (37 °C), resp. rate 18, height 5' 3\" (1.6 m), weight 132 kg (291 lb 0.1 oz), last menstrual period 08/14/2023, SpO2 95%.          Imaging and other studies: I have personally reviewed pertinent reports.            Plan    Respiratory Plan: Home Bronchodilator Patient pathway        Resp Comments: pt uses albuterol prn @ home  will continue with prn albuterol   "

## 2024-08-05 NOTE — OP NOTE
OPERATIVE REPORT  PATIENT NAME: Kiana Kapoor    :  1990  MRN: [id number]  Pt Location: [facility] OR ROOM 04    SURGERY DATE: 2024    Surgeons and Role:     * Raisa Parsons MD - Primary     * Dayami Gutierrez PA-C - Assisting    Preop Diagnosis:  Morbid obesity (HCC) [E66.01]  Diabetes mellitus (HCC) [E11.9]  Polycystic ovaries [E28.2]  Recurrent major depression in partial remission (HCC) [F33.41]    Post-Op Diagnosis Codes:     * Morbid obesity (HCC) [E66.01]     * Diabetes mellitus (HCC) [E11.9]     * Polycystic ovaries [E28.2]     * Recurrent major depression in partial remission (HCC) [F33.41]    Procedure(s):  BYPASS GASTRIC  BRENDA-EN-Y LAPAROSCOPIC    Specimen(s):  * No specimens in log *    Estimated Blood Loss:   20 mL    Drains:  * No LDAs found *    Anesthesia Type:   General    Operative Indications:  Morbid obesity (HCC) [E66.01]  Diabetes mellitus (HCC) [E11.9]  Polycystic ovaries [E28.2]  Recurrent major depression in partial remission (HCC) [F33.41]  BMI 51.55 kg/m2    Operative Findings:  Negative leak test      Complications:   None    Procedure and Technique:  The patient was identified by name, armband and conversation. The patient was then brought to the operative theatre. After successful induction of general anesthesia, the patient was prepped and draped in the usual sterile fashion. A timeout was performed and all were in agreement.  Optiview technique was used to gain entrance into the abdomen with a 12 mm 0 degree laparoscope in the left upper quadrant. The abdomen was then insufflated to 15 mmHg. Three 12 mm ports were then placed in the right upper quadrant, left lower quadrant, and umbilicus.  A 5 mm right lower quadrant port was placed. A 5 mm epigastric liver retractor was placed. Four quadrant Exparel/Marcaine transversus abdominus plane block was performed.  The omentum was split using ultrasonic jorge. Starting at the ligament of treitz 50 cm of small bowel was counted and  divided with Ethicon stapler, white load. Another 150 cm of small bowel was counted from here and a stapled jejunojejunostomy was created with the Ethicon stapler, white load. The enterotomy was stapled closed with a white load. The mesenteric defect was then closed with a running 2-0 ethibond.    The gastroesophageal fat pad was dissected. Perigastric dissection was used to enter the lesser sac 5 cm distal to the gastroesophageal junction. Gastric pouch was then created with one horizontal firing and three vertical firings with the Ethicon stapler, white load. The sharona limb was then brought antecolic/antegastric and a handsewn end to side gastrojejunostomy was then created with 3-0 PDS in two layers over a 34 Faroese levacuator tube.  The tube was then withdrawn into the gastric pouch. Air leak test was negative. Mahmood's space was then closed with a running 2-0 ethibond suture. All port sites were examined for bleeding as we exited the abdomen to ensure hemostasis. Port sites were then closed with monocryl and dermabond. All instrument, sponge and needle counts were correct.       I was present for the entire procedure., A qualified resident physician was not available., and A physician assistant was required during the procedure for retraction, tissue handling, dissection and suturing, traction/countertraction, stapling, and camera driving.    Patient Disposition:  PACU         SIGNATURE: Raisa Parsons MD  DATE: August 5, 2024  TIME: 11:33 AM

## 2024-08-05 NOTE — ASSESSMENT & PLAN NOTE
Known history  Post op today rout-n-Y gastric bypass  Continue pain control/diet per primary team.   Encourage frequent ambulation and IS

## 2024-08-05 NOTE — ANESTHESIA PREPROCEDURE EVALUATION
Procedure:  BYPASS GASTRIC  BRENDA-EN-Y LAPAROSCOPIC AND INTRAOPERATIVE EGD (Abdomen)    Relevant Problems   ANESTHESIA (within normal limits)      CARDIO   (+) HTN (hypertension)   (+) Murmur      ENDO   (+) Type 2 diabetes mellitus without complication, without long-term current use of insulin (HCC)      NEURO/PSYCH   (+) Recurrent major depressive disorder, in partial remission (HCC)        Physical Exam    Airway    Mallampati score: I  TM Distance: >3 FB  Neck ROM: full     Dental   No notable dental hx     Cardiovascular  Rhythm: regular, Rate: normal    Pulmonary   Breath sounds clear to auscultation    Other Findings  post-pubertal.      Anesthesia Plan  ASA Score- 3     Anesthesia Type- general with ASA Monitors.         Additional Monitors:     Airway Plan: ETT.           Plan Factors-Exercise tolerance (METS): >4 METS.    Chart reviewed.   Existing labs reviewed. Patient summary reviewed.    Patient is not a current smoker.              Induction- intravenous.    Postoperative Plan- Plan for postoperative opioid use. Planned trial extubation    Perioperative Resuscitation Plan - Level 1 - Full Code.       Informed Consent- Anesthetic plan and risks discussed with patient.  I personally reviewed this patient with the CRNA. Discussed and agreed on the Anesthesia Plan with the CRNA..

## 2024-08-05 NOTE — ANESTHESIA POSTPROCEDURE EVALUATION
Post-Op Assessment Note        Multimodal analgesia used between 6 hours prior to anesthesia start to PACU discharge     No anethesia notable event occurred.    Staff: CRNA               BP   129/75   Temp 98.5   Pulse 67   Resp 17   SpO2 98

## 2024-08-06 ENCOUNTER — TRANSITIONAL CARE MANAGEMENT (OUTPATIENT)
Dept: FAMILY MEDICINE CLINIC | Facility: CLINIC | Age: 34
End: 2024-08-06

## 2024-08-06 VITALS
BODY MASS INDEX: 46.77 KG/M2 | HEIGHT: 66 IN | OXYGEN SATURATION: 94 % | RESPIRATION RATE: 20 BRPM | WEIGHT: 291.01 LBS | SYSTOLIC BLOOD PRESSURE: 123 MMHG | HEART RATE: 51 BPM | TEMPERATURE: 97.7 F | DIASTOLIC BLOOD PRESSURE: 75 MMHG

## 2024-08-06 LAB
ANION GAP SERPL CALCULATED.3IONS-SCNC: 9 MMOL/L (ref 4–13)
BUN SERPL-MCNC: 10 MG/DL (ref 5–25)
CALCIUM SERPL-MCNC: 9 MG/DL (ref 8.4–10.2)
CHLORIDE SERPL-SCNC: 107 MMOL/L (ref 96–108)
CO2 SERPL-SCNC: 24 MMOL/L (ref 21–32)
CREAT SERPL-MCNC: 0.47 MG/DL (ref 0.6–1.3)
ERYTHROCYTE [DISTWIDTH] IN BLOOD BY AUTOMATED COUNT: 13.7 % (ref 11.6–15.1)
GFR SERPL CREATININE-BSD FRML MDRD: 127 ML/MIN/1.73SQ M
GLUCOSE SERPL-MCNC: 118 MG/DL (ref 65–140)
GLUCOSE SERPL-MCNC: 120 MG/DL (ref 65–140)
GLUCOSE SERPL-MCNC: 120 MG/DL (ref 65–140)
GLUCOSE SERPL-MCNC: 121 MG/DL (ref 65–140)
HCT VFR BLD AUTO: 40.2 % (ref 34.8–46.1)
HGB BLD-MCNC: 13.2 G/DL (ref 11.5–15.4)
MCH RBC QN AUTO: 28.2 PG (ref 26.8–34.3)
MCHC RBC AUTO-ENTMCNC: 32.8 G/DL (ref 31.4–37.4)
MCV RBC AUTO: 86 FL (ref 82–98)
PLATELET # BLD AUTO: 244 THOUSANDS/UL (ref 149–390)
PMV BLD AUTO: 11 FL (ref 8.9–12.7)
POTASSIUM SERPL-SCNC: 3.5 MMOL/L (ref 3.5–5.3)
RBC # BLD AUTO: 4.68 MILLION/UL (ref 3.81–5.12)
SODIUM SERPL-SCNC: 140 MMOL/L (ref 135–147)
WBC # BLD AUTO: 10.22 THOUSAND/UL (ref 4.31–10.16)

## 2024-08-06 PROCEDURE — 94762 N-INVAS EAR/PLS OXIMTRY CONT: CPT

## 2024-08-06 PROCEDURE — NC001 PR NO CHARGE: Performed by: PHYSICIAN ASSISTANT

## 2024-08-06 PROCEDURE — 80048 BASIC METABOLIC PNL TOTAL CA: CPT | Performed by: PHYSICIAN ASSISTANT

## 2024-08-06 PROCEDURE — 82948 REAGENT STRIP/BLOOD GLUCOSE: CPT

## 2024-08-06 PROCEDURE — 99024 POSTOP FOLLOW-UP VISIT: CPT | Performed by: PHYSICIAN ASSISTANT

## 2024-08-06 PROCEDURE — 85027 COMPLETE CBC AUTOMATED: CPT | Performed by: PHYSICIAN ASSISTANT

## 2024-08-06 RX ORDER — ACETAZOLAMIDE 250 MG/1
250 TABLET ORAL 2 TIMES DAILY
Start: 2024-08-06

## 2024-08-06 RX ADMIN — KETOROLAC TROMETHAMINE 15 MG: 30 INJECTION, SOLUTION INTRAMUSCULAR; INTRAVENOUS at 11:38

## 2024-08-06 RX ADMIN — KETOROLAC TROMETHAMINE 15 MG: 30 INJECTION, SOLUTION INTRAMUSCULAR; INTRAVENOUS at 05:40

## 2024-08-06 RX ADMIN — SIMETHICONE 80 MG: 80 TABLET, CHEWABLE ORAL at 09:08

## 2024-08-06 RX ADMIN — ACETAMINOPHEN 1000 MG: 10 INJECTION INTRAVENOUS at 05:40

## 2024-08-06 RX ADMIN — FAMOTIDINE 20 MG: 10 INJECTION, SOLUTION INTRAVENOUS at 09:08

## 2024-08-06 RX ADMIN — ACETAMINOPHEN 1000 MG: 10 INJECTION INTRAVENOUS at 00:08

## 2024-08-06 RX ADMIN — KETOROLAC TROMETHAMINE 15 MG: 30 INJECTION, SOLUTION INTRAMUSCULAR; INTRAVENOUS at 00:01

## 2024-08-06 RX ADMIN — ENOXAPARIN SODIUM 40 MG: 40 INJECTION SUBCUTANEOUS at 09:08

## 2024-08-06 RX ADMIN — SODIUM CHLORIDE, SODIUM LACTATE, POTASSIUM CHLORIDE, AND CALCIUM CHLORIDE 100 ML/HR: .6; .31; .03; .02 INJECTION, SOLUTION INTRAVENOUS at 00:01

## 2024-08-06 NOTE — ASSESSMENT & PLAN NOTE
Evident by bmi 51.55  S/p gastric bypass post op day 1.   Encourage ongoing diet modifications and regular exercise

## 2024-08-06 NOTE — PLAN OF CARE
Problem: PAIN - ADULT  Goal: Verbalizes/displays adequate comfort level or baseline comfort level  Description: Interventions:  - Encourage patient to monitor pain and request assistance  - Assess pain using appropriate pain scale  - Administer analgesics based on type and severity of pain and evaluate response  - Implement non-pharmacological measures as appropriate and evaluate response  - Consider cultural and social influences on pain and pain management  - Notify physician/advanced practitioner if interventions unsuccessful or patient reports new pain  Outcome: Progressing     Problem: INFECTION - ADULT  Goal: Absence or prevention of progression during hospitalization  Description: INTERVENTIONS:  - Assess and monitor for signs and symptoms of infection  - Monitor lab/diagnostic results  - Monitor all insertion sites, i.e. indwelling lines, tubes, and drains  - Monitor endotracheal if appropriate and nasal secretions for changes in amount and color  - [place] appropriate cooling/warming therapies per order  - Administer medications as ordered  - Instruct and encourage patient and family to use good hand hygiene technique  - Identify and instruct in appropriate isolation precautions for identified infection/condition  Outcome: Progressing  Goal: Absence of fever/infection during neutropenic period  Description: INTERVENTIONS:  - Monitor WBC    Outcome: Progressing     Problem: SAFETY ADULT  Goal: Patient will remain free of falls  Description: INTERVENTIONS:  - Educate patient/family on patient safety including physical limitations  - Instruct patient to call for assistance with activity   - Consult OT/PT to assist with strengthening/mobility   - Keep Call bell within reach  - Keep bed low and locked with side rails adjusted as appropriate  - Keep care items and personal belongings within reach  - Initiate and maintain comfort rounds  - Make Fall Risk Sign visible to staff  - Offer Toileting every 2 Hours,  in advance of need  - Initiate/Maintain bed alarm  - Obtain necessary fall risk management equipment: yellow socks  - Apply yellow socks and bracelet for high fall risk patients  - Consider moving patient to room near nurses station  Outcome: Progressing  Goal: Maintain or return to baseline ADL function  Description: INTERVENTIONS:  -  Assess patient's ability to carry out ADLs; assess patient's baseline for ADL function and identify physical deficits which impact ability to perform ADLs (bathing, care of mouth/teeth, toileting, grooming, dressing, etc.)  - Assess/evaluate cause of self-care deficits   - Assess range of motion  - Assess patient's mobility; develop plan if impaired  - Assess patient's need for assistive devices and provide as appropriate  - Encourage maximum independence but intervene and supervise when necessary  - Involve family in performance of ADLs  - Assess for home care needs following discharge   - Consider OT consult to assist with ADL evaluation and planning for discharge  - Provide patient education as appropriate  Outcome: Progressing  Goal: Maintains/Returns to pre admission functional level  Description: INTERVENTIONS:  - Perform AM-PAC 6 Click Basic Mobility/ Daily Activity assessment daily.  - Set and communicate daily mobility goal to care team and patient/family/caregiver.   - Collaborate with rehabilitation services on mobility goals if consulted  - Perform Range of Motion 2 times a day.  - Reposition patient every 2 hours.  - Dangle patient 2 times a day  - Stand patient 2 times a day  - Ambulate patient 2 times a day  - Out of bed to chair 2 times a day   - Out of bed for meals 2 times a day  - Out of bed for toileting  - Record patient progress and toleration of activity level   Outcome: Progressing

## 2024-08-06 NOTE — DISCHARGE SUMMARY
"  Discharge Summary - Kiana Kapoor 34 y.o. female MRN: [id number]    Unit/Bed#: -01 Encounter: [id number]      Pre-Operative Diagnosis: Pre-Op Diagnosis Codes:      * Morbid obesity (HCC) [E66.01]     * Diabetes mellitus (HCC) [E11.9]     * Polycystic ovaries [E28.2]     * Recurrent major depression in partial remission (HCC) [F33.41]    Post-Operative Diagnosis: Post-Op Diagnosis Codes:     * Morbid obesity (HCC) [E66.01]     * Diabetes mellitus (HCC) [E11.9]     * Polycystic ovaries [E28.2]     * Recurrent major depression in partial remission (HCC) [F33.41]    Procedures Performed:  Procedure(s):  BYPASS GASTRIC  BRENDA-EN-Y LAPAROSCOPIC    Surgeon: Raisa Parsons MD    See H & P for full details of admission and Operative Note for full details of operations performed.     Hospital Course:  Patient was admitted for a Laparoscopic Brenda-En-Y Gastric Bypass. Post operatively pain was controlled with oral analgesics and the patient is ambulating/micturating without difficulty. Vital signs and lab work were stable. The patient is tolerating clear liquid diet without nausea or vomiting. The patient is cleared for D/C by the surgeon on POD1.    Patient was seen and examined prior to discharge.      Provisions for Follow-Up Care:  See After Visit Summary for information related to follow-up care and home orders.      Disposition: Home, in stable condition. Patient should refer to \"Discharge Instructions\" for further information.    Planned Readmission: No    Discharge Medications:  See After Visit Summary for reconciled discharge medications provided to patient and family.      Post Operative instructions: Reviewed with patient and/or family.    This text is generated with voice recognition software. There may be translation, syntax,  or grammatical errors. If you have any questions, please contact the dictating provider.     Signature:   Dayami Gutierrez PA-C  Date: 8/6/2024 Time: 8:58 AM     "

## 2024-08-06 NOTE — ASSESSMENT & PLAN NOTE
Lab Results   Component Value Date    HGBA1C 5.7 (H) 03/02/2024       Recent Labs     08/05/24  1643 08/05/24  2155 08/06/24  0009 08/06/24  0710   POCGLU 151* 128 120 118         Blood Sugar Average: Last 72 hrs:  (P) [id number]  Last A1c showing tight control 3/2024 at 5.7  Will repeat A1c for completeness  BG elevated this admission   Continue SSI and blood glucose monitoring

## 2024-08-06 NOTE — PROGRESS NOTES
"Progress Note - Bariatric Surgery   Kiana Kapoor 34 y.o. female MRN: [id number]  Unit/Bed#: -Sathish Encounter: [id number]      Subjective/Objective     Subjective:   Overall feeling well. Tolerating liquid diet without nausea or vomiting, pain adequately controlled on oral pain medication, ambulating without assistance, voiding well, using incentive spirometer. Denies fevers, chills, sweats, SOB, CP, calf pain.    Objective:    /68   Pulse (!) 50   Temp 97.7 °F (36.5 °C)   Resp 20   Ht 5' 6\" (1.676 m)   Wt 132 kg (291 lb 0.1 oz)   LMP 08/14/2023 (Approximate) Comment: peiods are very irregular in pattern  SpO2 96%   BMI 46.97 kg/m²       Intake/Output Summary (Last 24 hours) at 8/6/2024 0853  Last data filed at 8/5/2024 1141  Gross per 24 hour   Intake 1100 ml   Output 20 ml   Net 1080 ml       Invasive Devices       Peripheral Intravenous Line  Duration             Peripheral IV 08/05/24 Left Antecubital 1 day    Peripheral IV 08/05/24 Left Hand 1 day                    ROS: 10-point system completed. All negative except see HPI.      Physical Exam    General Appearance:    Alert, cooperative, no distress, appears stated age   Head:    Normocephalic, without obvious abnormality, atraumatic   Lungs:     respirations unlabored   Heart:    Regular rate and rhythm   Abdomen:     Soft, appropriate tenderness, non distended, incisions clean, dry, and intact   Extremities:   Extremities normal, atraumatic, no cyanosis or edema                   Lab, Imaging and other studies:I have personally reviewed pertinent lab results.  , CBC:   Lab Results   Component Value Date    WBC 10.22 (H) 08/06/2024    HGB 13.2 08/06/2024    HCT 40.2 08/06/2024    MCV 86 08/06/2024     08/06/2024    RBC 4.68 08/06/2024    MCH 28.2 08/06/2024    MCHC 32.8 08/06/2024    RDW 13.7 08/06/2024    MPV 11.0 08/06/2024   , CMP:   Lab Results   Component Value Date    SODIUM 140 08/06/2024    K 3.5 08/06/2024     " 08/06/2024    CO2 24 08/06/2024    BUN 10 08/06/2024    CREATININE 0.47 (L) 08/06/2024    CALCIUM 9.0 08/06/2024    EGFR 127 08/06/2024        VTE Mechanical Prophylaxis: sequential compression device, lovenox    Assessment/Plan  35 yo F s/p Lap RYGB POD1 with stable post op course. Encourage PO fluids, ambulation, and incentive spirometry.  Will plan for D/C home today    DM - blood sugars well controlled - hold metformin and continue to monitor at home and if two readings over 200 then call PCP/Endo and f/u outpatient in 1 week    HTN - well controlled     BIH - Will hold diamox for now given large pills and high dose - has not been taking at home and she will call her neurologist today to confirm    Plan of care was discussed with patient and patient's nurse  Care plan discussed with Dr. Guerra.    Dispo: Continue bariatric clear liquid diet, ambulation, incentive spirometry.

## 2024-08-06 NOTE — PLAN OF CARE
Problem: PAIN - ADULT  Goal: Verbalizes/displays adequate comfort level or baseline comfort level  Description: Interventions:  - Encourage patient to monitor pain and request assistance  - Assess pain using appropriate pain scale  - Administer analgesics based on type and severity of pain and evaluate response  - Implement non-pharmacological measures as appropriate and evaluate response  - Consider cultural and social influences on pain and pain management  - Notify physician/advanced practitioner if interventions unsuccessful or patient reports new pain  8/6/2024 1239 by Lianne Bernard RN  Outcome: Progressing  8/6/2024 1038 by Lianne Bernard RN  Outcome: Progressing     Problem: INFECTION - ADULT  Goal: Absence or prevention of progression during hospitalization  Description: INTERVENTIONS:  - Assess and monitor for signs and symptoms of infection  - Monitor lab/diagnostic results  - Monitor all insertion sites, i.e. indwelling lines, tubes, and drains  - Monitor endotracheal if appropriate and nasal secretions for changes in amount and color  - [place] appropriate cooling/warming therapies per order  - Administer medications as ordered  - Instruct and encourage patient and family to use good hand hygiene technique  - Identify and instruct in appropriate isolation precautions for identified infection/condition  8/6/2024 1239 by Lianne Bernard RN  Outcome: Progressing  8/6/2024 1038 by Lianne Bernard RN  Outcome: Progressing  Goal: Absence of fever/infection during neutropenic period  Description: INTERVENTIONS:  - Monitor WBC    8/6/2024 1239 by Lianne Bernard RN  Outcome: Progressing  8/6/2024 1038 by Lianne Bernard RN  Outcome: Progressing     Problem: SAFETY ADULT  Goal: Patient will remain free of falls  Description: INTERVENTIONS:  - Educate patient/family on patient safety including physical limitations  - Instruct patient to call for assistance with activity   - Consult OT/PT to assist  with strengthening/mobility   - Keep Call bell within reach  - Keep bed low and locked with side rails adjusted as appropriate  - Keep care items and personal belongings within reach  - Initiate and maintain comfort rounds  - Make Fall Risk Sign visible to staff  - Offer Toileting every 2 Hours, in advance of need  - Initiate/Maintain bed alarm  - Obtain necessary fall risk management equipment:   - Apply yellow socks and bracelet for high fall risk patients  - Consider moving patient to room near nurses station  8/6/2024 1239 by Lianne Bernard RN  Outcome: Progressing  8/6/2024 1038 by Lianne Bernard RN  Outcome: Progressing  Goal: Maintain or return to baseline ADL function  Description: INTERVENTIONS:  -  Assess patient's ability to carry out ADLs; assess patient's baseline for ADL function and identify physical deficits which impact ability to perform ADLs (bathing, care of mouth/teeth, toileting, grooming, dressing, etc.)  - Assess/evaluate cause of self-care deficits   - Assess range of motion  - Assess patient's mobility; develop plan if impaired  - Assess patient's need for assistive devices and provide as appropriate  - Encourage maximum independence but intervene and supervise when necessary  - Involve family in performance of ADLs  - Assess for home care needs following discharge   - Consider OT consult to assist with ADL evaluation and planning for discharge  - Provide patient education as appropriate  8/6/2024 1239 by Lianne Bernard RN  Outcome: Progressing  8/6/2024 1038 by Lianne Bernard RN  Outcome: Progressing  Goal: Maintains/Returns to pre admission functional level  Description: INTERVENTIONS:  - Perform AM-PAC 6 Click Basic Mobility/ Daily Activity assessment daily.  - Set and communicate daily mobility goal to care team and patient/family/caregiver.   - Collaborate with rehabilitation services on mobility goals if consulted  - Perform Range of Motion 2 times a day.  - Reposition patient  every 2 hours.  - Dangle patient 2 times a day  - Stand patient 2 times a day  - Ambulate patient 2 times a day  - Out of bed to chair 2 times a day   - Out of bed for meals 2 times a day  - Out of bed for toileting  - Record patient progress and toleration of activity level   8/6/2024 1239 by Lianne Bernard RN  Outcome: Progressing  8/6/2024 1038 by Lianne Bernard RN  Outcome: Progressing     Problem: DISCHARGE PLANNING  Goal: Discharge to home or other facility with appropriate resources  Description: INTERVENTIONS:  - Identify barriers to discharge w/patient and caregiver  - Arrange for needed discharge resources and transportation as appropriate  - Identify discharge learning needs (meds, wound care, etc.)  - Arrange for interpretive services to assist at discharge as needed  - Refer to Case Management Department for coordinating discharge planning if the patient needs post-hospital services based on physician/advanced practitioner order or complex needs related to functional status, cognitive ability, or social support system  8/6/2024 1239 by Lianne Bernard RN  Outcome: Progressing  8/6/2024 1038 by Lianne Bernard RN  Outcome: Progressing     Problem: Knowledge Deficit  Goal: Patient/family/caregiver demonstrates understanding of disease process, treatment plan, medications, and discharge instructions  Description: Complete learning assessment and assess knowledge base.  Interventions:  - Provide teaching at level of understanding  - Provide teaching via preferred learning methods  8/6/2024 1239 by Lianne Bernard RN  Outcome: Progressing  8/6/2024 1038 by Lianne Bernard RN  Outcome: Progressing

## 2024-08-06 NOTE — PLAN OF CARE
Problem: PAIN - ADULT  Goal: Verbalizes/displays adequate comfort level or baseline comfort level  Description: Interventions:  - Encourage patient to monitor pain and request assistance  - Assess pain using appropriate pain scale  - Administer analgesics based on type and severity of pain and evaluate response  - Implement non-pharmacological measures as appropriate and evaluate response  - Consider cultural and social influences on pain and pain management  - Notify physician/advanced practitioner if interventions unsuccessful or patient reports new pain  Outcome: Progressing     Problem: INFECTION - ADULT  Goal: Absence or prevention of progression during hospitalization  Description: INTERVENTIONS:  - Assess and monitor for signs and symptoms of infection  - Monitor lab/diagnostic results  - Monitor all insertion sites, i.e. indwelling lines, tubes, and drains  - Monitor endotracheal if appropriate and nasal secretions for changes in amount and color  - [place] appropriate cooling/warming therapies per order  - Administer medications as ordered  - Instruct and encourage patient and family to use good hand hygiene technique  - Identify and instruct in appropriate isolation precautions for identified infection/condition  Outcome: Progressing  Goal: Absence of fever/infection during neutropenic period  Description: INTERVENTIONS:  - Monitor WBC    Outcome: Progressing     Problem: SAFETY ADULT  Goal: Patient will remain free of falls  Description: INTERVENTIONS:  - Educate patient/family on patient safety including physical limitations  - Instruct patient to call for assistance with activity   - Consult OT/PT to assist with strengthening/mobility   - Keep Call bell within reach  - Keep bed low and locked with side rails adjusted as appropriate  - Keep care items and personal belongings within reach  - Initiate and maintain comfort rounds  - Make Fall Risk Sign visible to staff  - Offer Toileting every 2 Hours,  in advance of need  - Initiate/Maintain bed alarm  - Obtain necessary fall risk management equipment:   - Apply yellow socks and bracelet for high fall risk patients  - Consider moving patient to room near nurses station  Outcome: Progressing  Goal: Maintain or return to baseline ADL function  Description: INTERVENTIONS:  -  Assess patient's ability to carry out ADLs; assess patient's baseline for ADL function and identify physical deficits which impact ability to perform ADLs (bathing, care of mouth/teeth, toileting, grooming, dressing, etc.)  - Assess/evaluate cause of self-care deficits   - Assess range of motion  - Assess patient's mobility; develop plan if impaired  - Assess patient's need for assistive devices and provide as appropriate  - Encourage maximum independence but intervene and supervise when necessary  - Involve family in performance of ADLs  - Assess for home care needs following discharge   - Consider OT consult to assist with ADL evaluation and planning for discharge  - Provide patient education as appropriate  Outcome: Progressing  Goal: Maintains/Returns to pre admission functional level  Description: INTERVENTIONS:  - Perform AM-PAC 6 Click Basic Mobility/ Daily Activity assessment daily.  - Set and communicate daily mobility goal to care team and patient/family/caregiver.   - Collaborate with rehabilitation services on mobility goals if consulted  - Perform Range of Motion 2 times a day.  - Reposition patient every 2 hours.  - Dangle patient 2 times a day  - Stand patient 2 times a day  - Ambulate patient 2 times a day  - Out of bed to chair 2 times a day   - Out of bed for meals 2 times a day  - Out of bed for toileting  - Record patient progress and toleration of activity level   Outcome: Progressing     Problem: DISCHARGE PLANNING  Goal: Discharge to home or other facility with appropriate resources  Description: INTERVENTIONS:  - Identify barriers to discharge w/patient and caregiver  -  Arrange for needed discharge resources and transportation as appropriate  - Identify discharge learning needs (meds, wound care, etc.)  - Arrange for interpretive services to assist at discharge as needed  - Refer to Case Management Department for coordinating discharge planning if the patient needs post-hospital services based on physician/advanced practitioner order or complex needs related to functional status, cognitive ability, or social support system  Outcome: Progressing     Problem: Knowledge Deficit  Goal: Patient/family/caregiver demonstrates understanding of disease process, treatment plan, medications, and discharge instructions  Description: Complete learning assessment and assess knowledge base.  Interventions:  - Provide teaching at level of understanding  - Provide teaching via preferred learning methods  Outcome: Progressing

## 2024-08-06 NOTE — UTILIZATION REVIEW
Initial Clinical Review    Elective surgical procedure  Age/Sex: 34 y.o. female  Surgery Date: 08/05/2024  Procedure: BYPASS GASTRIC BRENDA-EN-Y LAPAROSCOPIC   Anesthesia: General  Operative Findings: Negative leak test     POD#1 Progress Note: 35 yo F s/p Lap RYGB POD1 with stable post op course. Encourage PO fluids, ambulation, and incentive spirometry.   DM - blood sugars well controlled - hold metformin and continue to monitor at home and if two readings over 200 then call PCP/Endo and f/u outpatient in 1 week.   HTN - well controlled.    BIH - Will hold diamox for now given large pills and high dose - has not been taking at home and she will call her neurologist today to confirm.    Admission Orders: Date/Time/Statement:   Admission Orders (From admission, onward)       Ordered        08/05/24 1153  Inpatient Admission  Once                          Orders Placed This Encounter   Procedures    Inpatient Admission     Standing Status:   Standing     Number of Occurrences:   1     Order Specific Question:   Level of Care     Answer:   Med Surg [16]     Order Specific Question:   Bed Type     Answer:   Bariatric [1]     Order Specific Question:   Estimated length of stay     Answer:   Inpatient Only Surgery       Vital Signs (last 3 days) before discharge       Date/Time Temp Pulse Resp BP MAP (mmHg) SpO2 O2 Flow Rate (L/min) O2 Device Cardiac (WDL) Pain    08/06/24 1138 -- -- -- -- -- -- -- -- -- 5    08/06/24 11:05:10 -- 51 -- 123/75 91 94 % -- -- -- --    08/06/24 0845 -- -- -- -- -- -- -- -- -- 2    08/06/24 07:26:49 97.7 °F (36.5 °C) 50 20 129/68 88 96 % -- -- -- --    08/06/24 0713 -- -- -- -- -- 96 % -- None (Room air) -- --    08/06/24 0540 -- -- -- -- -- -- -- -- -- 3    08/06/24 0524 -- -- -- -- -- -- -- None (Room air) -- --    08/06/24 0052 -- -- -- -- -- -- -- None (Room air) -- --    08/06/24 0001 -- -- -- -- -- -- -- -- -- 7    08/05/24 23:39:55 97.4 °F (36.3 °C) 47 16 108/56 73 94 % -- -- -- --     08/05/24 2100 -- -- -- -- -- -- -- None (Room air) -- --    08/05/24 2017 -- -- -- -- -- -- -- None (Room air) -- --    08/05/24 1809 -- -- -- -- -- -- -- -- -- 5 08/05/24 16:19:18 98 °F (36.7 °C) 54 17 123/70 88 97 % -- -- -- --    08/05/24 1617 -- -- -- -- -- -- -- -- -- 4 08/05/24 1601 -- -- -- -- -- 95 % -- None (Room air) -- --    08/05/24 1600 98.6 °F (37 °C) -- -- -- -- -- -- -- -- --    08/05/24 15:53:57 98.6 °F (37 °C) 47 18 -- -- 95 % -- -- -- --    08/05/24 1500 -- 50 16 127/65 -- 94 % -- None (Room air) [facility] 4    08/05/24 1430 -- 46 13 128/79 -- 95 % -- None (Room air) WDL 5    08/05/24 1400 -- -- -- -- -- -- -- -- WDL --    08/05/24 1330 -- -- -- -- -- -- -- -- WDL --    08/05/24 1315 -- 47 14 127/74 -- 94 % -- None (Room air) WDL --    08/05/24 1300 -- 45 12 -- -- 98 % -- None (Room air) WDL 5    08/05/24 1245 -- 45 12 139/71 -- 95 % -- None (Room air) WDL 5    08/05/24 1230 -- 56 18 125/86 -- 96 % -- None (Room air) WDL 7    08/05/24 1215 -- 48 16 131/76 -- 95 % -- None (Room air) WDL 6    08/05/24 1200 -- 51 16 130/70 -- 100 % 5 L/min Simple mask WDL 8    08/05/24 1145 -- 68 18 127/74 -- 98 % 5 L/min Simple mask -- --    08/05/24 1139 98.5 °F (36.9 °C) 68 22 129/75 -- 97 % 5 L/min Simple mask WDL --    08/05/24 0821 -- -- -- -- -- -- -- -- -- Med Not Given for Pain - for MAR use only    08/05/24 0805 97.2 °F (36.2 °C) 60 18 135/65 -- 96 % -- None (Room air) -- No Pain          Weight (last 2 days) before discharge       Date/Time Weight    08/05/24 16:19:18 132 (291.01)    08/05/24 0805 132 (291.01)            Pertinent Labs/Diagnostic Test Results:   Radiology:  No orders to display     Cardiology:  No orders to display     GI:  No orders to display       Results from last 7 days   Lab Units 08/06/24  0523   WBC Thousand/uL 10.22*   HEMOGLOBIN g/dL 13.2   HEMATOCRIT % 40.2   PLATELETS Thousands/uL 244     Results from last 7 days   Lab Units 08/06/24  0523   SODIUM mmol/L 140   POTASSIUM mmol/L  3.5   CHLORIDE mmol/L 107   CO2 mmol/L 24   ANION GAP mmol/L 9   BUN mg/dL 10   CREATININE mg/dL 0.47*   EGFR ml/min/1.73sq m 127   CALCIUM mg/dL 9.0     Results from last 7 days   Lab Units 08/06/24  1100 08/06/24  0710 08/06/24  0009 08/05/24  2155 08/05/24  1643 08/05/24  1215 08/05/24  0812   POC GLUCOSE mg/dl 121 118 120 128 151* 151* 194*     Results from last 7 days   Lab Units 08/06/24  0523   GLUCOSE RANDOM mg/dL 120     Diet: Bariatric Clear liquid  Mobility: Ambulate  DVT Prophylaxis: Lovenox / Isak SCDs    Medications/Pain Control:   Scheduled Medications:  acetaminophen, 1,000 mg, Intravenous, Q8H MEGAN  enoxaparin, 40 mg, Subcutaneous, Daily  famotidine, 20 mg, Intravenous, Q12H MEGAN  insulin lispro, 2-12 Units, Subcutaneous, Q6H MEGAN  ketorolac, 15 mg, Intravenous, Q6H MEGAN  sertraline, 150 mg, Oral, Daily  simethicone, 80 mg, Oral, Q12H MEGAN      Continuous IV Infusions:  lactated ringers, 100 mL/hr, Intravenous, Continuous  lactated ringers infusion  Rate: 125 mL/hr Dose: 125 mL/hr  Freq: Continuous Route: IV  Indications of Use: IV Hydration  Last Dose: 125 mL/hr (08/05/24 1139)  Start: 08/05/24 0800 End: 08/05/24 1633    PRN Meds:  albuterol, 2 puff, Inhalation, Q4H PRN  albuterol, 2.5 mg, Nebulization, Q6H PRN  diphenhydrAMINE, 25 mg, Oral, HS PRN  HYDROmorphone, 1 mg, Intravenous, Q4H PRN  lactated ringers, 1,000 mL, Intravenous, Once PRN   And  lactated ringers, 1,000 mL, Intravenous, Once PRN  metoclopramide, 10 mg, Intravenous, Q6H PRN  ondansetron, 4 mg, Intravenous, Q6H PRN  oxyCODONE, 10 mg, Oral, Q4H PRN  oxyCODONE, 5 mg, Oral, Q4H PRN  phenol, 1 spray, Mouth/Throat, Q2H PRN  promethazine, 25 mg, Intramuscular, Q6H PRN  sodium chloride, 1,000 mL, Intravenous, Once PRN   And  sodium chloride, 1,000 mL, Intravenous, Once PRN        Network Utilization Review Department  ATTENTION: Please call with any questions or concerns to [phone] and carefully listen to the prompts so that you are  directed to the right person. All voicemails are confidential.   For Discharge needs, contact Care Management DC Support Team at [phone] opt. 2  Send all requests for admission clinical reviews, approved or denied determinations and any other requests to dedicated fax number below belonging to the [place] where the patient is receiving treatment. List of dedicated fax numbers for the Facilities:  FACILITY NAME UR FAX NUMBER   ADMISSION DENIALS (Administrative/Medical Necessity) [phone]   DISCHARGE SUPPORT TEAM (NETWORK) [phone]   PARENT CHILD HEALTH (Maternity/NICU/Pediatrics) [phone]   [facility] [phone]   [facility] [phone]   [facility] [phone]   [facility] [phone]   [facility] [phone]   [facility] [phone]   [facility] [phone]   [facility] [phone]   [facility] [phone]   [facility] [phone]   [facility] [phone]   [facility] [phone]

## 2024-08-07 ENCOUNTER — TELEPHONE (OUTPATIENT)
Dept: BARIATRICS | Facility: CLINIC | Age: 34
End: 2024-08-07

## 2024-08-07 ENCOUNTER — TELEPHONE (OUTPATIENT)
Age: 34
End: 2024-08-07

## 2024-08-07 NOTE — UTILIZATION REVIEW
NOTIFICATION OF INPATIENT ADMISSION   AUTHORIZATION REQUEST   SERVICING FACILITY:   [place] [address]  Tax ID: [id number]  NPI: [id number] ATTENDING PROVIDER:  Attending Name and NPI#: Raisa Parsons Md [[id number]]  Address: [address] [place] [address]  Phone: [phone]     ADMISSION INFORMATION:  Place of Service: Inpatient [facility]  Place of Service Code: 21  Inpatient Admission Date/Time: 8/5/24 11:53 AM  Discharge Date/Time: 8/6/2024 12:41 PM  Admitting Diagnosis Code/Description:  Morbid obesity (HCC) [E66.01]  Diabetes mellitus (HCC) [E11.9]  Polycystic ovaries [E28.2]  Recurrent major depression in partial remission (HCC) [F33.41]     UTILIZATION REVIEW CONTACT:  Valery Xiao, Utilization   Network Utilization Review Department  Phone: [phone]  Fax [phone]  Email: [email or web address]  Contact for approvals/pending authorizations, clinical reviews, and discharge.     PHYSICIAN ADVISORY SERVICES:  Medical Necessity Denial & Apeq-fe-Rgyv Review  Phone: [phone]  Fax: [phone]  Email: [email or web address]     DISCHARGE SUPPORT TEAM:  For Patients Discharge Needs & Updates  Phone: [phone] opt. 2 Fax: [phone]  Email: [email or web address]

## 2024-08-07 NOTE — TELEPHONE ENCOUNTER
Lou from [facility] called verifying if patient had the surgery. Advise Chanel surgery was done 08/05/2024. Lou also wantrd to confirmed fax number [phone]

## 2024-08-07 NOTE — TELEPHONE ENCOUNTER
Post op follow up phone call completed.  Pt is sipping liquids, using IS as instructed, reinforced importance of using IS to help prevent pneumonia. Ambulating about home without difficulty.  Minimal pain, not using oxycodone.  Reaffirmed examples of liquid diet over the next week.  Pt stated understanding about discharge instructions and medication adjustments.  Follow up appt with surgeon scheduled for next week.   Instructed to call with any additional questions or concerns.

## 2024-08-08 NOTE — UTILIZATION REVIEW
NOTIFICATION OF ADMISSION DISCHARGE   This is a Notification of Discharge from [facility]. Please be advised that this patient has been discharge from our facility. Below you will find the admission and discharge date and time including the patient’s disposition.   UTILIZATION REVIEW CONTACT:  Sarah Xiao  Utilization   Network Utilization Review Department  Phone: [phone] x carefully listen to the prompts. All voicemails are confidential.  Email: [email or web address]     ADMISSION INFORMATION  PRESENTATION DATE: 8/5/2024  7:47 AM  OBERVATION ADMISSION DATE: N/A  INPATIENT ADMISSION DATE: 8/5/24 11:53 AM   DISCHARGE DATE: 8/6/2024 12:41 PM   DISPOSITION:Home/Self Care    Network Utilization Review Department  ATTENTION: Please call with any questions or concerns to [phone] and carefully listen to the prompts so that you are directed to the right person. All voicemails are confidential.   For Discharge needs, contact Care Management DC Support Team at [phone] opt. 2  Send all requests for admission clinical reviews, approved or denied determinations and any other requests to dedicated fax number below belonging to the campus where the patient is receiving treatment. List of dedicated fax numbers for the Facilities:  FACILITY NAME UR FAX NUMBER   ADMISSION DENIALS (Administrative/Medical Necessity) [phone]   DISCHARGE SUPPORT TEAM ([facility]) [phone]   PARENT CHILD HEALTH (Maternity/NICU/Pediatrics) [phone]   [facility] [phone]   [facility] [phone]   [facility] [phone]   [facility] [phone]   [facility] [phone]   [facility] [phone]   [facility] [phone]   [facility]  [phone]   [facility] [phone]   [facility] [phone]   [facility] [phone]   [facility] [phone]

## 2024-08-13 ENCOUNTER — OFFICE VISIT (OUTPATIENT)
Dept: FAMILY MEDICINE CLINIC | Facility: CLINIC | Age: 34
End: 2024-08-13
Payer: COMMERCIAL

## 2024-08-13 VITALS
SYSTOLIC BLOOD PRESSURE: 120 MMHG | TEMPERATURE: 97 F | DIASTOLIC BLOOD PRESSURE: 80 MMHG | BODY MASS INDEX: 45.8 KG/M2 | HEART RATE: 64 BPM | HEIGHT: 66 IN | RESPIRATION RATE: 16 BRPM | WEIGHT: 285 LBS | OXYGEN SATURATION: 97 %

## 2024-08-13 DIAGNOSIS — E11.9 TYPE 2 DIABETES MELLITUS WITHOUT COMPLICATION, WITHOUT LONG-TERM CURRENT USE OF INSULIN (HCC): ICD-10-CM

## 2024-08-13 DIAGNOSIS — I10 HYPERTENSION, UNSPECIFIED TYPE: ICD-10-CM

## 2024-08-13 DIAGNOSIS — Z98.84 HISTORY OF ROUX-EN-Y GASTRIC BYPASS: ICD-10-CM

## 2024-08-13 DIAGNOSIS — Z76.89 ENCOUNTER FOR SUPPORT AND COORDINATION OF TRANSITION OF CARE: Primary | ICD-10-CM

## 2024-08-13 LAB
CREAT UR-MCNC: 163.3 MG/DL
MICROALBUMIN UR-MCNC: 64.2 MG/L
MICROALBUMIN/CREAT 24H UR: 39 MG/G CREATININE (ref 0–30)
SL AMB POCT HEMOGLOBIN AIC: 5.2 (ref ?–6.5)

## 2024-08-13 PROCEDURE — 83036 HEMOGLOBIN GLYCOSYLATED A1C: CPT | Performed by: NURSE PRACTITIONER

## 2024-08-13 PROCEDURE — 82043 UR ALBUMIN QUANTITATIVE: CPT | Performed by: NURSE PRACTITIONER

## 2024-08-13 PROCEDURE — 99496 TRANSJ CARE MGMT HIGH F2F 7D: CPT | Performed by: NURSE PRACTITIONER

## 2024-08-13 PROCEDURE — 82570 ASSAY OF URINE CREATININE: CPT | Performed by: NURSE PRACTITIONER

## 2024-08-13 RX ORDER — SUMATRIPTAN 5 MG/1
SPRAY NASAL
COMMUNITY
Start: 2024-08-08

## 2024-08-13 NOTE — PROGRESS NOTES
Transition of Care Visit  Name: Kiana Kapoor      : 1990      MRN: [id number]  Encounter Provider: CONSTANTIN Farah  Encounter Date: 2024   Encounter department: [facility] [address]    Assessment & Plan   1. Encounter for support and coordination of transition of care  2. History of Emmanuel-en-Y gastric bypass  Assessment & Plan:  Patient completed .  Patient currently on puréed diet.  Denies complications following surgery.  Has follow-up with bariatrics this week.  Discussed monitoring for postop complications, such as shortness of breath, leg swelling, chest pain, palpitations, rigid/firm abdomen, pain.  To seek immediate care for concerning symptoms.  Advised to keep follow-up appointments as scheduled.  To continue on current medications as prescribed.  3. Type 2 diabetes mellitus without complication, without long-term current use of insulin (HCC)  Assessment & Plan:  A1c controlled.  Patient would like to stop Metformin.  Advised to continue with weight management and diet modifications.  Will trial being off Metformin, recheck labs in 3 months.  To follow-up with PCP in 3 months.    Lab Results   Component Value Date    HGBA1C 5.2 2024     Orders:  -     POCT hemoglobin A1c  -     Albumin / creatinine urine ratio; Future  -     Albumin / creatinine urine ratio  -     Vitamin D 25 hydroxy; Future; Expected date: 2024  -     TSH, 3rd generation with Free T4 reflex; Future; Expected date: 2024  -     Hemoglobin A1C; Future; Expected date: 2024  -     Comprehensive metabolic panel; Future; Expected date: 2024  -     CBC and differential; Future; Expected date: 2024  -     Lipid panel; Future; Expected date: 2024  4. Hypertension, unspecified type      Depression Screening and Follow-up Plan: Patient's depression screening was positive with a PHQ-9 score of 5. Patient assessed for underlying major depression.  Brief counseling provided and recommend additional follow-up/re-evaluation next office visit. Patient declines further evaluation by mental health professional and/or medications. Brief counseling provided. Will re-evaluate at next office visit.         History of Present Illness     Transitional Care Management Review:   Kiana Kapoor is a 34 y.o. female here for TCM follow up.     During the TCM phone call patient stated:  TCM Call       Date and time call was made  8/6/2024  2:52 PM    Hospital care reviewed  Records reviewed    Patient was hospitialized at  [facility]    Date of Admission  08/05/24    Date of discharge  08/06/24    Diagnosis  Morbid (severe) obesity due to excess calories    Disposition  Home    Current Symptoms  None          TCM Call       Post hospital issues  None    Should patient be enrolled in anticoag monitoring?  No    Scheduled for follow up?  Yes    Did you obtain your prescribed medications  Yes    Do you need help managing your prescriptions or medications  No    Is transportation to your appointment needed  No    I have advised the patient to call PCP with any new or worsening symptoms  Samara Landon MA    Living Arrangements  Alone    Support System  None    Are you recieving any outpatient services  No    Are you recieving home care services  No    Are you using any community resources  No    Current waiver services  No    Have you fallen in the last 12 months  No    Interperter language line needed  Yes    Counseling  Patient    Counseling topics  Activities of daily living; Importance of RX compliance          Patient presents to the office for evaluation after recent hospitalization.  Hospitalized 8/5 - 8/6 for bariatric surgery.  Had Laparoscopic Emmanuel-En-Y Gastric Bypass.  Per patient, surgery was uncomplicated.  Ambulated 1st day post-op.  Moving bowels, currently on puréed diet  Denies fever, chills, abdominal pain, SOB, chest pain.      Review of Systems  "  Constitutional:  Negative for chills and fever.   HENT:  Negative for sore throat and trouble swallowing.    Eyes:  Negative for photophobia and visual disturbance.   Respiratory:  Negative for cough, chest tightness and shortness of breath.    Cardiovascular:  Negative for chest pain, palpitations and leg swelling.   Gastrointestinal:  Negative for abdominal pain, blood in stool, constipation, nausea and vomiting.   Genitourinary:  Negative for decreased urine volume and hematuria.   Musculoskeletal:  Negative for arthralgias and myalgias.   Neurological:  Negative for dizziness, weakness, light-headedness and headaches.   Hematological:  Does not bruise/bleed easily.   Psychiatric/Behavioral:  Negative for agitation, dysphoric mood and sleep disturbance. The patient is not nervous/anxious.      Objective     /80   Pulse 64   Temp (!) 97 °F (36.1 °C)   Resp 16   Ht 5' 6\" (1.676 m)   Wt 129 kg (285 lb)   LMP 08/14/2023 (Approximate) Comment: peiods are very irregular in pattern  SpO2 97%   BMI 46.00 kg/m²     Physical Exam  Vitals reviewed.   Constitutional:       General: She is not in acute distress.     Appearance: She is obese. She is not ill-appearing.   HENT:      Head: Normocephalic and atraumatic.      Right Ear: External ear normal.      Left Ear: External ear normal.      Nose: Nose normal.      Mouth/Throat:      Mouth: Mucous membranes are moist.      Pharynx: Oropharynx is clear.   Eyes:      Conjunctiva/sclera: Conjunctivae normal.      Pupils: Pupils are equal, round, and reactive to light.   Cardiovascular:      Rate and Rhythm: Normal rate and regular rhythm.      Pulses: Normal pulses. no weak pulses.           Dorsalis pedis pulses are 2+ on the right side and 2+ on the left side.      Heart sounds: Normal heart sounds. No murmur heard.  Pulmonary:      Effort: Pulmonary effort is normal.      Breath sounds: Normal breath sounds.   Abdominal:      General: Abdomen is protuberant. " Bowel sounds are decreased.      Palpations: Abdomen is soft. There is no fluid wave.      Tenderness: There is no abdominal tenderness.      Comments: 6 surgical incision marks noted on abdominal wall.  No s/s infection noted   Musculoskeletal:         General: Normal range of motion.      Cervical back: Normal range of motion and neck supple.      Right lower leg: No edema.      Left lower leg: No edema.   Feet:      Right foot:      Skin integrity: No ulcer, skin breakdown, erythema, warmth, callus or dry skin.      Left foot:      Skin integrity: No ulcer, skin breakdown, erythema, warmth, callus or dry skin.   Lymphadenopathy:      Cervical: No cervical adenopathy.   Skin:     General: Skin is warm and dry.   Neurological:      General: No focal deficit present.      Mental Status: She is alert and oriented to person, place, and time.   Psychiatric:         Mood and Affect: Mood normal.         Behavior: Behavior normal.         Patient's shoes and socks removed.    Right Foot/Ankle   Right Foot Inspection  Skin Exam: skin normal and skin intact. No dry skin, no warmth, no callus, no erythema, no maceration, no abnormal color, no pre-ulcer, no ulcer and no callus.     Toe Exam: ROM and strength within normal limits.     Sensory   Monofilament testing: intact    Vascular  Capillary refills: < 3 seconds  The right DP pulse is 2+.     Left Foot/Ankle  Left Foot Inspection  Skin Exam: skin normal and skin intact. No dry skin, no warmth, no erythema, no maceration, normal color, no pre-ulcer, no ulcer and no callus.     Toe Exam: ROM and strength within normal limits.     Sensory   Monofilament testing: intact    Vascular  Capillary refills: < 3 seconds  The left DP pulse is 2+.     Assign Risk Category  No deformity present  No loss of protective sensation  No weak pulses  Risk: 0       Medications have been reviewed by provider in current encounter    Administrative Statements

## 2024-08-13 NOTE — ASSESSMENT & PLAN NOTE
Patient completed 8/5.  Patient currently on puréed diet.  Denies complications following surgery.  Has follow-up with bariatrics this week.  Discussed monitoring for postop complications, such as shortness of breath, leg swelling, chest pain, palpitations, rigid/firm abdomen, pain.  To seek immediate care for concerning symptoms.  Advised to keep follow-up appointments as scheduled.  To continue on current medications as prescribed.

## 2024-08-13 NOTE — ASSESSMENT & PLAN NOTE
A1c controlled.  Patient would like to stop Metformin.  Advised to continue with weight management and diet modifications.  Will trial being off Metformin, recheck labs in 3 months.  To follow-up with PCP in 3 months.    Lab Results   Component Value Date    HGBA1C 5.2 08/13/2024

## 2024-08-15 ENCOUNTER — CLINICAL SUPPORT (OUTPATIENT)
Dept: BARIATRICS | Facility: CLINIC | Age: 34
End: 2024-08-15

## 2024-08-15 ENCOUNTER — OFFICE VISIT (OUTPATIENT)
Dept: BARIATRICS | Facility: CLINIC | Age: 34
End: 2024-08-15

## 2024-08-15 VITALS
TEMPERATURE: 97.6 F | SYSTOLIC BLOOD PRESSURE: 130 MMHG | HEART RATE: 75 BPM | HEIGHT: 63 IN | BODY MASS INDEX: 51.3 KG/M2 | RESPIRATION RATE: 16 BRPM | DIASTOLIC BLOOD PRESSURE: 84 MMHG

## 2024-08-15 DIAGNOSIS — E11.9 TYPE 2 DIABETES MELLITUS WITHOUT COMPLICATION, WITHOUT LONG-TERM CURRENT USE OF INSULIN (HCC): ICD-10-CM

## 2024-08-15 DIAGNOSIS — E66.01 MORBID (SEVERE) OBESITY DUE TO EXCESS CALORIES (HCC): ICD-10-CM

## 2024-08-15 DIAGNOSIS — I10 HYPERTENSION, UNSPECIFIED TYPE: ICD-10-CM

## 2024-08-15 DIAGNOSIS — K91.2 POSTSURGICAL MALABSORPTION: ICD-10-CM

## 2024-08-15 DIAGNOSIS — K91.2 POSTSURGICAL MALABSORPTION: Primary | ICD-10-CM

## 2024-08-15 DIAGNOSIS — Z01.818 ENCOUNTER FOR OTHER PREPROCEDURAL EXAMINATION: Primary | ICD-10-CM

## 2024-08-15 DIAGNOSIS — E66.01 CLASS 3 SEVERE OBESITY DUE TO EXCESS CALORIES WITH SERIOUS COMORBIDITY AND BODY MASS INDEX (BMI) OF 50.0 TO 59.9 IN ADULT (HCC): ICD-10-CM

## 2024-08-15 PROCEDURE — RECHECK

## 2024-08-15 PROCEDURE — 99024 POSTOP FOLLOW-UP VISIT: CPT | Performed by: SURGERY

## 2024-08-15 RX ORDER — IBUPROFEN 200 MG
1 CAPSULE ORAL DAILY
COMMUNITY

## 2024-08-15 NOTE — PROGRESS NOTES
FIRST POST-OPERATIVE VISIT - BARIATRIC SURGERY  Kaina Kapoor 34 y.o. female MRN: [id number]  Unit/Bed#:  Encounter: [id number]      HPI:  Kiana Kapoor is a 34 y.o. female who presents for the 1st postoperative visit following a laparoscopic Brenda-en-Y gastric bypass.  She is tolerating a diet and she denies pain.    Amount of Oxycodone 5 mg tablets taken - (o tablets; pain score - 0)        Historical Information   Past Medical History:   Diagnosis Date    Allergic 2005    dogs, dust mites, shrimp    Anxiety 2000    Asthma     Asthma 02/01/2022    Depression     Well controlled with Zoloft    Diabetes mellitus (HCC)     Eating disorder 2007    BED    Encounter for gynecological examination without abnormal finding 11/29/2021    GERD (gastroesophageal reflux disease)     Headache(784.0)     Hidradenitis suppurativa 03/25/2022    Irritable bowel syndrome     Morbid obesity with body mass index (BMI) of 60.0 to 69.9 in adult (HCC)     Multiple gastric ulcers     Obesity     Pre-conception counseling 01/31/2022    Getting  August 2022, desires pregnancy Hx PCOS, ovarian drilling Weight reduction advised by THANIA Quintero -Check A1C Taking multivitamin (confirm folate). S/p COVID vax (no booster yet, due March 2022)     Past Surgical History:   Procedure Laterality Date    DILATION AND CURETTAGE OF UTERUS      endometrial thickening    EGD AND COLONOSCOPY  2013    FL LUMBAR PUNCTURE DIAGNOSTIC  4/8/2024    OVARY SURGERY      ovarian drilling     TX LAPS GSTR RSTCV PX W/BYP BRENDA-EN-Y LIMB <150 CM N/A 8/5/2024    Procedure: BYPASS GASTRIC  BRENDA-EN-Y LAPAROSCOPIC;  Surgeon: Raisa Parsons MD;  Location: [facility];  Service: Bariatrics    WISDOM TOOTH EXTRACTION       Social History   Social History     Substance and Sexual Activity   Alcohol Use Not Currently     Social History     Substance and Sexual Activity   Drug Use Yes    Types: Marijuana    Comment: medical marijuana- last smoked 8/3/24     Social  "History     Tobacco Use   Smoking Status Former    Current packs/day: 1.00    Average packs/day: 1 pack/day for 5.0 years (5.0 ttl pk-yrs)    Types: Cigarettes    Passive exposure: Never   Smokeless Tobacco Never     Family History: non-contributory    Meds/Allergies   all medications and allergies reviewed  Allergies   Allergen Reactions    Iodine - Food Allergy GI Intolerance    Mite (D. Farinae) Eye Swelling and Itching    Red Dye - Food Allergy Rash       Objective     Current Vitals:   Blood Pressure: 130/84 (08/15/24 0954)  Pulse: 75 (08/15/24 0954)  Temperature: 97.6 °F (36.4 °C) (08/15/24 0954)  Temp Source: Tympanic (08/15/24 0954)  Respirations: 16 (08/15/24 0954)  Height: 5' 2.5\" (158.8 cm) (08/15/24 0954)     Invasive Devices       None                   Physical Exam  Constitutional:       Appearance: Normal appearance.   Cardiovascular:      Rate and Rhythm: Normal rate.   Pulmonary:      Effort: Pulmonary effort is normal. No respiratory distress.   Abdominal:      General: Abdomen is flat. There is no distension.      Palpations: Abdomen is soft.      Tenderness: There is no abdominal tenderness.      Comments: Wounds clean/dry/intact without surrounding erythema   Neurological:      Mental Status: She is alert.           Assessment/Plan :    Patient is presenting for the first postoperative visit, patient hospital stay was uneventful without any complications, patient is doing well, has no complaints, is taking vitamins as instructed, currently tolerating the blenderized diet, will advance to soft diet.     Patient will also be meeting with our dietician today to review vitamin and mineral supplements and also go over diet and emphasize postoperative commitment and compliance. The patient was also instructed to start exercising on a regular basis. However, I recommended no heavy lifting, or weight exercises for another 2 weeks. F/U in 4 weeks. Patient was instructed to call if develops nausea, " vomiting, fever or chills.

## 2024-08-15 NOTE — PROGRESS NOTES
Weight Management Nutrition Education    Diagnosis: Obesity    Bariatric Surgeon: Dr. Parsons    Surgery: Gastric Bypass Laparoscopic  8/5/2024    Class: first post op note    Topics discussed today include:     fluid goals post op, protein goals post op, constipation, chew food well, exercise, avoidance of alcohol, PPI use, diet progression, hypoglycemia, dumping syndrome, protein supplems, vitamin/mineral supplements, calcium supplements, additional vitamin B12, iron supplements, and fat soluble vitamins     Patient was able to verbalize basic diet (protein, fluid, vitamin and mineral) recommendations and possible nutrition-related complications. Yes   Protein and Fluids adequate  Protein Drinks: 2 Premier, Fusion, Bariatric Advantage  Fluids:30-40 oz water  SF jello plus shakes  Started puree diet and tolerating: Sweet potato, soups,   (did not tolerate ricotta)  Measuring 1/4 c:Yes  Following 30/60 rule: Yes  Eating slow and sipping fluids: Yes  Started chewable multivitamin and calcium: Bariatric Advantage Multi and Fusion calcium chews  To start soft diet next week

## 2024-08-15 NOTE — LETTER
August 15, 2024     Rogelio Hough MD  [address]  [facility] [address]    Patient: Kiana Kapoor   YOB: 1990   Date of Visit: 8/15/2024       Dear Dr. Hough:    Thank you for referring Kiana Kapoor to me for metabolic and bariatric surgery. Below are my notes for this postoperative visit.    If you have questions, please do not hesitate to call me. I look forward to following your patient along with you.         Sincerely,        Raisa Parsons MD        CC: No Recipients    Raisa Parsons MD  8/15/2024 10:21 AM  Sign when Signing Visit  FIRST POST-OPERATIVE VISIT - BARIATRIC SURGERY  Kiana Kapoor 34 y.o. female MRN: [id number]  Unit/Bed#:  Encounter: [id number]      HPI:  Kiana Kapoor is a 34 y.o. female who presents for the 1st postoperative visit following a laparoscopic Emmanuel-en-Y gastric bypass.  She is tolerating a diet and she denies pain.    Amount of Oxycodone 5 mg tablets taken - (o tablets; pain score - 0)        Historical Information  Past Medical History:   Diagnosis Date   • Allergic 2005    dogs, dust mites, shrimp   • Anxiety 2000   • Asthma    • Asthma 02/01/2022   • Depression     Well controlled with Zoloft   • Diabetes mellitus (HCC)    • Eating disorder 2007    BED   • Encounter for gynecological examination without abnormal finding 11/29/2021   • GERD (gastroesophageal reflux disease)    • Headache(784.0)    • Hidradenitis suppurativa 03/25/2022   • Irritable bowel syndrome    • Morbid obesity with body mass index (BMI) of 60.0 to 69.9 in adult (HCC)    • Multiple gastric ulcers    • Obesity    • Pre-conception counseling 01/31/2022    Getting  August 2022, desires pregnancy Hx PCOS, ovarian drilling Weight reduction advised by THANIA Quintero -Check A1C Taking multivitamin (confirm folate). S/p COVID vax (no booster yet, due March 2022)     Past Surgical History:   Procedure Laterality Date   • DILATION AND CURETTAGE OF UTERUS       "endometrial thickening   • EGD AND COLONOSCOPY  2013   • FL LUMBAR PUNCTURE DIAGNOSTIC  4/8/2024   • OVARY SURGERY      ovarian drilling    • OR LAPS GSTR RSTCV PX W/BYP BRENDA-EN-Y LIMB <150 CM N/A 8/5/2024    Procedure: BYPASS GASTRIC  BRENDA-EN-Y LAPAROSCOPIC;  Surgeon: Raisa Parsons MD;  Location: [facility];  Service: Bariatrics   • WISDOM TOOTH EXTRACTION       Social History  Social History     Substance and Sexual Activity   Alcohol Use Not Currently     Social History     Substance and Sexual Activity   Drug Use Yes   • Types: Marijuana    Comment: medical marijuana- last smoked 8/3/24     Social History     Tobacco Use   Smoking Status Former   • Current packs/day: 1.00   • Average packs/day: 1 pack/day for 5.0 years (5.0 ttl pk-yrs)   • Types: Cigarettes   • Passive exposure: Never   Smokeless Tobacco Never     Family History: non-contributory    Meds/Allergies  all medications and allergies reviewed  Allergies   Allergen Reactions   • Iodine - Food Allergy GI Intolerance   • Mite (D. Farinae) Eye Swelling and Itching   • Red Dye - Food Allergy Rash       Objective    Current Vitals:   Blood Pressure: 130/84 (08/15/24 0954)  Pulse: 75 (08/15/24 0954)  Temperature: 97.6 °F (36.4 °C) (08/15/24 0954)  Temp Source: Tympanic (08/15/24 0954)  Respirations: 16 (08/15/24 0954)  Height: 5' 2.5\" (158.8 cm) (08/15/24 0954)     Invasive Devices       None                   Physical Exam  Constitutional:       Appearance: Normal appearance.   Cardiovascular:      Rate and Rhythm: Normal rate.   Pulmonary:      Effort: Pulmonary effort is normal. No respiratory distress.   Abdominal:      General: Abdomen is flat. There is no distension.      Palpations: Abdomen is soft.      Tenderness: There is no abdominal tenderness.      Comments: Wounds clean/dry/intact without surrounding erythema   Neurological:      Mental Status: She is alert.           Assessment/Plan :    Patient is presenting for the first postoperative " visit, patient hospital stay was uneventful without any complications, patient is doing well, has no complaints, is taking vitamins as instructed, currently tolerating the blenderized diet, will advance to soft diet.     Patient will also be meeting with our dietician today to review vitamin and mineral supplements and also go over diet and emphasize postoperative commitment and compliance. The patient was also instructed to start exercising on a regular basis. However, I recommended no heavy lifting, or weight exercises for another 2 weeks. F/U in 4 weeks. Patient was instructed to call if develops nausea, vomiting, fever or chills.

## 2024-08-20 ENCOUNTER — OFFICE VISIT (OUTPATIENT)
Dept: OBGYN CLINIC | Facility: CLINIC | Age: 34
End: 2024-08-20
Payer: COMMERCIAL

## 2024-08-20 VITALS
HEIGHT: 62 IN | DIASTOLIC BLOOD PRESSURE: 70 MMHG | SYSTOLIC BLOOD PRESSURE: 122 MMHG | BODY MASS INDEX: 51.89 KG/M2 | WEIGHT: 282 LBS

## 2024-08-20 DIAGNOSIS — N89.8 VAGINAL ITCHING: ICD-10-CM

## 2024-08-20 DIAGNOSIS — L30.9 VULVAR DERMATITIS: ICD-10-CM

## 2024-08-20 DIAGNOSIS — N94.9 VAGINAL BURNING: Primary | ICD-10-CM

## 2024-08-20 DIAGNOSIS — B37.9 YEAST INFECTION: ICD-10-CM

## 2024-08-20 LAB
BV WHIFF TEST VAG QL: ABNORMAL
CLUE CELLS SPEC QL WET PREP: ABNORMAL
PH SMN: 4 [PH]
T VAGINALIS VAG QL WET PREP: ABNORMAL
YEAST VAG QL WET PREP: ABNORMAL

## 2024-08-20 PROCEDURE — 99213 OFFICE O/P EST LOW 20 MIN: CPT | Performed by: OBSTETRICS & GYNECOLOGY

## 2024-08-20 PROCEDURE — 87210 SMEAR WET MOUNT SALINE/INK: CPT | Performed by: OBSTETRICS & GYNECOLOGY

## 2024-08-20 RX ORDER — FLUCONAZOLE 150 MG/1
TABLET ORAL
Qty: 2 TABLET | Refills: 0 | Status: SHIPPED | OUTPATIENT
Start: 2024-08-20 | End: 2024-08-23

## 2024-08-20 RX ORDER — CLOBETASOL PROPIONATE 0.5 MG/G
OINTMENT TOPICAL 2 TIMES DAILY
Qty: 30 G | Refills: 0 | Status: SHIPPED | OUTPATIENT
Start: 2024-08-20

## 2024-08-20 NOTE — PATIENT INSTRUCTIONS
Patient Education     Vaginal Yeast Infection, Adult ED   General Information   You came to the Emergency Department (ED) for a vaginal yeast infection. It is normal to have yeast in the vagina. But certain things can make yeast grow more than it should. This includes some medicines like antibiotics, stress, or other factors. A yeast infection can cause vaginal itching, burning, and more vaginal discharge. You may need a pill or medicine that you put into your vagina to treat a yeast infection.  What care is needed at home?   Call your regular doctor to let them know you were in the ED. Make a follow-up appointment if you were told to.  Practice good hygiene. Use clear water to wash the skin outside your vagina. Do not wash inside the vagina. Take showers instead of baths. Avoid bubble baths. Pat your skin dry with a clean towel.  Keep moist areas of your body clean, cool, and dry.  Avoid products that may irritate your skin. Do not use douches, feminine sprays, pads, body wash, or bubble bath.  Wear cotton underwear. Avoid tight-fitting pants, leggings, or shorts.  Change your wet bathing suit or damp workout clothes as soon as possible.  When do I need to get emergency help?   Return to the ED if:   You notice bleeding with the yeast infection, and you are not having your period.  You develop pain in your genital or pelvic area during sexual activity.  You have a fever of 100.4°F (38°C) or higher or chills.  When do I need to call the doctor?   Your discharge continues after you have finished the treatment.  You have new or worsening symptoms.  Last Reviewed Date   2021-04-09  Consumer Information Use and Disclaimer   This generalized information is a limited summary of diagnosis, treatment, and/or medication information. It is not meant to be comprehensive and should be used as a tool to help the user understand and/or assess potential diagnostic and treatment options. It does NOT include all information about  conditions, treatments, medications, side effects, or risks that may apply to a specific patient. It is not intended to be medical advice or a substitute for the medical advice, diagnosis, or treatment of a health care provider based on the health care provider's examination and assessment of a patient’s specific and unique circumstances. Patients must speak with a health care provider for complete information about their health, medical questions, and treatment options, including any risks or benefits regarding use of medications. This information does not endorse any treatments or medications as safe, effective, or approved for treating a specific patient. Up[employer] Inc[employer] and its affiliates disclaim any warranty or liability relating to this information or the use thereof. The use of this information is governed by the Terms of Use, available at https://www.[employer].com/en/know/clinical-effectiveness-terms   Copyright   Copyright © 2024 UpTo[employer], Inc. and its affiliates and/or licensors. All rights reserved.  Patient Education     Contact dermatitis   The Basics   Written by the doctors and editors at [employer]   What is dermatitis? -- Dermatitis is a type of skin rash that can happen after your skin touches something that irritates it or something you are allergic to.  Things that irritate the skin can be found in products that you use every day, such as soaps or cleansers. Some of the things that can cause skin allergies include:   Certain medicines, perfumes, or cosmetics   The metal in some kinds of jewelry   Plants, such as poison ivy and poison oak  Sometimes, you can develop a rash the first time you touch something. But it is also possible to get a rash from something that you have used before without any problems.  What other symptoms should I watch for? -- If you have a rash, your skin might be dry, itchy, or cracked (picture 1). In people with light skin, the rash is often red. In people with darker  skin, it might appear purple, brown, gray, or black (picture 2). If your rash is caused by an allergy, you might also have some swelling or blisters where you have the rash.  Severe symptoms include:   Pain   Widespread swelling   Blisters, oozing, or crusting of the skin  Is there anything I can do on my own? -- Yes. You can:   Avoid using or touching whatever might have caused your rash.   Protect your skin from anything that might irritate it or cause an allergy. For example, wear gloves if you need to work with harsh soaps.   Use cool or warm water, not hot, for baths and showers. You can also try a special kind of bath called an oatmeal bath.   Try using soothing skin products to help with the itching and discomfort. Examples include thick moisturizing cream or petroleum jelly. Put this on your skin right after you get out of the bath or shower and after washing your hands.   Avoid scratching your skin. It might help to:   Wear cotton gloves at night.   Keep your nails short and clean.   Cover the parts of your skin that itch.  How are skin rashes treated? -- Your doctor might prescribe different treatments or medicines to help your rash heal. These can include:   Steroid creams and ointments - These go on the skin, and they relieve itching and redness.   Steroid pills - You might need to take these for a short time if your rash is severe. But your doctor or nurse will want to take you off of the steroid pills as soon as possible. Even though these medicines help, they can also cause problems of their own.   Wet or damp dressings - These can be helpful for skin that is crusting or oozing. To use a wet or damp dressing, you need to wear 2 layers of clothing. First, put on a layer of damp cotton clothes over your rash. Then, put on a layer of dry clothes on top of the damp ones. People who need these dressings often wear them at night when they sleep.  When should I call the doctor? -- Call your doctor or nurse  for advice if:   You have a rash that does not go away within 2 weeks.   Your rash gets worse or spreads over large parts of your body.   You have signs of infection like swelling, warmth, pain, or fever.  All topics are updated as new evidence becomes available and our peer review process is complete.  This topic retrieved from [employer] on: Feb 26, 2024.  Topic 71984 Version 12.0  Release: 32.2.4 - C32.56  © 2024 UpToDate, Inc. and/or its affiliates. All rights reserved.  picture 1: Chronic irritant contact dermatitis     If you have dermatitis, your skin might be red, dry, itchy, or cracked.  Graphic 464915 Version 2.0  picture 2: Dermatitis caused by nickel allergy     This person has an allergy to nickel, a type of metal. They have dermatitis where the button of their jeans touched their skin.  Graphic 483766 Version 1.0  Consumer Information Use and Disclaimer   Disclaimer: This generalized information is a limited summary of diagnosis, treatment, and/or medication information. It is not meant to be comprehensive and should be used as a tool to help the user understand and/or assess potential diagnostic and treatment options. It does NOT include all information about conditions, treatments, medications, side effects, or risks that may apply to a specific patient. It is not intended to be medical advice or a substitute for the medical advice, diagnosis, or treatment of a health care provider based on the health care provider's examination and assessment of a patient's specific and unique circumstances. Patients must speak with a health care provider for complete information about their health, medical questions, and treatment options, including any risks or benefits regarding use of medications. This information does not endorse any treatments or medications as safe, effective, or approved for treating a specific patient. UpToDate, Inc. and its affiliates disclaim any warranty or liability relating to this  information or the use thereof.The use of this information is governed by the Terms of Use, available at https://www.wolterskluwer.com/en/know/clinical-effectiveness-terms. 2024© UpToDate, Inc. and its affiliates and/or licensors. All rights reserved.  Copyright   © 2024 Up[employer]Date, Inc. and/or its affiliates. All rights reserved.       Perineal Hygiene      Your vaginal naturally takes care of its self, it is a self washing system, the less you mess the healthier it will be     No soaps or feminine wash to the vulva, these products can cause dermitis, bacterial infections and other vulvar problems.   Use only water to cleanse, or water with Dove or Dove Sensitive Skin Bar soap if necessary.    Avoid the use of washcloths, exfoliating renetta's, netted scrubbers, loofa's, use your hands only to cleanse the vulvar tissues    No scented lotions or products are advised in or near your vulva.    Use coconut oil in its solid form for moisture if needed.  No douching this may cause imbalance in your vaginal PH and further issues.    If you wear panty liners, you may apply a thin coating of Vaseline, A&D ointment or coconut oil to the vulvar tissues as a skin barrier     Cotton underware, loose fitting clothing  Only perfume-free, dye-free laundry detergent, use a second rinse cycle   Avoid fabric softeners/dryer sheets.       Your partner should avoid the same products as well.       Over the counter probiotic to restore vaginal sommer may be helpful as well, take daily.       You may also look into Boric Acid vaginal suppositories to restore vaginal PH balance for up to 2 weeks as directed on the box. You may not use these if you are pregnant      For vaginal dryness:      You may use:     Coconut oil in solid form, not liquid (organic, pure, unscented) as needed for moisture or lubrication. ( Do not use if allergic)       Replens moisture restore external comfort gel daily ( use as directed on the box)        Replens long  "lasting vaginal moisturizer  ( use as directed on the box)     May try NewLife vulvar moisturizer ( found on [employer] )    May try Revaree vaginal inserts        For Vaginal Lubrication:          You may use:     Coconut oil in solid form, not liquid (organic, pure, unscented) as a lubricant or another scent-free lubricant (Astroglide, Uberlube) if needed.  Do not use coconut oil or silicone if using a condom as this may break down the integrity of the condom and cause an unplanned pregnancy              Do not use coconut oil if allergic               Replens silky smooth lubricant, premium silicone based lubricant for intercourse. ( use as directed, a small amount will provide an enhanced natural feeling)     Any premium over the counter vaginal lubricant water or silicone based. Silicone based will have more staying power.        For Vulvar irritation/itching:        You may use Hydrocortisone 1 % over the counter to external vulvar tissues 2 x daily for 5-7 days to help with irriation and itch relief.  Patient Education     Vaginitis in adults   The Basics   Written by the doctors and editors at [facility]   What is vaginitis? -- Vaginitis is when the vagina and vulva become red and swollen (figure 1). (The vulva is the area around the opening of the vagina.) It is sometimes called \"vulvovaginitis.\"  Many different things can cause vaginitis. These include:   Infection - There are 3 main infections that can cause vaginitis in adults. These are bacterial vaginosis, \"Candida\" or \"yeast \"infection, and trichomoniasis. Other types of infections can also cause vaginal discharge and irritation. These include some sexually transmitted infections (\"STIs\").   Skin irritation - This can be caused by soaps, bubble bath, douches, detergents, perfumes, toilet paper, or sanitary pads.   Hormone changes - For example, these can be related to puberty, pregnancy, or menopause.   Certain medicines - Examples include antibiotics or " "medicines that weaken the immune system.   Certain health problems - Some medical conditions increase the risk of infection. These include uncontrolled diabetes or HIV infection.  What are the symptoms of vaginitis? -- Symptoms of vaginitis include:   A change in color, odor, or amount of vaginal discharge   Itching or irritation in or around the vagina   Redness, swelling, or cracks in the skin around the vagina   Pain during sex  Should I see a doctor or nurse? -- Yes. If you have the symptoms listed above, see a doctor or nurse.  If possible, avoid using medicines that go in your vagina for a day or 2 before your appointment. If you have recently used vaginal medicine, it can be harder for the doctor or nurse to find the cause of abnormal discharge.  Will I need tests? -- Probably. First, your doctor or nurse will ask questions and do an exam. They will often do tests to check for infection.  If tests are needed, they might include:   pH and microscopy - The doctor or nurse looks at a sample of vaginal discharge under a microscope and checks the \"pH level.\" This is done in the doctor's office.   Lab tests - The doctor or nurse collects a sample of vaginal discharge. Then, the sample is sent to a lab for testing. These tests can show if you have an infection and, if so, what type.   STI tests - These include tests to check for gonorrhea, chlamydia, and trichomoniasis.  Some pH test kits are sold over the counter for use at home. But these are not recommended. That's because they only check the pH and cannot show the cause of infection.  How is vaginitis treated? -- Treatment depends on what is causing the vaginitis. It might include:   Medicines - Medicines are mainly used to treat vaginitis caused by infections. These include pills that you take by mouth, creams or gels that you put in your vagina, or \"suppositories.\" Suppositories are tablets that are inserted into the vagina to dissolve.   Avoiding causes of " irritation - For example, if the vaginitis was caused by using bubble bath, avoiding bubble bath will help.  Can vaginitis be prevented? -- To lower your chances of getting vaginitis, you can:   Get out of wet clothing quickly - Change out of wet swimsuits or gym clothes as soon as you can. Avoid fabrics that hold moisture, like nylon or polyester.   Wear loose-fitting clothing - Tight clothing, such as pantyhose, can trap moisture and make infection more likely.   Clean the area around the vagina with water - Rinse the area with water only. If you must use soap, choose a mild soap and rinse well. Do not use bubble bath or a douche. Do not use perfume or other fragrance sprays around the vagina.   Practice good genital hygiene - Wipe from front to back after using the toilet, and urinate after you have sex.   Use condoms during sex - This can lower your chances of getting bacterial vaginosis or an STI.  What problems should I watch for? -- If you have been diagnosed with vaginitis, call for advice if:   Your symptoms are not getting better or are getting worse after treatment.   Your vaginitis comes back after getting better.  All topics are updated as new evidence becomes available and our peer review process is complete.  This topic retrieved from [employer] on: Mar 29, 2024.  Topic 840874 Version 3.0  Release: 32.2.4 - C32.87  © 2024 UpToDate, Inc. and/or its affiliates. All rights reserved.  figure 1: Adult female external genitalia     This drawing shows the different parts of the genitals.  Graphic 48935 Version 9.0  Consumer Information Use and Disclaimer   Disclaimer: This generalized information is a limited summary of diagnosis, treatment, and/or medication information. It is not meant to be comprehensive and should be used as a tool to help the user understand and/or assess potential diagnostic and treatment options. It does NOT include all information about conditions, treatments, medications, side  effects, or risks that may apply to a specific patient. It is not intended to be medical advice or a substitute for the medical advice, diagnosis, or treatment of a health care provider based on the health care provider's examination and assessment of a patient's specific and unique circumstances. Patients must speak with a health care provider for complete information about their health, medical questions, and treatment options, including any risks or benefits regarding use of medications. This information does not endorse any treatments or medications as safe, effective, or approved for treating a specific patient. UpToDate, Inc. and its affiliates disclaim any warranty or liability relating to this information or the use thereof.The use of this information is governed by the Terms of Use, available at https://www.woltersOpSourceuwer.com/en/know/clinical-effectiveness-terms. 2024© UpToDate, Inc. and its affiliates and/or licensors. All rights reserved.  Copyright   © 2024 UpToDate, Inc. and/or its affiliates. All rights reserved.

## 2024-08-20 NOTE — PROGRESS NOTES
Diagnoses and all orders for this visit:    Vaginal burning  -     POCT wet mount    Vaginal itching  -     POCT wet mount  -     fluconazole (DIFLUCAN) 150 mg tablet; Take one today and 1 in 3 days    Vulvar dermatitis  -     clobetasol (TEMOVATE) 0.05 % ointment; Apply topically 2 (two) times a day Apply a pencil eraser sized amount to entire vulva 2 times daily followed with a thin layer of Vaseline. Use for the next 4 weeks    Yeast infection  -     fluconazole (DIFLUCAN) 150 mg tablet; Take one today and 1 in 3 days      Results for orders placed or performed in visit on 08/20/24   POCT wet mount   Result Value Ref Range    Yeast, Wet Prep Pos     pH 4.0     Whiff Test Neg     Clue Cells Neg     Trich, Wet Prep Neg         Reviewed perineal hygiene and products to avoid. Avoid any soap to vulva for the next week   Apply clobetasol  2 times a day as directed followed by a thin layer of Vaseline for skin barrier protection for the next few weeks until seen by MD at annual exam   Take Diflucan as directed  Reviewed medications and usage instructions   Follow-up with Dr. Avelina Howard at annual exam in September  Call if no symptom improvement, all questions answered,     Kiana Kapoor is a 34 y.o. female here for vaginal complaints. She reports vulvar itching and burning for the past 3 days. She is diabetic, recent weight loss surgery, coming off of diabetic medications     I have seen her in the past for annual and yeast infection, there was a possibioty tf vulvar derm vs Lichen's based on hypopigmentation of skin, pt was using triamcinolone BID, pt was advised to continue with applications, pt did not return after our last visist in April 2024  for re evaluation   Denies any treatments tried.   Denies recent antibiotic use. Denies douching.   Denies fever, pelvic pain or dyspareunia. Denies new sexual partners.  Vitals:    08/20/24 1440   BP: 122/70   BP Location: Right arm   Patient Position: Sitting   Cuff  "Size: Large   Weight: 128 kg (282 lb)   Height: 5' 2\" (1.575 m)     Body mass index is 51.58 kg/m².  Allergies   Allergen Reactions    Iodine - Food Allergy GI Intolerance    Mite (D. Farinae) Eye Swelling and Itching    Red Dye - Food Allergy Rash       Patient Active Problem List    Diagnosis Date Noted    Postsurgical malabsorption 08/15/2024    HTN (hypertension) 08/05/2024    Migraines 08/05/2024    History of Emmanuel-en-Y gastric bypass 04/02/2024    Murmur 04/02/2024    Morbid (severe) obesity due to excess calories ([facility]) 12/28/2023    Class 3 severe obesity due to excess calories with serious comorbidity and body mass index (BMI) of 50.0 to 59.9 in adult ([facility]) 12/28/2023    PCOS (polycystic ovarian syndrome) 01/16/2023    Recurrent major depressive disorder, in partial remission ([facility]) 05/12/2022    Type 2 diabetes mellitus without complication, without long-term current use of insulin ([facility]) 05/12/2022     Past Medical History:   Diagnosis Date    Allergic 2005    dogs, dust mites, shrimp    Anxiety 2000    Asthma     Asthma 02/01/2022    Depression     Well controlled with Zoloft    Diabetes mellitus ([facility])     Eating disorder 2007    BED    Encounter for gynecological examination without abnormal finding 11/29/2021    GERD (gastroesophageal reflux disease)     Headache(784.0)     Hidradenitis suppurativa 03/25/2022    Irritable bowel syndrome     Morbid obesity with body mass index (BMI) of 60.0 to 69.9 in adult ([facility])     Multiple gastric ulcers     Obesity     Pre-conception counseling 01/31/2022    Getting  August 2022, desires pregnancy Hx PCOS, ovarian drilling Weight reduction advised by THANIA Quintero -Check A1C Taking multivitamin (confirm folate). S/p COVID vax (no booster yet, due March 2022)     Past Surgical History:   Procedure Laterality Date    DILATION AND CURETTAGE OF UTERUS      endometrial thickening    EGD AND COLONOSCOPY  2013    FL LUMBAR PUNCTURE DIAGNOSTIC  4/8/2024    OVARY SURGERY "      ovarian drilling     TN LAPS GSTR RSTCV PX W/BYP BRENDA-EN-Y LIMB <150 CM N/A 8/5/2024    Procedure: BYPASS GASTRIC  BRENDA-EN-Y LAPAROSCOPIC;  Surgeon: Raisa Parsons MD;  Location: [facility];  Service: Bariatrics    WISDOM TOOTH EXTRACTION       Social History     Tobacco Use    Smoking status: Former     Current packs/day: 1.00     Average packs/day: 1 pack/day for 5.0 years (5.0 ttl pk-yrs)     Types: Cigarettes     Passive exposure: Never    Smokeless tobacco: Never   Vaping Use    Vaping status: Former   Substance Use Topics    Alcohol use: Not Currently    Drug use: Yes     Types: Marijuana     Comment: medical marijuana- last smoked 8/3/24       Current Outpatient Medications:     clobetasol (TEMOVATE) 0.05 % ointment, Apply topically 2 (two) times a day Apply a pencil eraser sized amount to entire vulva 2 times daily followed with a thin layer of Vaseline. Use for the next 4 weeks, Disp: 30 g, Rfl: 0    fluconazole (DIFLUCAN) 150 mg tablet, Take one today and 1 in 3 days, Disp: 2 tablet, Rfl: 0    acetaZOLAMIDE (DIAMOX) 250 mg tablet, Take 1 tablet (250 mg total) by mouth 2 (two) times a day 8 tablets in morning and 8 tablets at night  CONTINUE TO HOLD FOR NOW AND DISCUSS WITH NEUROLOGY - PILLS LIKELY TOO LARGE TO TAKE S/P SURGERY (Patient not taking: Reported on 8/13/2024), Disp: , Rfl:     albuterol (2.5 mg/3 mL) 0.083 % nebulizer solution, USE 2 VIALS (6ML) VIA NEBULIZER EVERY 6 HOURS AS NEEDED FOR WHEEZING, Disp: 75 mL, Rfl: 0    albuterol (PROVENTIL HFA,VENTOLIN HFA) 90 mcg/act inhaler, Inhale 2 puffs every 4 (four) hours as needed for wheezing, Disp: 6.7 g, Rfl: 5    calcium citrate (CALCITRATE) 950 (200 Ca) MG tablet, Take 1 tablet by mouth daily, Disp: , Rfl:     Multiple Vitamin (multivitamin) tablet, Take 1 tablet by mouth daily, Disp: , Rfl:     omeprazole (PriLOSEC) 20 mg delayed release capsule, Take 1 capsule (20 mg total) by mouth daily, Disp: 90 capsule, Rfl: 1    ondansetron (ZOFRAN)  4 mg tablet, TAKE ONE TABLET BY MOUTH EVERY 8 HOURS AS NEEDED FOR NAUSEA AND VOMITING, Disp: 20 tablet, Rfl: 0    rizatriptan (MAXALT-MLT) 10 mg disintegrating tablet, TAKE 1 TABLET AS NEEDED FOR MIGRAINE MAY REPEAT IN 2 HOURS IF NEEDED, Disp: , Rfl:     sertraline (ZOLOFT) 100 mg tablet, TAKE ONE AND ONE-HALF TABLETS BY MOUTH EVERY DAY, Disp: 135 tablet, Rfl: 1    SUMAtriptan (IMITREX) 5 MG/ACT nasal spray, , Disp: , Rfl:     triamcinolone (KENALOG) 0.1 % ointment, Apply topically 2 (two) times a day, Disp: 30 g, Rfl: 0      OB History    Para Term  AB Living   0 0 0 0 0 0   SAB IAB Ectopic Multiple Live Births   0 0 0 0 0       Review of Systems   Constitutional: Negative for chills, fatigue, fever and unexpected weight change.   Respiratory: Negative for shortness of breath.    Gastrointestinal: Negative abdominal pain, constipation and diarrhea.   Genitourinary: Negative for difficulty urinating, dysuria and hematuria.     Physical Exam   Constitutional: Appears well-developed and well-nourished. No distress. Alert and oriented.  HENT: Atraumatic, Normocephalic.  Conjunctivae clear  Neck: Normal range of motion.   Pulmonary: Effort normal.  Abdominal: Soft. Negative for pain, tenderness or mass  Musculoskeletal: Normal ROM  Skin: Warm & Dry  Psychological: Normal mood, thought content, behavior & judgement       Pelvic exam was performed with patient supine, lithotomy position.      Labia: overall erythema with a raised rash over entire vulva   Urethral meatus:  Negative for  tenderness, inflammation or discharge.   Uterus: not deviated, enlarged, fixed or tender.   Cervix: No CMT, no discharge or friability.   Right adnexa: no mass, no tenderness and no fullness.  Left adnexa: no mass, no tenderness and no fullness.   Vagina: No erythema, tenderness, masses, or foreign body in the vagina. No signs of injury around the vagina. Heavy yellow vaginal discharge noted   Perineum without lesions, signs  of injury, erythema or swelling.  Inguinal Canal:        Right: No inguinal adenopathy or hernia present.        Left: No inguinal adenopathy or hernia present.     Physical Exam  Genitourinary:      Genitourinary Comments: Overall erythema and raised rash external vulva, consistent with vulvar derm

## 2024-08-20 NOTE — PROGRESS NOTES
Kiana Kapoor is a 34 y.o. female  Patient reports some vaginal burning and vaginal itching for the last 3 days .

## 2024-09-09 ENCOUNTER — ANNUAL EXAM (OUTPATIENT)
Dept: OBGYN CLINIC | Facility: CLINIC | Age: 34
End: 2024-09-09
Payer: COMMERCIAL

## 2024-09-09 VITALS
SYSTOLIC BLOOD PRESSURE: 126 MMHG | BODY MASS INDEX: 50.42 KG/M2 | HEIGHT: 62 IN | DIASTOLIC BLOOD PRESSURE: 76 MMHG | HEART RATE: 54 BPM | WEIGHT: 274 LBS | OXYGEN SATURATION: 98 %

## 2024-09-09 DIAGNOSIS — B37.9 YEAST INFECTION: ICD-10-CM

## 2024-09-09 DIAGNOSIS — Z01.419 ENCOUNTER FOR GYNECOLOGICAL EXAMINATION WITHOUT ABNORMAL FINDING: Primary | ICD-10-CM

## 2024-09-09 PROCEDURE — 99395 PREV VISIT EST AGE 18-39: CPT | Performed by: OBSTETRICS & GYNECOLOGY

## 2024-09-09 RX ORDER — TERCONAZOLE 0.4 %
1 CREAM WITH APPLICATOR VAGINAL
Qty: 45 G | Refills: 0 | Status: SHIPPED | OUTPATIENT
Start: 2024-09-09 | End: 2024-09-16

## 2024-09-09 NOTE — PROGRESS NOTES
Assessment/Plan:   Encounter for gynecological examination without abnormal finding  Pap/HPV current  Just had gastric bypass surgery last month  If menstrual irregularity occurs- call for treatment.     Diagnoses and all orders for this visit:    Encounter for gynecological examination without abnormal finding    Yeast infection  -     terconazole (TERAZOL 7) 0.4 % vaginal cream; Insert 1 applicator into the vagina daily at bedtime for 7 days          Subjective:     Patient ID: Kiana Kapoor is a 34 y.o. female.    Patient presents for a routine annual visit  Last Pap Smear- 22 neg/neg  LMP- pt does not get a period  Birth control- none  Colonoscopy- 23 due     Former smoker  Social drinker  Currently sexually active  No family history of uterine, ovarian, cervical or breast cancer  No concerns/questions for today's visit     Just had gastric bypass.  With expected weight loss - menses may return. First few may be heavy or prolonged.     Gynecologic Exam  She complains of missed menses. She reports no genital itching, genital lesions, genital odor, genital rash, pelvic pain, vaginal bleeding or vaginal discharge. This is a chronic problem. The current episode started more than 1 year ago. The problem is unchanged. Pertinent negatives include no chills, constipation, diarrhea, fever, nausea, sore throat or vomiting.       Review of Systems   Constitutional:  Negative for activity change, appetite change, chills, fatigue and fever.   HENT:  Negative for rhinorrhea, sneezing and sore throat.    Eyes:  Negative for visual disturbance.   Respiratory:  Negative for cough, shortness of breath and wheezing.    Cardiovascular:  Negative for chest pain, palpitations and leg swelling.   Gastrointestinal:  Negative for abdominal distention, constipation, diarrhea, nausea and vomiting.   Genitourinary:  Positive for missed menses. Negative for difficulty urinating, pelvic pain and vaginal discharge.    Neurological:  Negative for syncope and light-headedness.         Objective:     Physical Exam  Chest:   Breasts:     Breasts are symmetrical.      Right: No inverted nipple, mass, nipple discharge, skin change or tenderness.      Left: No inverted nipple, mass, nipple discharge, skin change or tenderness.   Genitourinary:     Labia:         Right: No rash, tenderness, lesion or injury.         Left: No rash, tenderness, lesion or injury.       Vagina: Normal. No vaginal discharge, erythema, tenderness or bleeding.      Cervix: No cervical motion tenderness, discharge, friability, erythema or cervical bleeding.      Uterus: Not deviated, not enlarged, not fixed and not tender.       Adnexa:         Right: No mass, tenderness or fullness.          Left: No mass, tenderness or fullness.     Neurological:      Mental Status: She is alert and oriented to person, place, and time.

## 2024-09-11 NOTE — ASSESSMENT & PLAN NOTE
Pap/HPV current  Just had gastric bypass surgery last month  If menstrual irregularity occurs- call for treatment.

## 2024-09-18 ENCOUNTER — CLINICAL SUPPORT (OUTPATIENT)
Dept: BARIATRICS | Facility: CLINIC | Age: 34
End: 2024-09-18

## 2024-09-18 ENCOUNTER — TELEPHONE (OUTPATIENT)
Age: 34
End: 2024-09-18

## 2024-09-18 ENCOUNTER — TELEPHONE (OUTPATIENT)
Dept: FAMILY MEDICINE CLINIC | Facility: CLINIC | Age: 34
End: 2024-09-18

## 2024-09-18 DIAGNOSIS — K91.2 POSTSURGICAL MALABSORPTION: Primary | ICD-10-CM

## 2024-09-18 DIAGNOSIS — F33.41 RECURRENT MAJOR DEPRESSIVE DISORDER, IN PARTIAL REMISSION (HCC): Primary | ICD-10-CM

## 2024-09-18 PROCEDURE — RECHECK

## 2024-09-18 NOTE — PROGRESS NOTES
Weight Management Nutrition Class     Diagnosis: Obesity    Bariatric Surgeon: Dr. Parsons    Surgery: Gastric Bypass Laparoscopic    Class: 5 week post op     Topics discussed today include:     fluid goals post op, protein goals post op, constipation, chew food well, exercise, avoidance of alcohol, PPI use, diet progression, hypoglycemia, dumping syndrome, protein supplems, vitamin/mineral supplements, calcium supplements, additional vitamin B12, iron supplements, and fat soluble vitamins     Patient was able to verbalize basic diet (protein, fluid, vitamin and mineral) recommendations and possible nutrition-related complications. Yes

## 2024-11-11 PROBLEM — Z48.815 ENCOUNTER FOR SURGICAL AFTERCARE FOLLOWING SURGERY OF DIGESTIVE SYSTEM: Status: ACTIVE | Noted: 2024-04-02

## 2024-11-11 NOTE — ASSESSMENT & PLAN NOTE
Lab Results   Component Value Date    HGBA1C 5.2 08/13/2024     -diet controlled - continue healthy diet and exercise and repeat HgbA1C now

## 2024-11-11 NOTE — ASSESSMENT & PLAN NOTE
-s/p Emmanuel-En-Y Gastric Bypass with Dr. Duque on 08/05/24. Weight loss is slow but steady. She will continue with healthy diet and increase exercise and f/u with RD and LCSW now and me in 3 months.    Initial: 301lbs  Current: 248lbs  EWL: 33%  Edgar: current  Current BMI is Body mass index is 44.1 kg/m².    Tolerating a regular diet-yes  Eating at least 60 grams of protein per day-yes  Following 30/60 minute rule with liquids-yes  Drinking at least 64 ounces of fluid per day-no but close  Drinking carbonated beverages-no  Sufficient exercise-yes  Using NSAIDs regularly-no  Using nicotine-no  Using alcohol-no. Advised about the risks of alcohol s/p bariatric surgery and recommend avoiding all alcohol

## 2024-11-11 NOTE — ASSESSMENT & PLAN NOTE
-At risk for malabsorption of vitamins/minerals secondary to malabsorption and restriction of intake from bariatric surgery  -Currently taking adequate postop bariatric surgery vitamin supplementation: Bimal MVI with 45mg iron, calcium citrate 500mg TID    -Labs yesterday - Vitamin D low normal - add extra 2,000IU D3 daily    -Obtain CBC/Metabolic panel in 3 months  -Patient received education about the importance of adhering to a lifelong supplementation regimen to avoid vitamin/mineral deficiencies

## 2024-11-13 ENCOUNTER — OFFICE VISIT (OUTPATIENT)
Dept: FAMILY MEDICINE CLINIC | Facility: CLINIC | Age: 34
End: 2024-11-13
Payer: COMMERCIAL

## 2024-11-13 ENCOUNTER — RESULTS FOLLOW-UP (OUTPATIENT)
Dept: FAMILY MEDICINE CLINIC | Facility: CLINIC | Age: 34
End: 2024-11-13

## 2024-11-13 VITALS
HEART RATE: 78 BPM | DIASTOLIC BLOOD PRESSURE: 72 MMHG | HEIGHT: 62 IN | BODY MASS INDEX: 45.08 KG/M2 | WEIGHT: 245 LBS | OXYGEN SATURATION: 98 % | TEMPERATURE: 97.8 F | SYSTOLIC BLOOD PRESSURE: 112 MMHG

## 2024-11-13 DIAGNOSIS — E66.01 MORBID (SEVERE) OBESITY DUE TO EXCESS CALORIES (HCC): ICD-10-CM

## 2024-11-13 DIAGNOSIS — F33.41 RECURRENT MAJOR DEPRESSIVE DISORDER, IN PARTIAL REMISSION (HCC): ICD-10-CM

## 2024-11-13 DIAGNOSIS — I10 HYPERTENSION, UNSPECIFIED TYPE: ICD-10-CM

## 2024-11-13 DIAGNOSIS — Z00.00 ANNUAL PHYSICAL EXAM: Primary | ICD-10-CM

## 2024-11-13 DIAGNOSIS — G43.919 INTRACTABLE MIGRAINE WITHOUT STATUS MIGRAINOSUS, UNSPECIFIED MIGRAINE TYPE: ICD-10-CM

## 2024-11-13 DIAGNOSIS — E11.9 TYPE 2 DIABETES MELLITUS WITHOUT COMPLICATION, WITHOUT LONG-TERM CURRENT USE OF INSULIN (HCC): ICD-10-CM

## 2024-11-13 PROBLEM — Z48.815 ENCOUNTER FOR SURGICAL AFTERCARE FOLLOWING SURGERY OF DIGESTIVE SYSTEM: Status: RESOLVED | Noted: 2024-04-02 | Resolved: 2024-11-13

## 2024-11-13 PROBLEM — E66.813 CLASS 3 SEVERE OBESITY DUE TO EXCESS CALORIES WITH SERIOUS COMORBIDITY AND BODY MASS INDEX (BMI) OF 50.0 TO 59.9 IN ADULT (HCC): Status: RESOLVED | Noted: 2023-12-28 | Resolved: 2024-11-13

## 2024-11-13 LAB
LEFT EYE DIABETIC RETINOPATHY: NORMAL
LEFT EYE IMAGE QUALITY: NORMAL
LEFT EYE MACULAR EDEMA: NORMAL
LEFT EYE OTHER RETINOPATHY: NORMAL
RIGHT EYE DIABETIC RETINOPATHY: NORMAL
RIGHT EYE IMAGE QUALITY: NORMAL
RIGHT EYE MACULAR EDEMA: NORMAL
RIGHT EYE OTHER RETINOPATHY: NORMAL
SEVERITY (EYE EXAM): NORMAL

## 2024-11-13 PROCEDURE — 99395 PREV VISIT EST AGE 18-39: CPT | Performed by: STUDENT IN AN ORGANIZED HEALTH CARE EDUCATION/TRAINING PROGRAM

## 2024-11-13 PROCEDURE — 92250 FUNDUS PHOTOGRAPHY W/I&R: CPT | Performed by: STUDENT IN AN ORGANIZED HEALTH CARE EDUCATION/TRAINING PROGRAM

## 2024-11-13 NOTE — ASSESSMENT & PLAN NOTE
Lab Results   Component Value Date    HGBA1C 5.2 08/13/2024       Orders:    IRIS Diabetic eye exam    Albumin / creatinine urine ratio; Future    Lipid Panel with Direct LDL reflex; Future    TSH, 3rd generation with Free T4 reflex; Future

## 2024-11-13 NOTE — PROGRESS NOTES
Adult Annual Physical  Name: Kiana Kapoor      : 1990      MRN: [id number]  Encounter Provider: Rogelio Hough MD  Encounter Date: 2024   Encounter department: [facility] [address]    Assessment & Plan  Annual physical exam         Type 2 diabetes mellitus without complication, without long-term current use of insulin (HCC)    Lab Results   Component Value Date    HGBA1C 5.2 2024       Orders:    IRIS Diabetic eye exam    Albumin / creatinine urine ratio; Future    Lipid Panel with Direct LDL reflex; Future    TSH, 3rd generation with Free T4 reflex; Future    Hypertension, unspecified type  stable       Intractable migraine without status migrainosus, unspecified migraine type         Morbid (severe) obesity due to excess calories (HCC)  Doing excellent s/p bariatric surgery         Recurrent major depressive disorder, in partial remission (HCC)  Stable on zoloft           Immunizations and preventive care screenings were discussed with patient today. Appropriate education was printed on patient's after visit summary.    Counseling:  Exercise: the importance of regular exercise/physical activity was discussed. Recommend exercise 3-5 times per week for at least 30 minutes.          History of Present Illness     Adult Annual Physical    Felling better since losing weight and bariatric surgery. More energized.some hip and baclk pain she attributes tot he weight loss, comes and goes.    Recently restarted on diamox. Headaches and other symptoms improved. Using rizatriptan occasionally and it helps.    Mental health: not great, was using food for treamtent but this had to change after the surgery. Taking care of her animals and organize her hobbies.    Review of Systems   Constitutional:  Negative for chills, fatigue and fever.   HENT:  Negative for rhinorrhea and sore throat.    Eyes:  Negative for visual disturbance.   Respiratory:  Negative for cough  "and shortness of breath.    Cardiovascular:  Negative for chest pain and palpitations.   Gastrointestinal:  Negative for abdominal pain, constipation, diarrhea, nausea and vomiting.   Genitourinary:  Negative for difficulty urinating, dysuria and frequency.   Musculoskeletal:  Negative for arthralgias and myalgias.   Skin:  Negative for color change and rash.   Neurological:  Negative for weakness and headaches.         Objective     /72   Pulse 78   Temp 97.8 °F (36.6 °C)   Ht 5' 2\" (1.575 m)   Wt 111 kg (245 lb)   SpO2 98%   BMI 44.81 kg/m²     Physical Exam  Constitutional:       General: She is not in acute distress.     Appearance: Normal appearance. She is obese. She is not ill-appearing.   HENT:      Head: Normocephalic and atraumatic.      Right Ear: Tympanic membrane, ear canal and external ear normal.      Left Ear: Tympanic membrane, ear canal and external ear normal.      Nose: Nose normal.      Mouth/Throat:      Mouth: Mucous membranes are moist.      Pharynx: Oropharynx is clear. No oropharyngeal exudate or posterior oropharyngeal erythema.   Eyes:      General: No scleral icterus.        Right eye: No discharge.         Left eye: No discharge.      Extraocular Movements: Extraocular movements intact.      Conjunctiva/sclera: Conjunctivae normal.      Pupils: Pupils are equal, round, and reactive to light.   Cardiovascular:      Rate and Rhythm: Normal rate and regular rhythm.      Pulses: Normal pulses.      Heart sounds: Normal heart sounds. No murmur heard.  Pulmonary:      Effort: Pulmonary effort is normal. No respiratory distress.      Breath sounds: Normal breath sounds.   Abdominal:      General: Bowel sounds are normal.      Palpations: Abdomen is soft.      Tenderness: There is no abdominal tenderness.   Musculoskeletal:         General: Normal range of motion.      Cervical back: Normal range of motion and neck supple.   Lymphadenopathy:      Cervical: No cervical adenopathy. "   Skin:     General: Skin is warm and dry.      Capillary Refill: Capillary refill takes less than 2 seconds.   Neurological:      General: No focal deficit present.      Mental Status: She is alert and oriented to person, place, and time. Mental status is at baseline.      Cranial Nerves: No cranial nerve deficit.   Psychiatric:         Mood and Affect: Mood normal.

## 2024-11-13 NOTE — PATIENT INSTRUCTIONS
"Patient Education     Routine physical for adults   The Basics   Written by the doctors and editors at [facility]   What is a physical? -- A physical is a routine visit, or \"check-up,\" with your doctor. You might also hear it called a \"wellness visit\" or \"preventive visit.\"  During each visit, the doctor will:   Ask about your physical and mental health   Ask about your habits, behaviors, and lifestyle   Do an exam   Give you vaccines if needed   Talk to you about any medicines you take   Give advice about your health   Answer your questions  Getting regular check-ups is an important part of taking care of your health. It can help your doctor find and treat any problems you have. But it's also important for preventing health problems.  A routine physical is different from a \"sick visit.\" A sick visit is when you see a doctor because of a health concern or problem. Since physicals are scheduled ahead of time, you can think about what you want to ask the doctor.  How often should I get a physical? -- It depends on your age and health. In general, for people age 21 years and older:   If you are younger than 50 years, you might be able to get a physical every 3 years.   If you are 50 years or older, your doctor might recommend a physical every year.  If you have an ongoing health condition, like diabetes or high blood pressure, your doctor will probably want to see you more often.  What happens during a physical? -- In general, each visit will include:   Physical exam - The doctor or nurse will check your height, weight, heart rate, and blood pressure. They will also look at your eyes and ears. They will ask about how you are feeling and whether you have any symptoms that bother you.   Medicines - It's a good idea to bring a list of all the medicines you take to each doctor visit. Your doctor will talk to you about your medicines and answer any questions. Tell them if you are having any side effects that bother you. You " "should also tell them if you are having trouble paying for any of your medicines.   Habits and behaviors - This includes:   Your diet   Your exercise habits   Whether you smoke, drink alcohol, or use drugs   Whether you are sexually active   Whether you feel safe at home  Your doctor will talk to you about things you can do to improve your health and lower your risk of health problems. They will also offer help and support. For example, if you want to quit smoking, they can give you advice and might prescribe medicines. If you want to improve your diet or get more physical activity, they can help you with this, too.   Lab tests, if needed - The tests you get will depend on your age and situation. For example, your doctor might want to check your:   Cholesterol   Blood sugar   Iron level   Vaccines - The recommended vaccines will depend on your age, health, and what vaccines you already had. Vaccines are very important because they can prevent certain serious or deadly infections.   Discussion of screening - \"Screening\" means checking for diseases or other health problems before they cause symptoms. Your doctor can recommend screening based on your age, risk, and preferences. This might include tests to check for:   Cancer, such as breast, prostate, cervical, ovarian, colorectal, prostate, lung, or skin cancer   Sexually transmitted infections, such as chlamydia and gonorrhea   Mental health conditions like depression and anxiety  Your doctor will talk to you about the different types of screening tests. They can help you decide which screenings to have. They can also explain what the results might mean.   Answering questions - The physical is a good time to ask the doctor or nurse questions about your health. If needed, they can refer you to other doctors or specialists, too.  Adults older than 65 years often need other care, too. As you get older, your doctor will talk to you about:   How to prevent falling at " home   Hearing or vision tests   Memory testing   How to take your medicines safely   Making sure that you have the help and support you need at home  All topics are updated as new evidence becomes available and our peer review process is complete.  This topic retrieved from [employer] on: May 02, 2024.  Topic 442941 Version 1.0  Release: 32.4.3 - C32.122  © 2024 UpToDate, Inc. and/or its affiliates. All rights reserved.  Consumer Information Use and Disclaimer   Disclaimer: This generalized information is a limited summary of diagnosis, treatment, and/or medication information. It is not meant to be comprehensive and should be used as a tool to help the user understand and/or assess potential diagnostic and treatment options. It does NOT include all information about conditions, treatments, medications, side effects, or risks that may apply to a specific patient. It is not intended to be medical advice or a substitute for the medical advice, diagnosis, or treatment of a health care provider based on the health care provider's examination and assessment of a patient's specific and unique circumstances. Patients must speak with a health care provider for complete information about their health, medical questions, and treatment options, including any risks or benefits regarding use of medications. This information does not endorse any treatments or medications as safe, effective, or approved for treating a specific patient. UpToDate, Inc. and its affiliates disclaim any warranty or liability relating to this information or the use thereof.The use of this information is governed by the Terms of Use, available at https://www.[employer]uwer.com/en/know/clinical-effectiveness-terms. 2024© UpToDate, Inc. and its affiliates and/or licensors. All rights reserved.  Copyright   © 2024 UpToDate, Inc. and/or its affiliates. All rights reserved.

## 2024-11-14 ENCOUNTER — APPOINTMENT (OUTPATIENT)
Dept: LAB | Facility: HOSPITAL | Age: 34
End: 2024-11-14
Payer: COMMERCIAL

## 2024-11-14 DIAGNOSIS — K91.2 POSTSURGICAL MALABSORPTION: ICD-10-CM

## 2024-11-14 DIAGNOSIS — E11.9 TYPE 2 DIABETES MELLITUS WITHOUT COMPLICATION, WITHOUT LONG-TERM CURRENT USE OF INSULIN (HCC): ICD-10-CM

## 2024-11-14 LAB
25(OH)D3 SERPL-MCNC: 36.4 NG/ML (ref 30–100)
ALBUMIN SERPL BCG-MCNC: 4.3 G/DL (ref 3.5–5)
ALP SERPL-CCNC: 63 U/L (ref 34–104)
ALT SERPL W P-5'-P-CCNC: 18 U/L (ref 7–52)
ANION GAP SERPL CALCULATED.3IONS-SCNC: 9 MMOL/L (ref 4–13)
AST SERPL W P-5'-P-CCNC: 21 U/L (ref 13–39)
BASOPHILS # BLD AUTO: 0.08 THOUSANDS/ÂΜL (ref 0–0.1)
BASOPHILS NFR BLD AUTO: 1 % (ref 0–1)
BILIRUB SERPL-MCNC: 0.57 MG/DL (ref 0.2–1)
BUN SERPL-MCNC: 12 MG/DL (ref 5–25)
CALCIUM SERPL-MCNC: 9.7 MG/DL (ref 8.4–10.2)
CHLORIDE SERPL-SCNC: 106 MMOL/L (ref 96–108)
CHOLEST SERPL-MCNC: 157 MG/DL (ref ?–200)
CO2 SERPL-SCNC: 25 MMOL/L (ref 21–32)
CREAT SERPL-MCNC: 0.49 MG/DL (ref 0.6–1.3)
CREAT UR-MCNC: 278.8 MG/DL
EOSINOPHIL # BLD AUTO: 0.32 THOUSAND/ÂΜL (ref 0–0.61)
EOSINOPHIL NFR BLD AUTO: 4 % (ref 0–6)
ERYTHROCYTE [DISTWIDTH] IN BLOOD BY AUTOMATED COUNT: 13.1 % (ref 11.6–15.1)
EST. AVERAGE GLUCOSE BLD GHB EST-MCNC: 100 MG/DL
FERRITIN SERPL-MCNC: 94 NG/ML (ref 11–307)
FOLATE SERPL-MCNC: 16.8 NG/ML
GFR SERPL CREATININE-BSD FRML MDRD: 125 ML/MIN/1.73SQ M
GLUCOSE P FAST SERPL-MCNC: 94 MG/DL (ref 65–99)
HBA1C MFR BLD: 5.1 %
HCT VFR BLD AUTO: 44.2 % (ref 34.8–46.1)
HDLC SERPL-MCNC: 37 MG/DL
HGB BLD-MCNC: 14.4 G/DL (ref 11.5–15.4)
IMM GRANULOCYTES # BLD AUTO: 0.01 THOUSAND/UL (ref 0–0.2)
IMM GRANULOCYTES NFR BLD AUTO: 0 % (ref 0–2)
IRON SATN MFR SERPL: 28 % (ref 15–50)
IRON SERPL-MCNC: 83 UG/DL (ref 50–212)
LDLC SERPL CALC-MCNC: 98 MG/DL (ref 0–100)
LYMPHOCYTES # BLD AUTO: 2.29 THOUSANDS/ÂΜL (ref 0.6–4.47)
LYMPHOCYTES NFR BLD AUTO: 31 % (ref 14–44)
MCH RBC QN AUTO: 29 PG (ref 26.8–34.3)
MCHC RBC AUTO-ENTMCNC: 32.6 G/DL (ref 31.4–37.4)
MCV RBC AUTO: 89 FL (ref 82–98)
MICROALBUMIN UR-MCNC: 100 MG/L
MICROALBUMIN/CREAT 24H UR: 36 MG/G CREATININE (ref 0–30)
MONOCYTES # BLD AUTO: 0.58 THOUSAND/ÂΜL (ref 0.17–1.22)
MONOCYTES NFR BLD AUTO: 8 % (ref 4–12)
NEUTROPHILS # BLD AUTO: 4.16 THOUSANDS/ÂΜL (ref 1.85–7.62)
NEUTS SEG NFR BLD AUTO: 56 % (ref 43–75)
NRBC BLD AUTO-RTO: 0 /100 WBCS
PLATELET # BLD AUTO: 235 THOUSANDS/UL (ref 149–390)
PMV BLD AUTO: 11.1 FL (ref 8.9–12.7)
POTASSIUM SERPL-SCNC: 3.5 MMOL/L (ref 3.5–5.3)
PROT SERPL-MCNC: 7.3 G/DL (ref 6.4–8.4)
PTH-INTACT SERPL-MCNC: 44 PG/ML (ref 12–88)
RBC # BLD AUTO: 4.96 MILLION/UL (ref 3.81–5.12)
SODIUM SERPL-SCNC: 140 MMOL/L (ref 135–147)
TIBC SERPL-MCNC: 295 UG/DL (ref 250–450)
TRIGL SERPL-MCNC: 111 MG/DL (ref ?–150)
TSH SERPL DL<=0.05 MIU/L-ACNC: 2.23 UIU/ML (ref 0.45–4.5)
UIBC SERPL-MCNC: 212 UG/DL (ref 155–355)
VIT B12 SERPL-MCNC: 606 PG/ML (ref 180–914)
WBC # BLD AUTO: 7.44 THOUSAND/UL (ref 4.31–10.16)

## 2024-11-14 PROCEDURE — 82043 UR ALBUMIN QUANTITATIVE: CPT

## 2024-11-14 PROCEDURE — 84590 ASSAY OF VITAMIN A: CPT

## 2024-11-14 PROCEDURE — 84425 ASSAY OF VITAMIN B-1: CPT

## 2024-11-14 PROCEDURE — 82746 ASSAY OF FOLIC ACID SERUM: CPT

## 2024-11-14 PROCEDURE — 83550 IRON BINDING TEST: CPT

## 2024-11-14 PROCEDURE — 82607 VITAMIN B-12: CPT

## 2024-11-14 PROCEDURE — 80061 LIPID PANEL: CPT

## 2024-11-14 PROCEDURE — 84630 ASSAY OF ZINC: CPT

## 2024-11-14 PROCEDURE — 80053 COMPREHEN METABOLIC PANEL: CPT

## 2024-11-14 PROCEDURE — 83036 HEMOGLOBIN GLYCOSYLATED A1C: CPT

## 2024-11-14 PROCEDURE — 84443 ASSAY THYROID STIM HORMONE: CPT

## 2024-11-14 PROCEDURE — 85025 COMPLETE CBC W/AUTO DIFF WBC: CPT

## 2024-11-14 PROCEDURE — 83970 ASSAY OF PARATHORMONE: CPT

## 2024-11-14 PROCEDURE — 82728 ASSAY OF FERRITIN: CPT

## 2024-11-14 PROCEDURE — 36415 COLL VENOUS BLD VENIPUNCTURE: CPT

## 2024-11-14 PROCEDURE — 82570 ASSAY OF URINE CREATININE: CPT

## 2024-11-14 PROCEDURE — 82306 VITAMIN D 25 HYDROXY: CPT

## 2024-11-14 PROCEDURE — 83540 ASSAY OF IRON: CPT

## 2024-11-15 ENCOUNTER — OFFICE VISIT (OUTPATIENT)
Dept: BARIATRICS | Facility: CLINIC | Age: 34
End: 2024-11-15
Payer: COMMERCIAL

## 2024-11-15 VITALS — HEIGHT: 63 IN | BODY MASS INDEX: 44.1 KG/M2

## 2024-11-15 DIAGNOSIS — K91.2 POSTSURGICAL MALABSORPTION: ICD-10-CM

## 2024-11-15 DIAGNOSIS — I10 HYPERTENSION, UNSPECIFIED TYPE: ICD-10-CM

## 2024-11-15 DIAGNOSIS — Z48.815 ENCOUNTER FOR SURGICAL AFTERCARE FOLLOWING SURGERY OF DIGESTIVE SYSTEM: Primary | ICD-10-CM

## 2024-11-15 DIAGNOSIS — E11.9 TYPE 2 DIABETES MELLITUS WITHOUT COMPLICATION, WITHOUT LONG-TERM CURRENT USE OF INSULIN (HCC): ICD-10-CM

## 2024-11-15 PROCEDURE — 99214 OFFICE O/P EST MOD 30 MIN: CPT | Performed by: PHYSICIAN ASSISTANT

## 2024-11-15 NOTE — PROGRESS NOTES
Assessment/Plan:    Encounter for surgical aftercare following surgery of digestive system  -s/p Emmanuel-En-Y Gastric Bypass with Dr. Duque on 08/05/24. Weight loss is slow but steady. She will continue with healthy diet and increase exercise and f/u with RD and LCSW now and me in 3 months.    Initial: 301lbs  Current: 248lbs  EWL: 33%  Edgar: current  Current BMI is Body mass index is 44.1 kg/m².    Tolerating a regular diet-yes  Eating at least 60 grams of protein per day-yes  Following 30/60 minute rule with liquids-yes  Drinking at least 64 ounces of fluid per day-no but close  Drinking carbonated beverages-no  Sufficient exercise-yes  Using NSAIDs regularly-no  Using nicotine-no  Using alcohol-no. Advised about the risks of alcohol s/p bariatric surgery and recommend avoiding all alcohol      Postsurgical malabsorption  -At risk for malabsorption of vitamins/minerals secondary to malabsorption and restriction of intake from bariatric surgery  -Currently taking adequate postop bariatric surgery vitamin supplementation: Bimal MVI with 45mg iron, calcium citrate 500mg TID    -Labs yesterday - Vitamin D low normal - add extra 2,000IU D3 daily    -Obtain CBC/Metabolic panel in 3 months  -Patient received education about the importance of adhering to a lifelong supplementation regimen to avoid vitamin/mineral deficiencies       HTN (hypertension)  -no medications   -Continue monitoring and management with prescribing provider      Type 2 diabetes mellitus without complication, without long-term current use of insulin (HCC)    Lab Results   Component Value Date    HGBA1C 5.2 08/13/2024     -diet controlled - continue healthy diet and exercise and repeat HgbA1C now     Diagnoses and all orders for this visit:    Encounter for surgical aftercare following surgery of digestive system    Postsurgical malabsorption    Hypertension, unspecified type    Type 2 diabetes mellitus without complication, without long-term current use  "of insulin ([facility])          Subjective:      Patient ID: Kiana Kapoor is a 34 y.o. female.    -s/p Emmanuel-En-Y Gastric Bypass with Dr. Duque on 08/05/24. Presents to the office today for routine follow up. Tolerating diet without issues; denies N/V, dysphagia, reflux. Overall doing well. She is very anxious about progression and weight loss.    She has 6 siblings. 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  She has 4 cats and 1 dog.  spouse José. No kids yet but hope to in future.    Diet Recall:   B - oatmeal or cheese and meat or eggs  L - tuna and quinoa and veggies  D - chicken and veggies    Fluids - 40oz water, 1 protein shake, 2 SF ice pops    The following portions of the patient's history were reviewed and updated as appropriate: allergies, current medications, past family history, past medical history, past social history, past surgical history and problem list.    Review of Systems   Constitutional:  Negative for chills and fever. Unexpected weight change: planned weight loss.  HENT:  Negative for trouble swallowing.    Respiratory:  Negative for cough and shortness of breath.    Cardiovascular:  Negative for chest pain and palpitations.   Gastrointestinal:  Negative for abdominal pain, constipation, diarrhea, nausea and vomiting.   Neurological:  Negative for dizziness.   Psychiatric/Behavioral:          +Anxiety         Objective:      Ht 5' 2.5\" (1.588 m)   BMI 44.10 kg/m²     Colonoscopy-Not applicable       Physical Exam  Vitals reviewed.   Constitutional:       General: She is not in acute distress.     Appearance: She is well-developed.   HENT:      Head: Normocephalic and atraumatic.   Eyes:      General: No scleral icterus.  Cardiovascular:      Rate and Rhythm: Normal rate and regular rhythm.   Pulmonary:      Effort: Pulmonary effort is normal. No respiratory distress.   Abdominal:      General: There is no distension.      Palpations: Abdomen is " soft.   Skin:     General: Skin is warm and dry.   Neurological:      Mental Status: She is alert.   Psychiatric:         Mood and Affect: Mood normal.         Behavior: Behavior normal.           BARRIERS: none identified    GOALS:   Continued/Maintain healthy weight loss with good nutrition intakes.  Adequate hydration with at least 64oz. fluid intake.  Normal vitamin and mineral levels.  Exercise as tolerated.  Take extra 2,000IU D3 daily with food    Follow-up in 3 months. We kindly ask that your arrive 15 minutes before your scheduled appointment time with your provider to allow our staff to room you, get your vital signs and update your chart.    Follow diet as discussed.      Get lab work done in 3 months.  You have been given a lab slip today.  Please call the office if you need a replacement.  It is recommended to check with your insurance BEFORE getting labs done to make sure they are covered by your policy.  Also, please check with your PCP and other providers before getting labs to avoid duplicate labs. Make sure to HOLD any multivitamins that may contain biotin and any biotin supplements FOR 5 DAYS before any labs since it can affect the results.    Follow vitamin and mineral recommendations as reviewed with you.    Call our office if you have any problems with abdominal pain especially associated with fever, chills, nausea, vomiting or any other concerns.    All  Post-bariatric surgery patients should be aware that very small quantities of any alcohol can cause impairment and it is very possible not to feel the effect. The effect can be in the system for several hours.  It is also a stomach irritant.     It is advised to AVOID alcohol, Nonsteroidal antiinflammatory drugs (NSAIDS) and nicotine of all forms . Any of these can cause stomach irritation/pain.

## 2024-11-15 NOTE — PATIENT INSTRUCTIONS
GOALS:   Continued/Maintain healthy weight loss with good nutrition intakes.  Adequate hydration with at least 64oz. fluid intake.  Normal vitamin and mineral levels.  Exercise as tolerated.  Take extra 2,000IU D3 daily with food    Follow-up in 3 months. We kindly ask that your arrive 15 minutes before your scheduled appointment time with your provider to allow our staff to room you, get your vital signs and update your chart.    Follow diet as discussed.      Get lab work done in 3 months.  You have been given a lab slip today.  Please call the office if you need a replacement.  It is recommended to check with your insurance BEFORE getting labs done to make sure they are covered by your policy.  Also, please check with your PCP and other providers before getting labs to avoid duplicate labs. Make sure to HOLD any multivitamins that may contain biotin and any biotin supplements FOR 5 DAYS before any labs since it can affect the results.    Follow vitamin and mineral recommendations as reviewed with you.    Call our office if you have any problems with abdominal pain especially associated with fever, chills, nausea, vomiting or any other concerns.    All  Post-bariatric surgery patients should be aware that very small quantities of any alcohol can cause impairment and it is very possible not to feel the effect. The effect can be in the system for several hours.  It is also a stomach irritant.     It is advised to AVOID alcohol, Nonsteroidal antiinflammatory drugs (NSAIDS) and nicotine of all forms . Any of these can cause stomach irritation/pain.

## 2024-11-18 LAB
VIT B1 BLD-SCNC: 140 NMOL/L (ref 66.5–200)
ZINC SERPL-MCNC: 95 UG/DL (ref 44–115)

## 2024-11-21 LAB — VIT A SERPL-MCNC: 27.2 UG/DL (ref 18.9–57.3)

## 2024-12-16 NOTE — PROGRESS NOTES
"Bariatric Nutrition Progress Note    S/P RNY - Dr. Parsons on 8/5/2024      Height: 5'2.5\"    Current Weight: 236.4#   BMI: 43.2#  Weight Prior to Weight Loss Surgery: 303#   BMI: 54.5  Weight in (lb) to have BMI = 25: 139#  ELVIN:current  Regain: N/A    Vitamin Regimen:  Bimal MVI with 45mg iron, calcium citrate 500mg TID   11/14/24 - Vitamin D low normal - add extra 2,000IU D3 daily       Diet and exercise:    Tolerating a regular diet - Yes  3 meals: Yes  Snacking/grazing- No  Volume: at 4 months 1/3 cup Yes  Eating at least 60 grams of protein per day- Yes  Protein drinks: Yes - [employer]   Following 30/60 minute rule with liquids-yes  Eating/drinking: chewing food well- sipping fluids Yes  Drinking at least 64 ounces of fluid per day- Yes  Drinking carbonated beverages-no  Food logging- [employer] Helps to keep structure  GI discomforts No  Exercise: - keep active and has stationary bike   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  She has 4 cats and 1 dog.  spouse José. No kids yet but hope to in future.       Diet Recall:   B - oatmeal or cheese and meat or eggs - Ratio Yogurt or leftovers  L - tuna and quinoa and veggies  D - 1.5 chicken thighs and 1-2 Tbsp veggies and/or starch  Ground beef and pork roast or veggie burger     Fluids - 40oz water, 1 protein shake, 2 SF ice pops     Visit Summary 12/19/2024  4 months post op. Seen Day[facility] for 3 month post op in November. Concerned with frequent stalls and whether weight loss on target. Had discussion and pt averaging 3#/ week  - Uses variety of protein shakes and focuses on protein. Volume per meal appropriate per current phase. Calories 700-900 - Protein 60-70 grams Advised to keep calories under 800. Fluids near 64 oz . Taking vitamins as instructed.  Answered and addressed  pt's questions./concerns. Also advised on  holiday eating. Pt " receptive      Goals  Keep to post op guidelines  Increase protein (70-80 grams)  Maintain/hydration ( 64 oz )  Avoid grazing  Volume at 1 veggie c per meals - Macro goals as discussed  Keep to vitamin regimen as advised ( Bimal Multi with 45 mg Iron and 1500 mg Calcium)   - Add Vitamin D 2000 IU  Increase physical activity and include exercise as able  F/U RD as needed     Time Spent : 45 minutes

## 2024-12-19 ENCOUNTER — CLINICAL SUPPORT (OUTPATIENT)
Dept: BARIATRICS | Facility: CLINIC | Age: 34
End: 2024-12-19

## 2024-12-19 VITALS — WEIGHT: 236.4 LBS | BODY MASS INDEX: 43.5 KG/M2 | HEIGHT: 62 IN

## 2024-12-19 DIAGNOSIS — K91.2 POSTSURGICAL MALABSORPTION: Primary | ICD-10-CM

## 2024-12-19 PROCEDURE — RECHECK

## 2025-01-14 NOTE — PROGRESS NOTES
Post op support. S/P RNY with Dr. Duque on 8/5/24. Patient had recent follow up with NOLAN Stock 11/15/24 and was referred to LCSW and RD for additional support.  Follows with neurology for IIH- dx about a year ago and started on diamox. Had recent visit and discussed increase in headaches over the last 6 months. Stopped the diamox after having RNY for about 3 months due to not being able to tolerate it- has to take 8 pills in the morning. They tried liquid, but that was worse- was vomiting it up. Resumed the diamox about 2 months ago. 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 Now getting her period more regularly- was not getting a period regularly before. Having a lot of mood swings/emotional the week before her period. Feels burnt out. Everyone else at her office is as well- has been organizing activities to help boost morale. Currently on zoloft and does not feel that it has been helpful. PCP referred her to [facility] psychiatry- gave number to follow up. Sees her therapist virtually every 2 weeks. Has been working on listening to her body more, putting her needs first. Encouraged to continue to set boundaries for self care. Patient will follow up with [facility] as needed.  Kady Roa LCSW

## 2025-01-15 ENCOUNTER — CLINICAL SUPPORT (OUTPATIENT)
Dept: BARIATRICS | Facility: CLINIC | Age: 35
End: 2025-01-15

## 2025-01-15 DIAGNOSIS — Z98.84 BARIATRIC SURGERY STATUS: Primary | ICD-10-CM

## 2025-01-15 DIAGNOSIS — F43.23 ADJUSTMENT DISORDER WITH MIXED ANXIETY AND DEPRESSED MOOD: ICD-10-CM

## 2025-01-15 PROCEDURE — RECHECK

## 2025-01-30 ENCOUNTER — VBI (OUTPATIENT)
Dept: ADMINISTRATIVE | Facility: OTHER | Age: 35
End: 2025-01-30

## 2025-01-30 NOTE — TELEPHONE ENCOUNTER
01/30/25 11:01 AM     Chart reviewed for Hemoglobin A1c was/were submitted to the patient's insurance.     Sandra Blake MA   [facility]

## 2025-02-11 ENCOUNTER — TELEPHONE (OUTPATIENT)
Age: 35
End: 2025-02-11

## 2025-02-24 NOTE — ASSESSMENT & PLAN NOTE
Lab Results   Component Value Date    HGBA1C 5.1 11/14/2024     -diet controlled - continue healthy diet and exercise and repeat HgbA1C now

## 2025-02-24 NOTE — ASSESSMENT & PLAN NOTE
-s/p Emmanuel-En-Y Gastric Bypass with Dr. Duque on 08/05/24. Weight loss is very good and much improved this last 3 months. She will continue with healthy diet and increase exercise and f/u with RD and LCSW now and me in 3 months.    Initial: 301lbs  Current: 217lbs  EWL: 52%  Edgar: current  Current BMI is Body mass index is 39.06 kg/m².    Tolerating a regular diet-yes  Eating at least 60 grams of protein per day-yes  Following 30/60 minute rule with liquids-yes  Drinking at least 64 ounces of fluid per day-no but close  Drinking carbonated beverages-no  Sufficient exercise-yes  Using NSAIDs regularly-no  Using nicotine-no  Using alcohol-no. Advised about the risks of alcohol s/p bariatric surgery and recommend avoiding all alcohol

## 2025-02-24 NOTE — ASSESSMENT & PLAN NOTE
-At risk for malabsorption of vitamins/minerals secondary to malabsorption and restriction of intake from bariatric surgery  -Currently taking adequate postop bariatric surgery vitamin supplementation: Bimal MVI with 45mg iron, calcium citrate 500mg TID    -Reviewed labs from 02/27/25 - folate low normal - take methylated folate daily and B12 low normal - take extra 1,000mcg sublingual daily    -Obtain CBC/Metabolic panel in 6 months  -Patient received education about the importance of adhering to a lifelong supplementation regimen to avoid vitamin/mineral deficiencies

## 2025-02-26 DIAGNOSIS — E66.01 OBESITY, CLASS III, BMI 40-49.9 (MORBID OBESITY) (HCC): ICD-10-CM

## 2025-02-26 DIAGNOSIS — K91.2 POSTSURGICAL MALABSORPTION: Primary | ICD-10-CM

## 2025-02-27 ENCOUNTER — OFFICE VISIT (OUTPATIENT)
Dept: BARIATRICS | Facility: CLINIC | Age: 35
End: 2025-02-27
Payer: COMMERCIAL

## 2025-02-27 ENCOUNTER — APPOINTMENT (OUTPATIENT)
Dept: LAB | Facility: HOSPITAL | Age: 35
End: 2025-02-27
Payer: COMMERCIAL

## 2025-02-27 VITALS
RESPIRATION RATE: 16 BRPM | SYSTOLIC BLOOD PRESSURE: 114 MMHG | HEART RATE: 47 BPM | HEIGHT: 63 IN | WEIGHT: 217 LBS | BODY MASS INDEX: 38.45 KG/M2 | DIASTOLIC BLOOD PRESSURE: 70 MMHG

## 2025-02-27 DIAGNOSIS — I10 HYPERTENSION, UNSPECIFIED TYPE: ICD-10-CM

## 2025-02-27 DIAGNOSIS — F33.41 RECURRENT MAJOR DEPRESSIVE DISORDER, IN PARTIAL REMISSION (HCC): ICD-10-CM

## 2025-02-27 DIAGNOSIS — E11.9 TYPE 2 DIABETES MELLITUS WITHOUT COMPLICATION, WITHOUT LONG-TERM CURRENT USE OF INSULIN (HCC): ICD-10-CM

## 2025-02-27 DIAGNOSIS — E66.01 OBESITY, CLASS III, BMI 40-49.9 (MORBID OBESITY) (HCC): ICD-10-CM

## 2025-02-27 DIAGNOSIS — K91.2 POSTSURGICAL MALABSORPTION: ICD-10-CM

## 2025-02-27 DIAGNOSIS — Z48.815 ENCOUNTER FOR SURGICAL AFTERCARE FOLLOWING SURGERY OF DIGESTIVE SYSTEM: Primary | ICD-10-CM

## 2025-02-27 LAB
25(OH)D3 SERPL-MCNC: 45.1 NG/ML (ref 30–100)
ALBUMIN SERPL BCG-MCNC: 4.4 G/DL (ref 3.5–5)
ALP SERPL-CCNC: 62 U/L (ref 34–104)
ALT SERPL W P-5'-P-CCNC: 13 U/L (ref 7–52)
ANION GAP SERPL CALCULATED.3IONS-SCNC: 8 MMOL/L (ref 4–13)
AST SERPL W P-5'-P-CCNC: 17 U/L (ref 13–39)
BASOPHILS # BLD AUTO: 0.05 THOUSANDS/ÂΜL (ref 0–0.1)
BASOPHILS NFR BLD AUTO: 1 % (ref 0–1)
BILIRUB SERPL-MCNC: 0.53 MG/DL (ref 0.2–1)
BUN SERPL-MCNC: 13 MG/DL (ref 5–25)
CALCIUM SERPL-MCNC: 9.4 MG/DL (ref 8.4–10.2)
CHLORIDE SERPL-SCNC: 112 MMOL/L (ref 96–108)
CHOLEST SERPL-MCNC: 185 MG/DL (ref ?–200)
CO2 SERPL-SCNC: 23 MMOL/L (ref 21–32)
CREAT SERPL-MCNC: 0.56 MG/DL (ref 0.6–1.3)
EOSINOPHIL # BLD AUTO: 0.36 THOUSAND/ÂΜL (ref 0–0.61)
EOSINOPHIL NFR BLD AUTO: 6 % (ref 0–6)
ERYTHROCYTE [DISTWIDTH] IN BLOOD BY AUTOMATED COUNT: 14 % (ref 11.6–15.1)
EST. AVERAGE GLUCOSE BLD GHB EST-MCNC: 91 MG/DL
FERRITIN SERPL-MCNC: 54 NG/ML (ref 11–307)
FOLATE SERPL-MCNC: 11.3 NG/ML
GFR SERPL CREATININE-BSD FRML MDRD: 119 ML/MIN/1.73SQ M
GLUCOSE P FAST SERPL-MCNC: 88 MG/DL (ref 65–99)
HBA1C MFR BLD: 4.8 %
HCT VFR BLD AUTO: 38.9 % (ref 34.8–46.1)
HDLC SERPL-MCNC: 39 MG/DL
HGB BLD-MCNC: 12.7 G/DL (ref 11.5–15.4)
IMM GRANULOCYTES # BLD AUTO: 0.01 THOUSAND/UL (ref 0–0.2)
IMM GRANULOCYTES NFR BLD AUTO: 0 % (ref 0–2)
IRON SATN MFR SERPL: 21 % (ref 15–50)
IRON SERPL-MCNC: 60 UG/DL (ref 50–212)
LDLC SERPL CALC-MCNC: 124 MG/DL (ref 0–100)
LYMPHOCYTES # BLD AUTO: 1.96 THOUSANDS/ÂΜL (ref 0.6–4.47)
LYMPHOCYTES NFR BLD AUTO: 32 % (ref 14–44)
MCH RBC QN AUTO: 29.7 PG (ref 26.8–34.3)
MCHC RBC AUTO-ENTMCNC: 32.6 G/DL (ref 31.4–37.4)
MCV RBC AUTO: 91 FL (ref 82–98)
MONOCYTES # BLD AUTO: 0.43 THOUSAND/ÂΜL (ref 0.17–1.22)
MONOCYTES NFR BLD AUTO: 7 % (ref 4–12)
NEUTROPHILS # BLD AUTO: 3.33 THOUSANDS/ÂΜL (ref 1.85–7.62)
NEUTS SEG NFR BLD AUTO: 54 % (ref 43–75)
NONHDLC SERPL-MCNC: 146 MG/DL
NRBC BLD AUTO-RTO: 0 /100 WBCS
PLATELET # BLD AUTO: 234 THOUSANDS/UL (ref 149–390)
PMV BLD AUTO: 10.9 FL (ref 8.9–12.7)
POTASSIUM SERPL-SCNC: 3.6 MMOL/L (ref 3.5–5.3)
PROT SERPL-MCNC: 6.9 G/DL (ref 6.4–8.4)
PTH-INTACT SERPL-MCNC: 33.6 PG/ML (ref 12–88)
RBC # BLD AUTO: 4.27 MILLION/UL (ref 3.81–5.12)
SODIUM SERPL-SCNC: 143 MMOL/L (ref 135–147)
TIBC SERPL-MCNC: 289.8 UG/DL (ref 250–450)
TRANSFERRIN SERPL-MCNC: 207 MG/DL (ref 203–362)
TRIGL SERPL-MCNC: 109 MG/DL (ref ?–150)
TSH SERPL DL<=0.05 MIU/L-ACNC: 1.67 UIU/ML (ref 0.45–4.5)
UIBC SERPL-MCNC: 230 UG/DL (ref 155–355)
VIT B12 SERPL-MCNC: 434 PG/ML (ref 180–914)
WBC # BLD AUTO: 6.14 THOUSAND/UL (ref 4.31–10.16)

## 2025-02-27 PROCEDURE — 83550 IRON BINDING TEST: CPT

## 2025-02-27 PROCEDURE — 84630 ASSAY OF ZINC: CPT

## 2025-02-27 PROCEDURE — 84425 ASSAY OF VITAMIN B-1: CPT

## 2025-02-27 PROCEDURE — 83036 HEMOGLOBIN GLYCOSYLATED A1C: CPT

## 2025-02-27 PROCEDURE — 83970 ASSAY OF PARATHORMONE: CPT

## 2025-02-27 PROCEDURE — 99214 OFFICE O/P EST MOD 30 MIN: CPT | Performed by: PHYSICIAN ASSISTANT

## 2025-02-27 PROCEDURE — 80061 LIPID PANEL: CPT

## 2025-02-27 PROCEDURE — 80053 COMPREHEN METABOLIC PANEL: CPT

## 2025-02-27 PROCEDURE — 82728 ASSAY OF FERRITIN: CPT

## 2025-02-27 PROCEDURE — 82746 ASSAY OF FOLIC ACID SERUM: CPT

## 2025-02-27 PROCEDURE — 82607 VITAMIN B-12: CPT

## 2025-02-27 PROCEDURE — 84443 ASSAY THYROID STIM HORMONE: CPT

## 2025-02-27 PROCEDURE — 36415 COLL VENOUS BLD VENIPUNCTURE: CPT

## 2025-02-27 PROCEDURE — 83540 ASSAY OF IRON: CPT

## 2025-02-27 PROCEDURE — 85025 COMPLETE CBC W/AUTO DIFF WBC: CPT

## 2025-02-27 PROCEDURE — 84590 ASSAY OF VITAMIN A: CPT

## 2025-02-27 PROCEDURE — 82306 VITAMIN D 25 HYDROXY: CPT

## 2025-02-27 NOTE — PROGRESS NOTES
Assessment/Plan:    Encounter for surgical aftercare following surgery of digestive system  -s/p Emmanuel-En-Y Gastric Bypass with Dr. Duque on 08/05/24. Weight loss is very good and much improved this last 3 months. She will continue with healthy diet and increase exercise and f/u with RD and LCSW now and me in 3 months.    Initial: 301lbs  Current: 217lbs  EWL: 52%  Edgar: current  Current BMI is Body mass index is 39.06 kg/m².    Tolerating a regular diet-yes  Eating at least 60 grams of protein per day-yes  Following 30/60 minute rule with liquids-yes  Drinking at least 64 ounces of fluid per day-no but close  Drinking carbonated beverages-no  Sufficient exercise-yes  Using NSAIDs regularly-no  Using nicotine-no  Using alcohol-no. Advised about the risks of alcohol s/p bariatric surgery and recommend avoiding all alcohol      Postsurgical malabsorption  -At risk for malabsorption of vitamins/minerals secondary to malabsorption and restriction of intake from bariatric surgery  -Currently taking adequate postop bariatric surgery vitamin supplementation: Bimal MVI with 45mg iron, calcium citrate 500mg TID    -Reviewed labs from 02/27/25 - folate low normal - take methylated folate daily and B12 low normal - take extra 1,000mcg sublingual daily    -Obtain CBC/Metabolic panel in 6 months  -Patient received education about the importance of adhering to a lifelong supplementation regimen to avoid vitamin/mineral deficiencies       HTN (hypertension)  -no medications; stable today  -Continue monitoring and management with prescribing provider      Type 2 diabetes mellitus without complication, without long-term current use of insulin (HCC)    Lab Results   Component Value Date    HGBA1C 5.1 11/14/2024     -diet controlled - continue healthy diet and exercise and repeat HgbA1C now    Recurrent major depressive disorder, in partial remission (HCC)  -on zoloft but struggling with depression  -Continue monitoring and management  with prescribing provider  -Encouraged consistent f/u with therapist/counselor and offered LCSW services here  -Encouraged stress reduction techniques, exercise - red light therapy and start methylated folate       Diagnoses and all orders for this visit:    Encounter for surgical aftercare following surgery of digestive system    Postsurgical malabsorption  -     CBC and differential; Future  -     Folate; Future  -     Comprehensive metabolic panel; Future  -     Hemoglobin A1C; Future  -     Iron Panel (Includes Ferritin, Iron Sat%, Iron, and TIBC); Future  -     Vitamin A; Future  -     TSH, 3rd generation with Free T4 reflex; Future  -     PTH, intact; Future  -     Lipid panel; Future  -     Vitamin B1, whole blood; Future  -     Vitamin B12; Future  -     Vitamin D 25 hydroxy; Future  -     Zinc; Future    Hypertension, unspecified type    Type 2 diabetes mellitus without complication, without long-term current use of insulin (HCC)  -     Hemoglobin A1C; Future    Recurrent major depressive disorder, in partial remission (HCC)          Subjective:      Patient ID: Kiana Kapoor is a 35 y.o. female.    -s/p Emmanuel-En-Y Gastric Bypass with Dr. Duque on 08/05/24. Presents to the office today for routine follow up. Tolerating diet without issues; denies N/V, dysphagia, reflux. Overall doing Well.    She notes cravings especially for bread - worse pre-menstrual. Struggling with depression - this occurred prior to surgery. On zoloft - waitlist for psych. Sees therapist biweekly.    She has 6 siblings. 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  She has 4 cats and 1 dog.  spouse José. No kids yet but hope to in future.     Diet Recall:   B - overnight oats w/ protein and Greek yogurt or protein shake  L - tuna pouch or chicken and veggies  D - protein and veggie and small potato    Fluids - 50oz water    The following portions of the patient's history were  "reviewed and updated as appropriate: allergies, current medications, past family history, past medical history, past social history, past surgical history and problem list.    Review of Systems   Constitutional:  Negative for chills and fever. Unexpected weight change: planned weight loss.  HENT:  Negative for trouble swallowing.    Respiratory:  Negative for cough and shortness of breath.    Cardiovascular:  Negative for chest pain and palpitations.   Gastrointestinal:  Negative for abdominal pain, constipation, diarrhea, nausea and vomiting.   Neurological:  Negative for dizziness.   Psychiatric/Behavioral:          +Depression         Objective:      /70 (BP Location: Left arm, Patient Position: Sitting, Cuff Size: Large)   Pulse (!) 47   Resp 16   Ht 5' 2.5\" (1.588 m)   Wt 98.4 kg (217 lb)   BMI 39.06 kg/m²     Colonoscopy-Not applicable       Physical Exam  Vitals reviewed.   Constitutional:       General: She is not in acute distress.     Appearance: She is well-developed.   HENT:      Head: Normocephalic and atraumatic.   Eyes:      General: No scleral icterus.  Cardiovascular:      Rate and Rhythm: Normal rate and regular rhythm.   Pulmonary:      Effort: Pulmonary effort is normal. No respiratory distress.   Abdominal:      General: There is no distension.   Skin:     General: Skin is warm and dry.   Neurological:      Mental Status: She is alert.   Psychiatric:         Mood and Affect: Mood normal.         Behavior: Behavior normal.           BARRIERS: none identified    GOALS:   Continued/Maintain healthy weight loss with good nutrition intakes.  Adequate hydration with at least 64oz. fluid intake.  Normal vitamin and mineral levels.  Exercise as tolerated.  Life Extension Optimized Folate - 1 tab daily and take 1,000mcg sublingual B12 daily    Follow-up in 3 months. We kindly ask that your arrive 15 minutes before your scheduled appointment time with your provider to allow our staff to room " you, get your vital signs and update your chart.    Follow diet as discussed.      Get lab work done in 6 months.  You have been given a lab slip today.  Please call the office if you need a replacement.  It is recommended to check with your insurance BEFORE getting labs done to make sure they are covered by your policy.  Also, please check with your PCP and other providers before getting labs to avoid duplicate labs. Make sure to HOLD any multivitamins that may contain biotin and any biotin supplements FOR 5 DAYS before any labs since it can affect the results.    Follow vitamin and mineral recommendations as reviewed with you.    Call our office if you have any problems with abdominal pain especially associated with fever, chills, nausea, vomiting or any other concerns.    All  Post-bariatric surgery patients should be aware that very small quantities of any alcohol can cause impairment and it is very possible not to feel the effect. The effect can be in the system for several hours.  It is also a stomach irritant.     It is advised to AVOID alcohol, Nonsteroidal antiinflammatory drugs (NSAIDS) and nicotine of all forms . Any of these can cause stomach irritation/pain.

## 2025-02-27 NOTE — ASSESSMENT & PLAN NOTE
-on zoloft but struggling with depression  -Continue monitoring and management with prescribing provider  -Encouraged consistent f/u with therapist/counselor and offered LCSW services here  -Encouraged stress reduction techniques, exercise - red light therapy and start methylated folate

## 2025-02-27 NOTE — PATIENT INSTRUCTIONS
GOALS:   Continued/Maintain healthy weight loss with good nutrition intakes.  Adequate hydration with at least 64oz. fluid intake.  Normal vitamin and mineral levels.  Exercise as tolerated.  Life Extension Optimized Folate - 1 tab daily and take 1,000mcg sublingual B12 daily    Follow-up in 3 months. We kindly ask that your arrive 15 minutes before your scheduled appointment time with your provider to allow our staff to room you, get your vital signs and update your chart.    Follow diet as discussed.      Get lab work done in 6 months.  You have been given a lab slip today.  Please call the office if you need a replacement.  It is recommended to check with your insurance BEFORE getting labs done to make sure they are covered by your policy.  Also, please check with your PCP and other providers before getting labs to avoid duplicate labs. Make sure to HOLD any multivitamins that may contain biotin and any biotin supplements FOR 5 DAYS before any labs since it can affect the results.    Follow vitamin and mineral recommendations as reviewed with you.    Call our office if you have any problems with abdominal pain especially associated with fever, chills, nausea, vomiting or any other concerns.    All  Post-bariatric surgery patients should be aware that very small quantities of any alcohol can cause impairment and it is very possible not to feel the effect. The effect can be in the system for several hours.  It is also a stomach irritant.     It is advised to AVOID alcohol, Nonsteroidal antiinflammatory drugs (NSAIDS) and nicotine of all forms . Any of these can cause stomach irritation/pain.

## 2025-03-04 ENCOUNTER — RESULTS FOLLOW-UP (OUTPATIENT)
Dept: BARIATRICS | Facility: CLINIC | Age: 35
End: 2025-03-04

## 2025-03-04 DIAGNOSIS — K91.2 POSTSURGICAL MALABSORPTION: Primary | ICD-10-CM

## 2025-03-04 DIAGNOSIS — E50.9 VITAMIN A DEFICIENCY: ICD-10-CM

## 2025-03-04 LAB
VIT A SERPL-MCNC: 19 UG/DL (ref 18.9–57.3)
VIT B1 BLD-SCNC: 124 NMOL/L (ref 66.5–200)
ZINC SERPL-MCNC: 97 UG/DL (ref 44–115)

## 2025-03-04 RX ORDER — VITAMIN A 3000 MCG
10000 CAPSULE ORAL DAILY
Qty: 90 CAPSULE | Refills: 0 | Status: SHIPPED | OUTPATIENT
Start: 2025-03-04

## 2025-03-04 NOTE — RESULT ENCOUNTER NOTE
Your labs show that your Vitamin A levels are low.   This can affect your night vision. I recommend that you take an additional 10,000 IU of retinyl acetate or retinyl palmitate (Vitamin A) per day daily with FOOD x 90 days ONLY. It is important that you take an actual vitamin A supplement for repletion, and not a carotene based supplement. I am sending a script, but if not available or covered, it can be found inexpensively online or at your pharmacy.  Let me know if you have any issues finding it.    Your levels should be rechecked in 3 months. Long term vitamin A supplementation can be toxic, so it is important to discontinue your vitamin A supplementation after your levels have improved. If you experience the following symptoms, please notify the office immediately: nausea, vomiting, blurred vision, headache, and vertigo.

## 2025-03-10 NOTE — PROGRESS NOTES
"Bariatric Nutrition Progress Note    S/P RNY - Dr. Parsons on 8/5/2024      Height: 5'2.5\"    Current Weight: 217# (last office visit)   BMI: 39.1  Weight Prior to Weight Loss Surgery: 303#   BMI: 54.5  (337 # highest)   Weight in (lb) to have BMI = 25: 139#  ELVIN:current  Regain: N/A    Vitamin Regimen:  Bimal MVI with 45mg iron, calcium citrate 500mg TID   11/14/24 - Vitamin D low normal - add extra 2,000IU D3 daily       Diet and exercise:    Tolerating a regular diet - Yes  3 meals: Yes  Snacking/grazing- No  Volume: at 4 months 1/3 cup Yes  Eating at least 60 grams of protein per day- Yes  Protein drinks: Yes - [employer]   Following 30/60 minute rule with liquids-yes  Eating/drinking: chewing food well- sipping fluids Yes  Drinking at least 64 ounces of fluid per day- Yes  Drinking carbonated beverages-no  Food logging- [employer] Helps to keep structure  GI discomforts No  Exercise: - keep active and has stationary bike   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  She has 4 cats and 1 dog.  spouse José. No kids yet but hope to in future.       Diet Recall:   B - oatmeal or cheese and meat or eggs - Ratio Yogurt or leftovers  L - tuna and quinoa and veggies  D - 1.5 chicken thighs and 1-2 Tbsp veggies and/or starch  Ground beef and pork roast or veggie burger     Fluids - 40oz water, 1 protein shake, 2 SF ice pops     Visit Summary 12/19/2024  4 months post op. Seen Dayami for 3 month post op in November. Concerned with frequent stalls and whether weight loss on target. Had discussion and pt averaging 3#/ week  - Uses variety of protein shakes and focuses on protein. Volume per meal appropriate per current phase. Calories 700-900 - Protein 60-70 grams Advised to keep calories under 800. Fluids near 64 oz . Taking vitamins as instructed.  Answered and addressed  pt's questions./concerns. Also advised on  " holiday eating. Pt receptive    Visit Summary 3/12/2025  7 months post op. Menstrual bloat so deferred weight check. Overall pleased with her weight loss journey.  Reports  supportive and losing weight as eating healthier   Discusses NSVs:  clearing room to remodel and paint, enjoys her  plants and like to garden. Purchased green house with goal to can fruits and vegetables for winter months.  Also raises chickens. Taking vitamins Vitamin A low and to start script. Struggles some with 30/60 - does wait 45 minutes and tries to distract herself . Volume at 1/2 cup - appropriate.Tracks intermittently  Some hair loss but not excessive. . Got collagen and to try to incorporate. Experienced dumping few times so identifies foods to avoid.  - Exercise - doing more balance  & strength as well as cardio. Questions/concerns addressed during discussion. Pt receptive  Goals  Keep to post op guidelines  Increase protein (70-80 grams)  Maintain/hydration ( 64 oz )  Avoid grazing  Volume  goal at 1/2-3/4 c per meals - Macro goals as discussed  Keep to vitamin regimen as advised ( Bimal Multi with 45 mg Iron and 1500 mg Calcium)   - Add Vitamin D 2000 IU  Increase physical activity and exercise   F/U RD in 3 months      Time Spent : 45 minutes

## 2025-03-12 ENCOUNTER — CLINICAL SUPPORT (OUTPATIENT)
Dept: BARIATRICS | Facility: CLINIC | Age: 35
End: 2025-03-12

## 2025-03-12 DIAGNOSIS — K91.2 POSTSURGICAL MALABSORPTION: Primary | ICD-10-CM

## 2025-03-12 PROCEDURE — RECHECK

## 2025-04-15 DIAGNOSIS — Z01.818 ENCOUNTER FOR OTHER PREPROCEDURAL EXAMINATION: ICD-10-CM

## 2025-04-15 DIAGNOSIS — F33.41 RECURRENT MAJOR DEPRESSIVE DISORDER, IN PARTIAL REMISSION (HCC): ICD-10-CM

## 2025-04-15 RX ORDER — OMEPRAZOLE 20 MG/1
20 CAPSULE, DELAYED RELEASE ORAL DAILY
Qty: 90 CAPSULE | Refills: 1 | Status: SHIPPED | OUTPATIENT
Start: 2025-04-15

## 2025-04-16 RX ORDER — SERTRALINE HYDROCHLORIDE 100 MG/1
150 TABLET, FILM COATED ORAL DAILY
Qty: 135 TABLET | Refills: 1 | Status: SHIPPED | OUTPATIENT
Start: 2025-04-16

## 2025-04-16 NOTE — TELEPHONE ENCOUNTER
Pharmacy called to verify that the script was received electronically.   Pharmacy states that patient should be out of the medication based off of the last refill.      15

## 2025-05-07 NOTE — PROGRESS NOTES
"Post op support.   Has been struggling with feeling that she still looks the same as she did prior to surgery. Still feels \"massive\", but also feels that people treat her differently now that she has lost weight, which makes her angry. Doesn't like looking in the mirror because of the excess skin. Suggested getting a bra fitting.  Has been feeling much better. Has been walking at work on lunch. More \"antsy\" at work and not liking to sit still due to being more productive now. Also likes to get up and find something to do rather than sitting more and procrastinating. Thinking about going back to boxing and starting to do more strength training at home. One of our patient's got hired at her job, which has been really helpful. Still seeing her therapist every 2 weeks- work on Has been working on weaning off of the Diamox for the IIH. Going to see if the weight loss improved the IIH and worried about the side effects of hearing loss. Can't get pregnant until she is off the medication. Still on the waitlist for psychiatrist. Has not reduced the dosage of Zoloft since she had surgery. Has been having 3 meals per day. Drinking 60 oz of water, protein shake, 5 oz coffee. Has been taking her MVI regularly, but not always consistent with the calcium. Patient to follow up with BIPINW in 2 months.  Kady Roa, MADY  "

## 2025-05-08 ENCOUNTER — CLINICAL SUPPORT (OUTPATIENT)
Dept: BARIATRICS | Facility: CLINIC | Age: 35
End: 2025-05-08

## 2025-05-08 VITALS — BODY MASS INDEX: 35.82 KG/M2 | WEIGHT: 199 LBS

## 2025-05-08 DIAGNOSIS — Z98.84 BARIATRIC SURGERY STATUS: Primary | ICD-10-CM

## 2025-05-08 DIAGNOSIS — F43.23 ADJUSTMENT DISORDER WITH MIXED ANXIETY AND DEPRESSED MOOD: ICD-10-CM

## 2025-05-08 PROCEDURE — RECHECK

## 2025-05-27 ENCOUNTER — OFFICE VISIT (OUTPATIENT)
Dept: FAMILY MEDICINE CLINIC | Facility: CLINIC | Age: 35
End: 2025-05-27
Payer: COMMERCIAL

## 2025-05-27 VITALS
TEMPERATURE: 97 F | BODY MASS INDEX: 34.66 KG/M2 | OXYGEN SATURATION: 97 % | WEIGHT: 195.6 LBS | HEIGHT: 63 IN | RESPIRATION RATE: 16 BRPM | HEART RATE: 48 BPM | DIASTOLIC BLOOD PRESSURE: 74 MMHG | SYSTOLIC BLOOD PRESSURE: 116 MMHG

## 2025-05-27 DIAGNOSIS — H69.93 DYSFUNCTION OF BOTH EUSTACHIAN TUBES: ICD-10-CM

## 2025-05-27 DIAGNOSIS — F32.2 SEVERE MAJOR DEPRESSIVE DISORDER (HCC): ICD-10-CM

## 2025-05-27 DIAGNOSIS — J01.00 ACUTE NON-RECURRENT MAXILLARY SINUSITIS: Primary | ICD-10-CM

## 2025-05-27 PROCEDURE — 99213 OFFICE O/P EST LOW 20 MIN: CPT | Performed by: NURSE PRACTITIONER

## 2025-05-27 RX ORDER — FLUTICASONE PROPIONATE 50 MCG
1 SPRAY, SUSPENSION (ML) NASAL DAILY
Qty: 18.2 ML | Refills: 1 | Status: SHIPPED | OUTPATIENT
Start: 2025-05-27

## 2025-05-27 RX ORDER — METHYLPREDNISOLONE 4 MG/1
TABLET ORAL
Qty: 21 EACH | Refills: 0 | Status: SHIPPED | OUTPATIENT
Start: 2025-05-27

## 2025-05-27 NOTE — PROGRESS NOTES
Name: Kiana Kapoor      : 1990      MRN: [id number]  Encounter Provider: CONSTANTIN Farah  Encounter Date: 2025   Encounter department: [facility] [address]  :  Assessment & Plan  Acute non-recurrent maxillary sinusitis  Advised saline rinses twice daily, steam, humidified/moist air, to avoid irritants when possible.  Advised can start over-the-counter allergy medication when completed with Augmentin and Medrol Dosepak.  Medication as prescribed.    Orders:    amoxicillin-clavulanate (AUGMENTIN) 875-125 mg per tablet; Take 1 tablet by mouth every 12 (twelve) hours for 7 days    methylPREDNISolone 4 MG tablet therapy pack; Use as directed on package    Dysfunction of both eustachian tubes  Advised saline rinses twice daily, steam, humidified/moist air, avoid irritants when possible.  To start over-the-counter allergy medication.  To start Flonase once completed with Medrol Dosepak.  Orders:    fluticasone (FLONASE) 50 mcg/act nasal spray; 1 spray into each nostril daily    Severe major depressive disorder (HCC)  Depression Screening Follow-up Plan: Patient's depression screening was positive with a PHQ-9 score of 9. Patient with underlying depression and was advised to continue current medications as prescribed. Continue regular follow-up with their psychologist/therapist/psychiatrist who is managing their mental health condition(s).    Stable on sertraline.  To continue follow-up with PCP who is managing medications until she gets into psychiatrist.  To continue with behavioral health therapy appointments.             Depression Screening and Follow-up Plan: Patient's depression screening was positive with a PHQ-9 score of 9.   Continue regular follow-up with their mental health provider who is managing their mental health condition(s). Patient with underlying depression and was advised to continue current medications as prescribed.       History of Present  "Illness   Patient presents to the office for evaluation of bilateral ear pressure and sinus pain.  Per patient, has had sensation in both ears for over a month.  He developed increasing sinus pressure.  Patient notes worsening congestion in the morning with postnasal drip.  Denies fever, chills, sore throat.  Denies loss of hearing or tinnitus, or dizziness.      Earache   There is pain in both ears. This is a chronic problem. The current episode started more than 1 month ago. The problem occurs every few hours. The problem has been waxing and waning. There has been no fever. The fever has been present for Less than 1 day. The pain is at a severity of 3/10. Associated symptoms include headaches, neck pain and rhinorrhea. Pertinent negatives include no coughing, diarrhea, ear discharge, hearing loss, rash, sore throat or vomiting.   Sinus Problem  There has been no fever. Associated symptoms include ear pain, headaches, neck pain and sinus pressure. Pertinent negatives include no chills, coughing or sore throat.     Review of Systems   Constitutional:  Negative for chills and fever.   HENT:  Positive for ear pain, rhinorrhea and sinus pressure. Negative for ear discharge, hearing loss and sore throat.    Eyes:  Negative for photophobia and visual disturbance.   Respiratory:  Negative for cough.    Cardiovascular:  Negative for chest pain and palpitations.   Gastrointestinal:  Negative for diarrhea and vomiting.   Musculoskeletal:  Positive for neck pain. Negative for neck stiffness.   Skin:  Negative for rash.   Neurological:  Positive for headaches. Negative for dizziness, weakness, light-headedness and numbness.   Hematological:  Negative for adenopathy.   Psychiatric/Behavioral:  Negative for confusion.        Objective   /74 (BP Location: Left arm, Cuff Size: Large)   Pulse (!) 48   Temp (!) 97 °F (36.1 °C)   Resp 16   Ht 5' 2.5\" (1.588 m)   Wt 88.7 kg (195 lb 9.6 oz)   LMP 05/19/2025 (Approximate)   " SpO2 97%   BMI 35.21 kg/m²      Physical Exam  Vitals reviewed.   Constitutional:       General: She is not in acute distress.     Appearance: She is not ill-appearing.   HENT:      Head: Normocephalic and atraumatic.      Right Ear: Hearing, ear canal and external ear normal. A middle ear effusion is present. Tympanic membrane is not erythematous.      Left Ear: Hearing, ear canal and external ear normal. A middle ear effusion is present. Tympanic membrane is not erythematous.      Nose: Congestion present.      Right Sinus: Maxillary sinus tenderness and frontal sinus tenderness present.      Left Sinus: Maxillary sinus tenderness present. No frontal sinus tenderness.     Eyes:      Conjunctiva/sclera: Conjunctivae normal.      Pupils: Pupils are equal, round, and reactive to light.       Cardiovascular:      Rate and Rhythm: Regular rhythm. Bradycardia present.      Pulses: Normal pulses.      Heart sounds: Normal heart sounds. No murmur heard.  Pulmonary:      Effort: Pulmonary effort is normal.      Breath sounds: Normal breath sounds. No wheezing.     Musculoskeletal:         General: Normal range of motion.      Cervical back: Normal range of motion and neck supple.   Lymphadenopathy:      Cervical: No cervical adenopathy.     Skin:     General: Skin is warm and dry.     Neurological:      General: No focal deficit present.      Mental Status: She is alert and oriented to person, place, and time.     Psychiatric:         Mood and Affect: Mood normal.         Behavior: Behavior normal.

## 2025-05-27 NOTE — ASSESSMENT & PLAN NOTE
Depression Screening Follow-up Plan: Patient's depression screening was positive with a PHQ-9 score of 9. Patient with underlying depression and was advised to continue current medications as prescribed. Continue regular follow-up with their psychologist/therapist/psychiatrist who is managing their mental health condition(s).    Stable on sertraline.  To continue follow-up with PCP who is managing medications until she gets into psychiatrist.  To continue with behavioral health therapy appointments.

## 2025-06-10 ENCOUNTER — OFFICE VISIT (OUTPATIENT)
Dept: BARIATRICS | Facility: CLINIC | Age: 35
End: 2025-06-10
Payer: COMMERCIAL

## 2025-06-10 VITALS
BODY MASS INDEX: 33.66 KG/M2 | HEIGHT: 63 IN | WEIGHT: 190 LBS | OXYGEN SATURATION: 98 % | HEART RATE: 86 BPM | SYSTOLIC BLOOD PRESSURE: 106 MMHG | DIASTOLIC BLOOD PRESSURE: 72 MMHG

## 2025-06-10 DIAGNOSIS — K91.2 POSTSURGICAL MALABSORPTION: ICD-10-CM

## 2025-06-10 DIAGNOSIS — Z48.815 ENCOUNTER FOR SURGICAL AFTERCARE FOLLOWING SURGERY OF DIGESTIVE SYSTEM: Primary | ICD-10-CM

## 2025-06-10 PROCEDURE — 99214 OFFICE O/P EST MOD 30 MIN: CPT | Performed by: PHYSICIAN ASSISTANT

## 2025-06-10 NOTE — PROGRESS NOTES
Assessment/Plan:    Encounter for surgical aftercare following surgery of digestive system  -s/p Emmanuel-En-Y Gastric Bypass with Dr. Duque on 08/05/24. Weight loss is very good and much improved this last 3 months. She will continue with healthy diet and increase exercise and f/u with RD and LCSW now and me in 3 months.    Initial: 301lbs  Current: 190.2lbs  EWL: 69%  Edgar: current  Current BMI is Body mass index is 34.2 kg/m².    Tolerating a regular diet-yes  Eating at least 60 grams of protein per day-yes  Following 30/60 minute rule with liquids-yes  Drinking at least 64 ounces of fluid per day-no but close  Drinking carbonated beverages-no  Sufficient exercise-yes  Using NSAIDs regularly-no  Using nicotine-no  Using alcohol-no. Advised about the risks of alcohol s/p bariatric surgery and recommend avoiding all alcohol      Postsurgical malabsorption  -At risk for malabsorption of vitamins/minerals secondary to malabsorption and restriction of intake from bariatric surgery  -Currently taking adequate postop bariatric surgery vitamin supplementation: Bimal MVI with 45mg iron, calcium citrate 500mg TID    -Reviewed labs from 02/27/25 - folate low normal - take methylated folate daily and B12 low normal - take extra 1,000mcg sublingual daily    -Obtain CBC/Metabolic panel in 3 months  -Patient received education about the importance of adhering to a lifelong supplementation regimen to avoid vitamin/mineral deficiencies        Diagnoses and all orders for this visit:    Encounter for surgical aftercare following surgery of digestive system    Postsurgical malabsorption          Subjective:      Patient ID: Kiana Kapoor is a 35 y.o. female.    -s/p Emmanuel-En-Y Gastric Bypass with Dr. Duque on 08/05/24. Presents to the office today for routine follow up. Tolerating diet without issues; denies N/V, dysphagia, reflux. Overall doing Well.    Has severe PMS symptoms - worse this week.   Struggling with depression - this  "occurred prior to surgery. On zoloft - waitlist for psych. Sees therapist biweekly. Cravings for bread/CHO not as strong. Follows with RD and LCSW monthly.    She has 6 siblings. 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  She has 4 cats and 1 dog.  spouse José. No kids yet but hope to in future. They have chickens - many eggs!    Diet Recall:   B - Greek yogurt and protein granola  L - leftovers or rolled up deli meat and cheese  Snack - protein shake  D - protein and veggie or PB2 high fiber bread and jelly on occasion or eggs    Fluids - 1 coffee, 1 protein shake 48oz water    The following portions of the patient's history were reviewed and updated as appropriate: allergies, current medications, past family history, past medical history, past social history, past surgical history and problem list.    Review of Systems   Constitutional:  Negative for chills and fever. Unexpected weight change: planned weight loss.  HENT:  Negative for trouble swallowing.    Respiratory:  Negative for cough and shortness of breath.    Cardiovascular:  Negative for chest pain and palpitations.   Gastrointestinal:  Negative for abdominal pain, constipation, diarrhea, nausea and vomiting.   Neurological:  Negative for dizziness.   Psychiatric/Behavioral:          + depression         Objective:      /72 (BP Location: Left arm, Patient Position: Sitting, Cuff Size: Large)   Pulse 86   Ht 5' 2.5\" (1.588 m)   Wt 86.2 kg (190 lb)   LMP 05/19/2025 (Approximate)   SpO2 98%   BMI 34.20 kg/m²     Colonoscopy-Not applicable       Physical Exam  Vitals reviewed.   Constitutional:       General: She is not in acute distress.     Appearance: She is well-developed.   HENT:      Head: Normocephalic and atraumatic.     Eyes:      General: No scleral icterus.      Cardiovascular:      Rate and Rhythm: Normal rate and regular rhythm.   Pulmonary:      Effort: Pulmonary effort is " normal. No respiratory distress.   Abdominal:      General: Bowel sounds are normal. There is no distension.     Skin:     General: Skin is warm and dry.     Neurological:      Mental Status: She is alert.     Psychiatric:         Mood and Affect: Mood normal.         Behavior: Behavior normal.           BARRIERS: none identified    GOALS:   Continued/Maintain healthy weight loss with good nutrition intakes.  Adequate hydration with at least 64oz. fluid intake.  Normal vitamin and mineral levels.  Exercise as tolerated.    Follow-up in 6 months We kindly ask that your arrive 15 minutes before your scheduled appointment time with your provider to allow our staff to room you, get your vital signs and update your chart.    Follow diet as discussed.      Get lab work done in the next 2 weeks.  You have been given a lab slip today.  Please call the office if you need a replacement.  It is recommended to check with your insurance BEFORE getting labs done to make sure they are covered by your policy.  Also, please check with your PCP and other providers before getting labs to avoid duplicate labs. Make sure to HOLD any multivitamins that may contain biotin and any biotin supplements FOR 5 DAYS before any labs since it can affect the results.    Follow vitamin and mineral recommendations as reviewed with you.    Call our office if you have any problems with abdominal pain especially associated with fever, chills, nausea, vomiting or any other concerns.    All  Post-bariatric surgery patients should be aware that very small quantities of any alcohol can cause impairment and it is very possible not to feel the effect. The effect can be in the system for several hours.  It is also a stomach irritant.     It is advised to AVOID alcohol, Nonsteroidal antiinflammatory drugs (NSAIDS) and nicotine of all forms . Any of these can cause stomach irritation/pain.

## 2025-06-10 NOTE — ASSESSMENT & PLAN NOTE
-s/p Emmanuel-En-Y Gastric Bypass with Dr. Duque on 08/05/24. Weight loss is very good and much improved this last 3 months. She will continue with healthy diet and increase exercise and f/u with RD and LCSW now and me in 3 months.    Initial: 301lbs  Current: 190.2lbs  EWL: 69%  Edgar: current  Current BMI is Body mass index is 34.2 kg/m².    Tolerating a regular diet-yes  Eating at least 60 grams of protein per day-yes  Following 30/60 minute rule with liquids-yes  Drinking at least 64 ounces of fluid per day-no but close  Drinking carbonated beverages-no  Sufficient exercise-yes  Using NSAIDs regularly-no  Using nicotine-no  Using alcohol-no. Advised about the risks of alcohol s/p bariatric surgery and recommend avoiding all alcohol

## 2025-06-12 NOTE — PROGRESS NOTES
"Bariatric Nutrition Progress Note    S/P R[facility] - Dr. Parsons on 8/5/2024      Height: 5'2.5\"    Current Weight: 190.6   BMI: 34.3  Weight Prior to Weight Loss Surgery: 303#   BMI: 54.5  (337 # highest)   Weight in (lb) to have BMI = 25: 139#  ELVIN:current  Regain: N/A    Vitamin Regimen:  Bimal MVI with 45mg iron, calcium citrate 500mg TID   11/14/24 - Vitamin D low normal - add extra 2,000IU D3 daily       Diet and exercise:  Updated 6/13/2025    Tolerating a regular diet - Yes  3 meals: Yes  Snacking/grazing- No  Volume: at 10 months 3/4 cup Yes  Eating at least 60 grams of protein per day- Yes  Protein drinks: Yes - Fairlife or Ensure Max  Following 30/60 minute rule with liquids-yes  Eating/drinking: chewing food well- sipping fluids Yes  Drinking at least 64 ounces of fluid per day- Yes  Drinking carbonated beverages-no  Food logging- [employer] Helps to keep structure  GI discomforts No  Exercise: - keep active and has stationary bike - walks and does strength exercises  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  She has 4 cats and 1 dog.  spouse José. No kids yet but hope to in future.      Visit Summary 12/19/2024  4 months post op. Seen [facility] for 3 month post op in November. Concerned with frequent stalls and whether weight loss on target. Had discussion and pt averaging 3#/ week  - Uses variety of protein shakes and focuses on protein. Volume per meal appropriate per current phase. Calories 700-900 - Protein 60-70 grams Advised to keep calories under 800. Fluids near 64 oz . Taking vitamins as instructed.  Answered and addressed  pt's questions./concerns. Also advised on  holiday eating. Pt receptive  Diet Recall:   B - oatmeal or cheese and meat or eggs - Ratio Yogurt or leftovers  L - tuna and quinoa and veggies  D - 1.5 chicken thighs and 1-2 Tbsp veggies and/or starch  Ground beef and pork " "roast or veggie burger   Fluids - 40oz water, 1 protein shake, 2 SF ice pops    Visit Summary 3/12/2025  7 months post op. Menstrual bloat so deferred weight check. Overall pleased with her weight loss journey.  Reports  supportive and losing weight as eating healthier   Discusses NSVs:  clearing room to remodel and paint, enjoys her  plants and like to garden. Purchased green house with goal to can fruits and vegetables for winter months.  Also raises chickens. Taking vitamins Vitamin A low and to start script. Struggles some with 30/60 - does wait 45 minutes and tries to distract herself . Volume at 1/2 cup - appropriate.Tracks intermittently  Some hair loss but not excessive. . Got collagen and to try to incorporate. Experienced dumping few times so identifies foods to avoid.  - Exercise - doing more balance  & strength as well as cardio. Questions/concerns addressed during discussion. Pt receptive    Visit Summary 6/13/2025  10 months post op. Reports she is doing better and pleased with weight loss.. Has lost 147# from her highest.  Doesn't have a goal weight in mind - would be satisfied at current weight. Discussed NSVs as well. Pt trying to work on strength  and continues to walk for cardio. Does have excess skin that is uncomfortable and develops rashes that pt tries to keep dry. May consider surgery in future especially if worsens. Maintains healthy diet - has 3 meals and snacks as needed. Fluids and protein adequate. Drinks at least 60oz of water and Protein coffee w/ Fairlife 30 or Ensure Max   Better with 30/60. Focuses on protein. Adds collagen few X per week to her tea. 3/4 c Volume. Avoids foods that cause dumping  Recall:   B- Protein Yogurt- granola  L - Turkey cheese roll ups - PB SW on 6-4-7 with PB powder  D - Protein, vegetable (tried rice and pasta - \"lays heavy\"   Snacks if hungry - drinks first     Goals  Keep to post op guidelines  Maintain protein (70-80 grams)  Maintain/hydration ( " 64 oz )  Volume  goal at 3/4-1  c per meals over next 2-3 months - Macro goals as discussed  Keep to vitamin regimen as advised ( Bimal Multi with 45 mg Iron and 1500 mg Calcium)   - Add Vitamin D 2000 IU and script for Vitamin A  Increase physical activity and exercise   F/U RD in 3 months      Time Spent : 45 minutes

## 2025-06-13 ENCOUNTER — CLINICAL SUPPORT (OUTPATIENT)
Dept: BARIATRICS | Facility: CLINIC | Age: 35
End: 2025-06-13

## 2025-06-13 VITALS — HEIGHT: 63 IN | WEIGHT: 190.6 LBS | BODY MASS INDEX: 33.77 KG/M2

## 2025-06-13 DIAGNOSIS — K91.2 POSTSURGICAL MALABSORPTION: Primary | ICD-10-CM

## 2025-06-13 PROCEDURE — RECHECK

## 2025-06-30 ENCOUNTER — TELEPHONE (OUTPATIENT)
Dept: BARIATRICS | Facility: CLINIC | Age: 35
End: 2025-06-30

## 2025-07-08 NOTE — PROGRESS NOTES
"Post op support. Frustrated because her weight has stalled. Back to \"feeling huge\". Weighing herself 2x per week because of fear of losing control. Angry about not being \"taken seriously\" when she was a bigger size. May get into advocacy. Discussed the trauma response- not checking in with herself druing the day. Encouraged her to implement positive coping skills.  Started doing some light strength training. Suggested getting a body comp- scheduled with Annie. Patient to follow up with RD next month.  Kady Roa, MADY          "

## 2025-07-09 ENCOUNTER — CLINICAL SUPPORT (OUTPATIENT)
Dept: BARIATRICS | Facility: CLINIC | Age: 35
End: 2025-07-09

## 2025-07-09 DIAGNOSIS — Z98.84 BARIATRIC SURGERY STATUS: Primary | ICD-10-CM

## 2025-07-09 DIAGNOSIS — F43.23 ADJUSTMENT DISORDER WITH MIXED ANXIETY AND DEPRESSED MOOD: ICD-10-CM

## 2025-07-09 PROCEDURE — RECHECK

## 2025-07-16 ENCOUNTER — CLINICAL SUPPORT (OUTPATIENT)
Dept: BARIATRICS | Facility: CLINIC | Age: 35
End: 2025-07-16
Payer: COMMERCIAL

## 2025-07-16 VITALS — HEIGHT: 63 IN | BODY MASS INDEX: 33.1 KG/M2 | WEIGHT: 186.8 LBS

## 2025-07-16 DIAGNOSIS — R63.5 ABNORMAL WEIGHT GAIN: Primary | ICD-10-CM

## 2025-07-16 PROCEDURE — WEIGHT

## 2025-07-16 PROCEDURE — RECHECK

## 2025-07-16 NOTE — PROGRESS NOTES
Completed a body composition using SECA scale and reviewed results with patient.   Questions asked and answered.

## 2025-07-18 ENCOUNTER — TELEPHONE (OUTPATIENT)
Age: 35
End: 2025-07-18

## 2025-07-18 NOTE — TELEPHONE ENCOUNTER
"Behavioral Health Outpatient Intake Questions    Referred By   : PCP    Please advise interviewee that they need to answer all questions truthfully to allow for best care, and any misrepresentations of information may affect their ability to be seen at this clinic   => Was this discussed? Yes     Behavioral Health Outpatient Intake History -     Presenting Problem (in patient's own words): looking to adjust medications as has lost a lot of weight, pcp recommended    Are there any communication barriers for this patient?     No                                                 Are you taking any psychiatric medications? Yes     If \"YES\" -What are they Zoloft     If \"YES\" -Who prescribes? PCP    Has the Patient previously received outpatient Talk Therapy or Medication Management from [facility]  No     Has the Patient abused alcohol or other substances in the last 6 months ? No  No concerns of substance abuse are reported.    Legal History-     Is this treatment court ordered? No    Has the Patient been convicted of a felony?  No    ACCEPTED as a patient Yes  If \"Yes\" Appointment Date: Np appt virtually 8/26 at 9 am and f/u virtual appt 9/23 at 10 am with Nahomi MARTÍNEZ NP packet, virtual consent, non-discriminatory forms sent via [employer]  Prior auth sent to office    Referred Elsewhere? No    Name of Insurance Co:[employer]  Insurance ID#[id number] & BC: [id number]   Insurance Phone #  If ins is primary or secondary?Johner is primary    "

## 2025-07-23 ENCOUNTER — TELEPHONE (OUTPATIENT)
Dept: PSYCHIATRY | Facility: CLINIC | Age: 35
End: 2025-07-23

## 2025-07-23 NOTE — TELEPHONE ENCOUNTER
Forms sent via [email or web address].  Virtual appt.    Patient is aware that forms must be signed via [email or web address] prior to appt.

## 2025-07-24 ENCOUNTER — APPOINTMENT (OUTPATIENT)
Dept: LAB | Facility: HOSPITAL | Age: 35
End: 2025-07-24
Payer: COMMERCIAL

## 2025-07-24 DIAGNOSIS — E11.9 TYPE 2 DIABETES MELLITUS WITHOUT COMPLICATION, WITHOUT LONG-TERM CURRENT USE OF INSULIN (HCC): ICD-10-CM

## 2025-07-24 DIAGNOSIS — E50.9 VITAMIN A DEFICIENCY: ICD-10-CM

## 2025-07-24 DIAGNOSIS — K91.2 POSTSURGICAL MALABSORPTION: ICD-10-CM

## 2025-07-24 LAB
25(OH)D3 SERPL-MCNC: 42 NG/ML (ref 30–100)
ALBUMIN SERPL BCG-MCNC: 4.8 G/DL (ref 3.5–5)
ALP SERPL-CCNC: 55 U/L (ref 34–104)
ALT SERPL W P-5'-P-CCNC: 14 U/L (ref 7–52)
ANION GAP SERPL CALCULATED.3IONS-SCNC: 8 MMOL/L (ref 4–13)
AST SERPL W P-5'-P-CCNC: 19 U/L (ref 13–39)
BASOPHILS # BLD AUTO: 0.07 THOUSANDS/ÂΜL (ref 0–0.1)
BASOPHILS NFR BLD AUTO: 1 % (ref 0–1)
BILIRUB SERPL-MCNC: 0.68 MG/DL (ref 0.2–1)
BUN SERPL-MCNC: 17 MG/DL (ref 5–25)
CALCIUM SERPL-MCNC: 9.6 MG/DL (ref 8.4–10.2)
CHLORIDE SERPL-SCNC: 112 MMOL/L (ref 96–108)
CHOLEST SERPL-MCNC: 199 MG/DL (ref ?–200)
CO2 SERPL-SCNC: 20 MMOL/L (ref 21–32)
CREAT SERPL-MCNC: 0.64 MG/DL (ref 0.6–1.3)
EOSINOPHIL # BLD AUTO: 0.3 THOUSAND/ÂΜL (ref 0–0.61)
EOSINOPHIL NFR BLD AUTO: 5 % (ref 0–6)
ERYTHROCYTE [DISTWIDTH] IN BLOOD BY AUTOMATED COUNT: 13.2 % (ref 11.6–15.1)
EST. AVERAGE GLUCOSE BLD GHB EST-MCNC: 97 MG/DL
FERRITIN SERPL-MCNC: 41 NG/ML (ref 30–307)
FOLATE SERPL-MCNC: >22.3 NG/ML
GFR SERPL CREATININE-BSD FRML MDRD: 114 ML/MIN/1.73SQ M
GLUCOSE P FAST SERPL-MCNC: 89 MG/DL (ref 65–99)
HBA1C MFR BLD: 5 %
HCT VFR BLD AUTO: 39.4 % (ref 34.8–46.1)
HDLC SERPL-MCNC: 42 MG/DL
HGB BLD-MCNC: 13.3 G/DL (ref 11.5–15.4)
IMM GRANULOCYTES # BLD AUTO: 0.01 THOUSAND/UL (ref 0–0.2)
IMM GRANULOCYTES NFR BLD AUTO: 0 % (ref 0–2)
IRON SATN MFR SERPL: 30 % (ref 15–50)
IRON SERPL-MCNC: 93 UG/DL (ref 50–212)
LDLC SERPL CALC-MCNC: 139 MG/DL (ref 0–100)
LYMPHOCYTES # BLD AUTO: 2.15 THOUSANDS/ÂΜL (ref 0.6–4.47)
LYMPHOCYTES NFR BLD AUTO: 33 % (ref 14–44)
MCH RBC QN AUTO: 30.5 PG (ref 26.8–34.3)
MCHC RBC AUTO-ENTMCNC: 33.8 G/DL (ref 31.4–37.4)
MCV RBC AUTO: 90 FL (ref 82–98)
MONOCYTES # BLD AUTO: 0.54 THOUSAND/ÂΜL (ref 0.17–1.22)
MONOCYTES NFR BLD AUTO: 8 % (ref 4–12)
NEUTROPHILS # BLD AUTO: 3.44 THOUSANDS/ÂΜL (ref 1.85–7.62)
NEUTS SEG NFR BLD AUTO: 53 % (ref 43–75)
NONHDLC SERPL-MCNC: 157 MG/DL
NRBC BLD AUTO-RTO: 0 /100 WBCS
PLATELET # BLD AUTO: 232 THOUSANDS/UL (ref 149–390)
PMV BLD AUTO: 10.8 FL (ref 8.9–12.7)
POTASSIUM SERPL-SCNC: 3.7 MMOL/L (ref 3.5–5.3)
PROT SERPL-MCNC: 7.4 G/DL (ref 6.4–8.4)
PTH-INTACT SERPL-MCNC: 23.4 PG/ML (ref 12–88)
RBC # BLD AUTO: 4.36 MILLION/UL (ref 3.81–5.12)
SODIUM SERPL-SCNC: 140 MMOL/L (ref 135–147)
TIBC SERPL-MCNC: 305.2 UG/DL (ref 250–450)
TRANSFERRIN SERPL-MCNC: 218 MG/DL (ref 203–362)
TRIGL SERPL-MCNC: 89 MG/DL (ref ?–150)
TSH SERPL DL<=0.05 MIU/L-ACNC: 2.46 UIU/ML (ref 0.45–4.5)
UIBC SERPL-MCNC: 212 UG/DL (ref 155–355)
VIT B12 SERPL-MCNC: 329 PG/ML (ref 180–914)
WBC # BLD AUTO: 6.51 THOUSAND/UL (ref 4.31–10.16)

## 2025-07-24 PROCEDURE — 36415 COLL VENOUS BLD VENIPUNCTURE: CPT

## 2025-07-24 PROCEDURE — 83550 IRON BINDING TEST: CPT

## 2025-07-24 PROCEDURE — 82607 VITAMIN B-12: CPT

## 2025-07-24 PROCEDURE — 84425 ASSAY OF VITAMIN B-1: CPT

## 2025-07-24 PROCEDURE — 83970 ASSAY OF PARATHORMONE: CPT

## 2025-07-24 PROCEDURE — 80061 LIPID PANEL: CPT

## 2025-07-24 PROCEDURE — 84590 ASSAY OF VITAMIN A: CPT

## 2025-07-24 PROCEDURE — 84443 ASSAY THYROID STIM HORMONE: CPT

## 2025-07-24 PROCEDURE — 84630 ASSAY OF ZINC: CPT

## 2025-07-24 PROCEDURE — 83036 HEMOGLOBIN GLYCOSYLATED A1C: CPT

## 2025-07-24 PROCEDURE — 80053 COMPREHEN METABOLIC PANEL: CPT

## 2025-07-24 PROCEDURE — 82306 VITAMIN D 25 HYDROXY: CPT

## 2025-07-24 PROCEDURE — 82746 ASSAY OF FOLIC ACID SERUM: CPT

## 2025-07-24 PROCEDURE — 85025 COMPLETE CBC W/AUTO DIFF WBC: CPT

## 2025-07-24 PROCEDURE — 82728 ASSAY OF FERRITIN: CPT

## 2025-07-24 PROCEDURE — 83540 ASSAY OF IRON: CPT

## 2025-07-26 LAB — ZINC SERPL-MCNC: 93 UG/DL (ref 44–115)

## 2025-07-27 LAB — VIT A SERPL-MCNC: 35.1 UG/DL (ref 18.9–57.3)

## 2025-07-28 ENCOUNTER — TELEPHONE (OUTPATIENT)
Age: 35
End: 2025-07-28

## 2025-07-29 LAB — VIT B1 BLD-SCNC: 127.6 NMOL/L (ref 66.5–200)

## 2025-07-30 ENCOUNTER — TELEPHONE (OUTPATIENT)
Dept: BARIATRICS | Facility: CLINIC | Age: 35
End: 2025-07-30

## 2025-07-31 ENCOUNTER — CLINICAL SUPPORT (OUTPATIENT)
Dept: BARIATRICS | Facility: CLINIC | Age: 35
End: 2025-07-31

## 2025-07-31 DIAGNOSIS — F43.23 ADJUSTMENT DISORDER WITH MIXED ANXIETY AND DEPRESSED MOOD: Primary | ICD-10-CM

## 2025-07-31 PROCEDURE — RECHECK: Performed by: SOCIAL WORKER

## 2025-08-07 ENCOUNTER — OFFICE VISIT (OUTPATIENT)
Dept: BARIATRICS | Facility: CLINIC | Age: 35
End: 2025-08-07
Payer: COMMERCIAL

## 2025-08-07 VITALS
RESPIRATION RATE: 16 BRPM | HEART RATE: 76 BPM | SYSTOLIC BLOOD PRESSURE: 122 MMHG | DIASTOLIC BLOOD PRESSURE: 78 MMHG | HEIGHT: 63 IN | WEIGHT: 182.6 LBS | BODY MASS INDEX: 32.36 KG/M2

## 2025-08-07 DIAGNOSIS — Z48.815 ENCOUNTER FOR SURGICAL AFTERCARE FOLLOWING SURGERY OF DIGESTIVE SYSTEM: Primary | ICD-10-CM

## 2025-08-07 PROBLEM — E11.9 TYPE 2 DIABETES MELLITUS WITHOUT COMPLICATION, WITHOUT LONG-TERM CURRENT USE OF INSULIN (HCC): Status: RESOLVED | Noted: 2022-05-12 | Resolved: 2025-08-07

## 2025-08-07 PROCEDURE — 99213 OFFICE O/P EST LOW 20 MIN: CPT | Performed by: SURGERY

## 2025-08-26 PROBLEM — K58.9 IBS (IRRITABLE BOWEL SYNDROME): Status: ACTIVE | Noted: 2025-08-26

## (undated) DEVICE — WEBRIL 6 IN UNSTERILE

## (undated) DEVICE — GLOVE SRG BIOGEL 7

## (undated) DEVICE — LIGHT GLOVE GREEN

## (undated) DEVICE — NEPTUNE E-SEP SMOKE EVACUATION PENCIL, COATED, 70MM BLADE, PUSH BUTTON SWITCH: Brand: NEPTUNE E-SEP

## (undated) DEVICE — GLOVE SRG BIOGEL ECLIPSE 7.5

## (undated) DEVICE — LIGHT HANDLE COVER SLEEVE DISP BLUE STELLAR

## (undated) DEVICE — TROCAR: Brand: KII FIOS FIRST ENTRY

## (undated) DEVICE — GLOVE SRG BIOGEL ECLIPSE 8

## (undated) DEVICE — HARMONIC 1100 SHEARS, 36CM SHAFT LENGTH: Brand: HARMONIC

## (undated) DEVICE — INTENDED FOR TISSUE SEPARATION, AND OTHER PROCEDURES THAT REQUIRE A SHARP SURGICAL BLADE TO PUNCTURE OR CUT.: Brand: BARD-PARKER SAFETY BLADES SIZE 11, STERILE

## (undated) DEVICE — X-RAY DETECTABLE SPONGES,16 PLY: Brand: VISTEC

## (undated) DEVICE — SUT PDS II 3-0 SH 27 IN Z316H

## (undated) DEVICE — GLOVE INDICATOR PI UNDERGLOVE SZ 7 BLUE

## (undated) DEVICE — TISSUE RETRIEVAL SYSTEM: Brand: INZII RETRIEVAL SYSTEM

## (undated) DEVICE — ASTOUND STANDARD SURGICAL GOWN, XL: Brand: CONVERTORS

## (undated) DEVICE — DRAPE SHEET THREE QUARTER

## (undated) DEVICE — SCD SEQUENTIAL COMPRESSION COMFORT SLEEVE MEDIUM KNEE LENGTH: Brand: KENDALL SCD

## (undated) DEVICE — SUT VICRYL 0 18 IN J906G

## (undated) DEVICE — KERLIX BANDAGE ROLL: Brand: KERLIX

## (undated) DEVICE — SUT ETHIBOND 2-0 SH/SH 36 IN X523H

## (undated) DEVICE — TRAVELKIT CONTAINS FIRST STEP KIT (200ML EP-4 KIT) AND SOILED SCOPE BAG - 1 KIT: Brand: TRAVELKIT CONTAINS FIRST STEP KIT AND SOILED SCOPE BAG

## (undated) DEVICE — ENDOPATH ECHELON ENDOSCOPIC LINEAR CUTTER RELOADS, WHITE, 60MM: Brand: ECHELON ENDOPATH

## (undated) DEVICE — 4-PORT MANIFOLD: Brand: NEPTUNE 2

## (undated) DEVICE — METZENBAUM ADTEC SINGLE USE DISSECTING SCISSORS, SHAFT ONLY, MONOPOLAR, CURVED TO LEFT, WORKING LENGTH: 12 1/4", (310 MM), DIAM. 5 MM, INSULATED, DOUBLE ACTION, STERILE, DISPOSABLE, PACKAGE OF 10 PIECES: Brand: AESCULAP

## (undated) DEVICE — ECHELON 3000 60MM STANDARD: Brand: ECHELON

## (undated) DEVICE — UTILITY MARKER,BLACK WITH LABELS: Brand: DEVON

## (undated) DEVICE — TOWEL SURG XR DETECT GREEN STRL RFD

## (undated) DEVICE — TUBING SUCTION 5MM X 12 FT

## (undated) DEVICE — ADHESIVE SKIN HIGH VISCOSITY EXOFIN 1ML

## (undated) DEVICE — SUT MONOCRYL 4-0 PS-2 27 IN Y426H

## (undated) DEVICE — KIT, GASTRIC BYPASS: Brand: CARDINAL HEALTH

## (undated) DEVICE — INSUFFLATION NEEDLE TO ESTABLISH PNEUMOPERITONEUM.: Brand: INSUFFLATION NEEDLE

## (undated) DEVICE — EXOFIN PRECISION PEN HIGH VISCOSITY TOPICAL SKIN ADHESIVE: Brand: EXOFIN PRECISION PEN, 1G

## (undated) DEVICE — 60 ML SYRINGE,REGULAR TIP: Brand: MONOJECT

## (undated) DEVICE — LAPAROSCOPIC TROCAR SLEEVE/SINGLE USE: Brand: KII® SLEEVE

## (undated) DEVICE — MAYO STAND COVER: Brand: CONVERTORS

## (undated) DEVICE — TRUE CONTENT TO BE POPULATED AS PART OF REBRANDING: Brand: ARGYLE

## (undated) DEVICE — ENDOPATH XCEL BLADELESS TROCARS WITH STABILITY SLEEVES: Brand: ENDOPATH XCEL

## (undated) DEVICE — TUBE SET SMOKE EVAC PNEUMOCLEAR HUMIDIFIED